# Patient Record
Sex: FEMALE | Race: WHITE | NOT HISPANIC OR LATINO | Employment: FULL TIME | URBAN - METROPOLITAN AREA
[De-identification: names, ages, dates, MRNs, and addresses within clinical notes are randomized per-mention and may not be internally consistent; named-entity substitution may affect disease eponyms.]

---

## 2017-03-07 ENCOUNTER — HOSPITAL ENCOUNTER (EMERGENCY)
Facility: MEDICAL CENTER | Age: 22
End: 2017-03-07
Payer: COMMERCIAL

## 2017-03-07 VITALS
RESPIRATION RATE: 16 BRPM | HEIGHT: 70 IN | HEART RATE: 92 BPM | WEIGHT: 164.46 LBS | SYSTOLIC BLOOD PRESSURE: 115 MMHG | BODY MASS INDEX: 23.55 KG/M2 | OXYGEN SATURATION: 100 % | DIASTOLIC BLOOD PRESSURE: 73 MMHG | TEMPERATURE: 99.2 F

## 2017-03-07 PROCEDURE — 302449 STATCHG TRIAGE ONLY (STATISTIC): Mod: EDC

## 2017-03-07 ASSESSMENT — PAIN SCALES - GENERAL: PAINLEVEL_OUTOF10: 6

## 2017-03-07 NOTE — ED NOTES
Pt comes in complaining of generalized abd pain and nausea since last night. Pt reporting recent appy and an illius approx 5 months ago. Pt stating dizziness. Pt stating she is approx 3 days late on her menstrual cycle.

## 2017-11-14 ENCOUNTER — HOSPITAL ENCOUNTER (EMERGENCY)
Facility: MEDICAL CENTER | Age: 22
End: 2017-11-14
Attending: EMERGENCY MEDICINE
Payer: COMMERCIAL

## 2017-11-14 VITALS
TEMPERATURE: 98.6 F | WEIGHT: 164.24 LBS | OXYGEN SATURATION: 98 % | BODY MASS INDEX: 23.57 KG/M2 | RESPIRATION RATE: 18 BRPM | SYSTOLIC BLOOD PRESSURE: 134 MMHG | HEART RATE: 92 BPM | DIASTOLIC BLOOD PRESSURE: 93 MMHG

## 2017-11-14 DIAGNOSIS — R11.0 NAUSEA: ICD-10-CM

## 2017-11-14 DIAGNOSIS — B34.9 VIRAL SYNDROME: ICD-10-CM

## 2017-11-14 LAB
ALBUMIN SERPL BCP-MCNC: 4.6 G/DL (ref 3.2–4.9)
ALBUMIN/GLOB SERPL: 1.5 G/DL
ALP SERPL-CCNC: 37 U/L (ref 30–99)
ALT SERPL-CCNC: 15 U/L (ref 2–50)
ANION GAP SERPL CALC-SCNC: 10 MMOL/L (ref 0–11.9)
APPEARANCE UR: CLEAR
AST SERPL-CCNC: 18 U/L (ref 12–45)
BASOPHILS # BLD AUTO: 0.5 % (ref 0–1.8)
BASOPHILS # BLD: 0.05 K/UL (ref 0–0.12)
BILIRUB SERPL-MCNC: 0.4 MG/DL (ref 0.1–1.5)
BILIRUB UR QL STRIP.AUTO: NEGATIVE
BUN SERPL-MCNC: 13 MG/DL (ref 8–22)
CALCIUM SERPL-MCNC: 9.3 MG/DL (ref 8.4–10.2)
CHLORIDE SERPL-SCNC: 105 MMOL/L (ref 96–112)
CO2 SERPL-SCNC: 22 MMOL/L (ref 20–33)
COLOR UR: YELLOW
CREAT SERPL-MCNC: 0.89 MG/DL (ref 0.5–1.4)
CULTURE IF INDICATED INDCX: NO UA CULTURE
EOSINOPHIL # BLD AUTO: 0.15 K/UL (ref 0–0.51)
EOSINOPHIL NFR BLD: 1.5 % (ref 0–6.9)
ERYTHROCYTE [DISTWIDTH] IN BLOOD BY AUTOMATED COUNT: 46 FL (ref 35.9–50)
GFR SERPL CREATININE-BSD FRML MDRD: >60 ML/MIN/1.73 M 2
GLOBULIN SER CALC-MCNC: 3.1 G/DL (ref 1.9–3.5)
GLUCOSE SERPL-MCNC: 85 MG/DL (ref 65–99)
GLUCOSE UR STRIP.AUTO-MCNC: NEGATIVE MG/DL
HCG SERPL QL: NEGATIVE
HCT VFR BLD AUTO: 42.7 % (ref 37–47)
HGB BLD-MCNC: 14.4 G/DL (ref 12–16)
IMM GRANULOCYTES # BLD AUTO: 0.03 K/UL (ref 0–0.11)
IMM GRANULOCYTES NFR BLD AUTO: 0.3 % (ref 0–0.9)
KETONES UR STRIP.AUTO-MCNC: NEGATIVE MG/DL
LEUKOCYTE ESTERASE UR QL STRIP.AUTO: NEGATIVE
LYMPHOCYTES # BLD AUTO: 2.3 K/UL (ref 1–4.8)
LYMPHOCYTES NFR BLD: 23 % (ref 22–41)
MCH RBC QN AUTO: 31.5 PG (ref 27–33)
MCHC RBC AUTO-ENTMCNC: 33.7 G/DL (ref 33.6–35)
MCV RBC AUTO: 93.4 FL (ref 81.4–97.8)
MICRO URNS: NORMAL
MONOCYTES # BLD AUTO: 0.78 K/UL (ref 0–0.85)
MONOCYTES NFR BLD AUTO: 7.8 % (ref 0–13.4)
NEUTROPHILS # BLD AUTO: 6.68 K/UL (ref 2–7.15)
NEUTROPHILS NFR BLD: 66.9 % (ref 44–72)
NITRITE UR QL STRIP.AUTO: NEGATIVE
NRBC # BLD AUTO: 0 K/UL
NRBC BLD AUTO-RTO: 0 /100 WBC
PH UR STRIP.AUTO: 6 [PH]
PLATELET # BLD AUTO: 209 K/UL (ref 164–446)
PMV BLD AUTO: 11.3 FL (ref 9–12.9)
POTASSIUM SERPL-SCNC: 3.8 MMOL/L (ref 3.6–5.5)
PROT SERPL-MCNC: 7.7 G/DL (ref 6–8.2)
PROT UR QL STRIP: NEGATIVE MG/DL
RBC # BLD AUTO: 4.57 M/UL (ref 4.2–5.4)
RBC UR QL AUTO: NEGATIVE
SODIUM SERPL-SCNC: 137 MMOL/L (ref 135–145)
SP GR UR STRIP.AUTO: 1.02
WBC # BLD AUTO: 10 K/UL (ref 4.8–10.8)

## 2017-11-14 PROCEDURE — 85025 COMPLETE CBC W/AUTO DIFF WBC: CPT

## 2017-11-14 PROCEDURE — 99283 EMERGENCY DEPT VISIT LOW MDM: CPT

## 2017-11-14 PROCEDURE — 84703 CHORIONIC GONADOTROPIN ASSAY: CPT

## 2017-11-14 PROCEDURE — 81003 URINALYSIS AUTO W/O SCOPE: CPT

## 2017-11-14 PROCEDURE — 80053 COMPREHEN METABOLIC PANEL: CPT

## 2017-11-14 RX ORDER — ONDANSETRON 4 MG/1
4 TABLET, FILM COATED ORAL EVERY 6 HOURS PRN
Qty: 10 EACH | Refills: 1 | Status: SHIPPED | OUTPATIENT
Start: 2017-11-14 | End: 2018-03-27

## 2017-11-14 NOTE — ED NOTES
"Two weeks of nausea and decreased appetite. Started after getting cold symptoms.   States has lost 25 lbs. States weighed 180 lbs - now 164 lbs in triage.  Having difficulty at work as a  - feels weak \"like I might go unconscious.     "

## 2017-11-14 NOTE — ED PROVIDER NOTES
ED Provider Note    CHIEF COMPLAINT  Chief Complaint   Patient presents with   • Dizziness   • Nausea   • Loss of Appetite       HPI  Gwendolyn Masters is a 22 y.o. female who presentsWith complaint of lightheadedness, nausea.  While at work last week, she felt lightheaded, working as a .  She states she was sent home.  With persistent malaise, she presents for evaluation.  Symptoms 1st started after developing rhinorrhea and cough he can go.  As the cough improved, she developed more nausea with poor appetite.  She believes herself to have lost 10-15 pounds over the past several months.  No true syncope.  No chest pain.  No shortness of breath.    REVIEW OF SYSTEMS  Constitutional: Gen. malaise, general weakness  Respiratory: No shortness of breath.  Cough last week has resolved  Cardiac: No chest pain or syncope  Gastrointestinal: Nausea, poor appetite  Musculoskeletal: No acute neck or back pain  Neurologic: No headache  Genitourinary: No dysuria       All other systems are negative.     PAST MEDICAL HISTORY  Past Medical History:   Diagnosis Date   • Arm fracture, right     x2       FAMILY HISTORY  History reviewed. No pertinent family history.    SOCIAL HISTORY  Social History     Social History   • Marital status: Single     Spouse name: N/A   • Number of children: N/A   • Years of education: N/A     Social History Main Topics   • Smoking status: Former Smoker     Packs/day: 0.15     Types: Cigarettes     Quit date: 5/14/2017   • Smokeless tobacco: Never Used   • Alcohol use No      Comment: rare   • Drug use:       Comment: Inhailed [employer]   • Sexual activity: Not on file     Other Topics Concern   • Not on file     Social History Narrative   • No narrative on file       SURGICAL HISTORY  Past Surgical History:   Procedure Laterality Date   • LAPAROSCOPIC LYSIS OF ADHESIONS N/A 9/4/2016    Procedure: LAPAROSCOPIC LYSIS OF ADHESIONS;  Surgeon: Venecia Hawk M.D.;  Location: SURGERY [facility];   "Service:    • APPENDECTOMY LAPAROSCOPIC  7/26/2016    Procedure: APPENDECTOMY LAPAROSCOPIC;  Surgeon: Joao Dorantes M.D.;  Location: SURGERY [facility];  Service:    • OTHER ORTHOPEDIC SURGERY      meniscus repair - RIGHT       CURRENT MEDICATIONS  Home Medications     Reviewed by Low Islas (Pharmacy Tech) on 11/14/17 at 0935  Med List Status: Complete   Medication Last Dose Status        Patient Bora Taking any Medications                       ALLERGIES  Allergies   Allergen Reactions   • Erythromycin Unspecified     \"Childhood allergie\".     • Morphine Hives       PHYSICAL EXAM  VITAL SIGNS: /93   Pulse 92   Temp 37 °C (98.6 °F)   Resp 18   Wt 74.5 kg (164 lb 3.9 oz)   LMP 10/20/2017 (Approximate)   SpO2 98%   BMI 23.57 kg/m²   Constitutional:Well-nourished, nontoxic appearance  ENT: Nares clear, mucous membranes moist.  Eyes:  Conjunctiva normal, No discharge.    Lymphatic: No adenopathy.   Cardiovascular: Normal heart rate, Normal rhythm.   Pulmonary: No wheezing, no rales  Gastrointestinal: Abdomen is soft and nontender.  No guarding.  Bowel sounds are active.  Skin: Warm, Dry, No erythema, No rash.   Musculoskeletal:  No CVA tenderness.   Neurologic: Alert & oriented x 3, Normal motor and sensory function, No focal deficits noted.   Psychiatric:Normal affect, Normal mood.    RADIOLOGY/PROCEDURES/Labs  Results for orders placed or performed during the hospital encounter of 11/14/17   URINALYSIS,CULTURE IF INDICATED   Result Value Ref Range    Color Yellow     Character Clear     Specific Gravity 1.025 <1.035    Ph 6.0 5.0 - 8.0    Glucose Negative Negative mg/dL    Ketones Negative Negative mg/dL    Protein Negative Negative mg/dL    Bilirubin Negative Negative    Nitrite Negative Negative    Leukocyte Esterase Negative Negative    Occult Blood Negative Negative    Micro Urine Req see below     Culture Indicated No UA Culture   CBC WITH DIFFERENTIAL   Result Value Ref Range    " WBC 10.0 4.8 - 10.8 K/uL    RBC 4.57 4.20 - 5.40 M/uL    Hemoglobin 14.4 12.0 - 16.0 g/dL    Hematocrit 42.7 37.0 - 47.0 %    MCV 93.4 81.4 - 97.8 fL    MCH 31.5 27.0 - 33.0 pg    MCHC 33.7 33.6 - 35.0 g/dL    RDW 46.0 35.9 - 50.0 fL    Platelet Count 209 164 - 446 K/uL    MPV 11.3 9.0 - 12.9 fL    Neutrophils-Polys 66.90 44.00 - 72.00 %    Lymphocytes 23.00 22.00 - 41.00 %    Monocytes 7.80 0.00 - 13.40 %    Eosinophils 1.50 0.00 - 6.90 %    Basophils 0.50 0.00 - 1.80 %    Immature Granulocytes 0.30 0.00 - 0.90 %    Nucleated RBC 0.00 /100 WBC    Neutrophils (Absolute) 6.68 2.00 - 7.15 K/uL    Lymphs (Absolute) 2.30 1.00 - 4.80 K/uL    Monos (Absolute) 0.78 0.00 - 0.85 K/uL    Eos (Absolute) 0.15 0.00 - 0.51 K/uL    Baso (Absolute) 0.05 0.00 - 0.12 K/uL    Immature Granulocytes (abs) 0.03 0.00 - 0.11 K/uL    NRBC (Absolute) 0.00 K/uL   COMP METABOLIC PANEL   Result Value Ref Range    Sodium 137 135 - 145 mmol/L    Potassium 3.8 3.6 - 5.5 mmol/L    Chloride 105 96 - 112 mmol/L    Co2 22 20 - 33 mmol/L    Anion Gap 10.0 0.0 - 11.9    Glucose 85 65 - 99 mg/dL    Bun 13 8 - 22 mg/dL    Creatinine 0.89 0.50 - 1.40 mg/dL    Calcium 9.3 8.4 - 10.2 mg/dL    AST(SGOT) 18 12 - 45 U/L    ALT(SGPT) 15 2 - 50 U/L    Alkaline Phosphatase 37 30 - 99 U/L    Total Bilirubin 0.4 0.1 - 1.5 mg/dL    Albumin 4.6 3.2 - 4.9 g/dL    Total Protein 7.7 6.0 - 8.2 g/dL    Globulin 3.1 1.9 - 3.5 g/dL    A-G Ratio 1.5 g/dL   HCG QUAL SERUM   Result Value Ref Range    Beta-Hcg Qualitative Serum Negative Negative   ESTIMATED GFR   Result Value Ref Range    GFR If African American >60 >60 mL/min/1.73 m 2    GFR If Non African American >60 >60 mL/min/1.73 m 2         COURSE & MEDICAL DECISION MAKING  Pertinent Labs & Imaging studies reviewed. (See chart for details)  Suspect viral syndrome, patient has nausea and poor appetite following cough and cold symptoms.  Laboratory evaluation is unremarkable, normal kidney function, normal glucose.  She is  not pregnant nor is she anemic.  Her urinalysis was negative.  Patient is prescribed Zofran for nausea, advised follow-up with  if not better in one week.  Patient has requested and was provided a note clearing her to return to work.    FINAL IMPRESSION  1. Viral syndrome    2. Nausea            Electronically signed by: Mike Dwyer, 11/14/2017 2:39 PM

## 2017-11-14 NOTE — ED NOTES
"Med rec updated and complete  Allergies reviewed  Pt states \"No prescription medications, OTC's, or vitamins\".  Pt states \"No antibiotics in the last 30 days\".    "

## 2017-11-14 NOTE — DISCHARGE INSTRUCTIONS
Nausea, Adult  Nausea is the feeling that you have an upset stomach or have to vomit. Nausea by itself is not likely a serious concern, but it may be an early sign of more serious medical problems. As nausea gets worse, it can lead to vomiting. If vomiting develops, there is the risk of dehydration.   CAUSES   · Viral infections.  · Food poisoning.  · Medicines.  · Pregnancy.  · Motion sickness.  · Migraine headaches.  · Emotional distress.  · Severe pain from any source.  · Alcohol intoxication.  HOME CARE INSTRUCTIONS  · Get plenty of rest.  · Ask your caregiver about specific rehydration instructions.  · Eat small amounts of food and sip liquids more often.  · Take all medicines as told by your caregiver.  SEEK MEDICAL CARE IF:  · You have not improved after 2 days, or you get worse.  · You have a headache.  SEEK IMMEDIATE MEDICAL CARE IF:   · You have a fever.  · You faint.  · You keep vomiting or have blood in your vomit.  · You are extremely weak or dehydrated.  · You have dark or bloody stools.  · You have severe chest or abdominal pain.  MAKE SURE YOU:  · Understand these instructions.  · Will watch your condition.  · Will get help right away if you are not doing well or get worse.     This information is not intended to replace advice given to you by your health care provider. Make sure you discuss any questions you have with your health care provider.     Document Released: 01/25/2006 Document Revised: 01/08/2016 Document Reviewed: 08/29/2012  [employer] Interactive Patient Education ©2016 [employer]

## 2017-12-26 ENCOUNTER — HOSPITAL ENCOUNTER (EMERGENCY)
Facility: MEDICAL CENTER | Age: 22
End: 2017-12-26
Attending: EMERGENCY MEDICINE
Payer: COMMERCIAL

## 2017-12-26 VITALS
OXYGEN SATURATION: 97 % | TEMPERATURE: 98.8 F | RESPIRATION RATE: 16 BRPM | SYSTOLIC BLOOD PRESSURE: 104 MMHG | BODY MASS INDEX: 23.13 KG/M2 | HEIGHT: 70 IN | WEIGHT: 161.6 LBS | HEART RATE: 91 BPM | DIASTOLIC BLOOD PRESSURE: 62 MMHG

## 2017-12-26 DIAGNOSIS — R11.0 NAUSEA: ICD-10-CM

## 2017-12-26 DIAGNOSIS — N39.0 URINARY TRACT INFECTION WITHOUT HEMATURIA, SITE UNSPECIFIED: ICD-10-CM

## 2017-12-26 LAB
ALBUMIN SERPL BCP-MCNC: 4.8 G/DL (ref 3.2–4.9)
ALBUMIN/GLOB SERPL: 1.7 G/DL
ALP SERPL-CCNC: 52 U/L (ref 30–99)
ALT SERPL-CCNC: 12 U/L (ref 2–50)
ANION GAP SERPL CALC-SCNC: 11 MMOL/L (ref 0–11.9)
APPEARANCE UR: ABNORMAL
AST SERPL-CCNC: 16 U/L (ref 12–45)
BACTERIA #/AREA URNS HPF: ABNORMAL /HPF
BACTERIA GENITAL QL WET PREP: NORMAL
BASOPHILS # BLD AUTO: 0.5 % (ref 0–1.8)
BASOPHILS # BLD: 0.06 K/UL (ref 0–0.12)
BILIRUB SERPL-MCNC: 1.1 MG/DL (ref 0.1–1.5)
BILIRUB UR QL STRIP.AUTO: NEGATIVE
BUN SERPL-MCNC: 16 MG/DL (ref 8–22)
CALCIUM SERPL-MCNC: 9.3 MG/DL (ref 8.4–10.2)
CHLORIDE SERPL-SCNC: 102 MMOL/L (ref 96–112)
CO2 SERPL-SCNC: 22 MMOL/L (ref 20–33)
COLOR UR: YELLOW
CREAT SERPL-MCNC: 0.8 MG/DL (ref 0.5–1.4)
CULTURE IF INDICATED INDCX: YES UA CULTURE
EOSINOPHIL # BLD AUTO: 0.14 K/UL (ref 0–0.51)
EOSINOPHIL NFR BLD: 1.1 % (ref 0–6.9)
EPI CELLS #/AREA URNS HPF: ABNORMAL /HPF
ERYTHROCYTE [DISTWIDTH] IN BLOOD BY AUTOMATED COUNT: 44.8 FL (ref 35.9–50)
GFR SERPL CREATININE-BSD FRML MDRD: >60 ML/MIN/1.73 M 2
GLOBULIN SER CALC-MCNC: 2.9 G/DL (ref 1.9–3.5)
GLUCOSE SERPL-MCNC: 108 MG/DL (ref 65–99)
GLUCOSE UR STRIP.AUTO-MCNC: NEGATIVE MG/DL
HCG UR QL: NEGATIVE
HCT VFR BLD AUTO: 41.9 % (ref 37–47)
HGB BLD-MCNC: 14.3 G/DL (ref 12–16)
IMM GRANULOCYTES # BLD AUTO: 0.04 K/UL (ref 0–0.11)
IMM GRANULOCYTES NFR BLD AUTO: 0.3 % (ref 0–0.9)
KETONES UR STRIP.AUTO-MCNC: 15 MG/DL
LEUKOCYTE ESTERASE UR QL STRIP.AUTO: NEGATIVE
LIPASE SERPL-CCNC: 26 U/L (ref 7–58)
LYMPHOCYTES # BLD AUTO: 1.24 K/UL (ref 1–4.8)
LYMPHOCYTES NFR BLD: 9.6 % (ref 22–41)
MCH RBC QN AUTO: 31.4 PG (ref 27–33)
MCHC RBC AUTO-ENTMCNC: 34.1 G/DL (ref 33.6–35)
MCV RBC AUTO: 91.9 FL (ref 81.4–97.8)
MICRO URNS: ABNORMAL
MONOCYTES # BLD AUTO: 0.86 K/UL (ref 0–0.85)
MONOCYTES NFR BLD AUTO: 6.7 % (ref 0–13.4)
MUCOUS THREADS #/AREA URNS HPF: ABNORMAL /HPF
NEUTROPHILS # BLD AUTO: 10.51 K/UL (ref 2–7.15)
NEUTROPHILS NFR BLD: 81.8 % (ref 44–72)
NITRITE UR QL STRIP.AUTO: POSITIVE
NRBC # BLD AUTO: 0 K/UL
NRBC BLD-RTO: 0 /100 WBC
PH UR STRIP.AUTO: 7 [PH]
PLATELET # BLD AUTO: 282 K/UL (ref 164–446)
PMV BLD AUTO: 10.9 FL (ref 9–12.9)
POTASSIUM SERPL-SCNC: 3.6 MMOL/L (ref 3.6–5.5)
PROT SERPL-MCNC: 7.7 G/DL (ref 6–8.2)
PROT UR QL STRIP: NEGATIVE MG/DL
RBC # BLD AUTO: 4.56 M/UL (ref 4.2–5.4)
RBC # URNS HPF: ABNORMAL /HPF
RBC UR QL AUTO: NEGATIVE
SIGNIFICANT IND 70042: NORMAL
SITE SITE: NORMAL
SODIUM SERPL-SCNC: 135 MMOL/L (ref 135–145)
SOURCE SOURCE: NORMAL
SP GR UR REFRACTOMETRY: 1.02
SP GR UR STRIP.AUTO: 1.02
WBC # BLD AUTO: 12.9 K/UL (ref 4.8–10.8)
WBC #/AREA URNS HPF: ABNORMAL /HPF

## 2017-12-26 PROCEDURE — 83690 ASSAY OF LIPASE: CPT

## 2017-12-26 PROCEDURE — 87077 CULTURE AEROBIC IDENTIFY: CPT

## 2017-12-26 PROCEDURE — 87591 N.GONORRHOEAE DNA AMP PROB: CPT

## 2017-12-26 PROCEDURE — 700111 HCHG RX REV CODE 636 W/ 250 OVERRIDE (IP): Performed by: EMERGENCY MEDICINE

## 2017-12-26 PROCEDURE — A9270 NON-COVERED ITEM OR SERVICE: HCPCS | Performed by: EMERGENCY MEDICINE

## 2017-12-26 PROCEDURE — 96375 TX/PRO/DX INJ NEW DRUG ADDON: CPT

## 2017-12-26 PROCEDURE — 96374 THER/PROPH/DIAG INJ IV PUSH: CPT

## 2017-12-26 PROCEDURE — 700105 HCHG RX REV CODE 258: Performed by: EMERGENCY MEDICINE

## 2017-12-26 PROCEDURE — 36415 COLL VENOUS BLD VENIPUNCTURE: CPT

## 2017-12-26 PROCEDURE — 80053 COMPREHEN METABOLIC PANEL: CPT

## 2017-12-26 PROCEDURE — 87186 SC STD MICRODIL/AGAR DIL: CPT

## 2017-12-26 PROCEDURE — 81001 URINALYSIS AUTO W/SCOPE: CPT

## 2017-12-26 PROCEDURE — 700102 HCHG RX REV CODE 250 W/ 637 OVERRIDE(OP): Performed by: EMERGENCY MEDICINE

## 2017-12-26 PROCEDURE — 85025 COMPLETE CBC W/AUTO DIFF WBC: CPT

## 2017-12-26 PROCEDURE — 87491 CHLMYD TRACH DNA AMP PROBE: CPT

## 2017-12-26 PROCEDURE — 81025 URINE PREGNANCY TEST: CPT

## 2017-12-26 PROCEDURE — 87086 URINE CULTURE/COLONY COUNT: CPT

## 2017-12-26 PROCEDURE — 99285 EMERGENCY DEPT VISIT HI MDM: CPT

## 2017-12-26 RX ORDER — ONDANSETRON 4 MG/1
4 TABLET, ORALLY DISINTEGRATING ORAL EVERY 6 HOURS PRN
Qty: 20 TAB | Refills: 0 | Status: SHIPPED | OUTPATIENT
Start: 2017-12-26 | End: 2018-03-27

## 2017-12-26 RX ORDER — KETOROLAC TROMETHAMINE 30 MG/ML
30 INJECTION, SOLUTION INTRAMUSCULAR; INTRAVENOUS ONCE
Status: COMPLETED | OUTPATIENT
Start: 2017-12-26 | End: 2017-12-26

## 2017-12-26 RX ORDER — CEFDINIR 300 MG/1
300 CAPSULE ORAL ONCE
Status: COMPLETED | OUTPATIENT
Start: 2017-12-26 | End: 2017-12-26

## 2017-12-26 RX ORDER — SODIUM CHLORIDE 9 MG/ML
1000 INJECTION, SOLUTION INTRAVENOUS ONCE
Status: COMPLETED | OUTPATIENT
Start: 2017-12-26 | End: 2017-12-26

## 2017-12-26 RX ORDER — CEFDINIR 300 MG/1
300 CAPSULE ORAL 2 TIMES DAILY
Qty: 20 CAP | Refills: 0 | Status: SHIPPED | OUTPATIENT
Start: 2017-12-26 | End: 2018-01-05

## 2017-12-26 RX ORDER — ONDANSETRON 2 MG/ML
4 INJECTION INTRAMUSCULAR; INTRAVENOUS ONCE
Status: COMPLETED | OUTPATIENT
Start: 2017-12-26 | End: 2017-12-26

## 2017-12-26 RX ORDER — ACETAMINOPHEN 325 MG/1
650 TABLET ORAL ONCE
Status: COMPLETED | OUTPATIENT
Start: 2017-12-26 | End: 2017-12-26

## 2017-12-26 RX ADMIN — ACETAMINOPHEN 650 MG: 325 TABLET, FILM COATED ORAL at 03:51

## 2017-12-26 RX ADMIN — KETOROLAC TROMETHAMINE 30 MG: 30 INJECTION, SOLUTION INTRAMUSCULAR at 04:17

## 2017-12-26 RX ADMIN — ONDANSETRON 4 MG: 2 INJECTION INTRAMUSCULAR; INTRAVENOUS at 03:45

## 2017-12-26 RX ADMIN — SODIUM CHLORIDE 1000 ML: 9 INJECTION, SOLUTION INTRAVENOUS at 03:44

## 2017-12-26 RX ADMIN — CEFDINIR 300 MG: 300 CAPSULE ORAL at 04:30

## 2017-12-26 RX ADMIN — LIDOCAINE HYDROCHLORIDE 30 ML: 20 SOLUTION OROPHARYNGEAL at 03:49

## 2017-12-26 ASSESSMENT — ENCOUNTER SYMPTOMS
CHILLS: 0
DIZZINESS: 0
HEARTBURN: 0
COUGH: 0
SHORTNESS OF BREATH: 0
FEVER: 0
ABDOMINAL PAIN: 1
VOMITING: 0
NAUSEA: 1

## 2017-12-26 NOTE — ED NOTES
Pt states feeling better took po antibiotic w/o difficulty.  Given d/c papers and instructed on f/u, pt verbalizes understanding.  Pt given work excuse at her request.

## 2017-12-26 NOTE — ED NOTES
"Pt comes in c/o severe nausea w/out vomiting  Also having severe abdomen pain   \"I've never had this severe nausea before\"  "

## 2017-12-26 NOTE — DISCHARGE INSTRUCTIONS
Urinary Tract Infection  Urinary tract infections (UTIs) can develop anywhere along your urinary tract. Your urinary tract is your body's drainage system for removing wastes and extra water. Your urinary tract includes two kidneys, two ureters, a bladder, and a urethra. Your kidneys are a pair of bean-shaped organs. Each kidney is about the size of your fist. They are located below your ribs, one on each side of your spine.  CAUSES  Infections are caused by microbes, which are microscopic organisms, including fungi, viruses, and bacteria. These organisms are so small that they can only be seen through a microscope. Bacteria are the microbes that most commonly cause UTIs.  SYMPTOMS   Symptoms of UTIs may vary by age and gender of the patient and by the location of the infection. Symptoms in young women typically include a frequent and intense urge to urinate and a painful, burning feeling in the bladder or urethra during urination. Older women and men are more likely to be tired, shaky, and weak and have muscle aches and abdominal pain. A fever may mean the infection is in your kidneys. Other symptoms of a kidney infection include pain in your back or sides below the ribs, nausea, and vomiting.  DIAGNOSIS  To diagnose a UTI, your caregiver will ask you about your symptoms. Your caregiver also will ask to provide a urine sample. The urine sample will be tested for bacteria and white blood cells. White blood cells are made by your body to help fight infection.  TREATMENT   Typically, UTIs can be treated with medication. Because most UTIs are caused by a bacterial infection, they usually can be treated with the use of antibiotics. The choice of antibiotic and length of treatment depend on your symptoms and the type of bacteria causing your infection.  HOME CARE INSTRUCTIONS  · If you were prescribed antibiotics, take them exactly as your caregiver instructs you. Finish the medication even if you feel better after you  have only taken some of the medication.  · Drink enough water and fluids to keep your urine clear or pale yellow.  · Avoid caffeine, tea, and carbonated beverages. They tend to irritate your bladder.  · Empty your bladder often. Avoid holding urine for long periods of time.  · Empty your bladder before and after sexual intercourse.  · After a bowel movement, women should cleanse from front to back. Use each tissue only once.  SEEK MEDICAL CARE IF:   · You have back pain.  · You develop a fever.  · Your symptoms do not begin to resolve within 3 days.  SEEK IMMEDIATE MEDICAL CARE IF:   · You have severe back pain or lower abdominal pain.  · You develop chills.  · You have nausea or vomiting.  · You have continued burning or discomfort with urination.  MAKE SURE YOU:   · Understand these instructions.  · Will watch your condition.  · Will get help right away if you are not doing well or get worse.     This information is not intended to replace advice given to you by your health care provider. Make sure you discuss any questions you have with your health care provider.     Document Released: 09/27/2006 Document Revised: 01/08/2016 Document Reviewed: 01/25/2013  [employer] Interactive Patient Education ©2016 [employer]

## 2017-12-26 NOTE — ED NOTES
Pt given IV and PO med's IV NS up and infusing well  Pelvic exam done and specimens sent to lab  Comfort measures given

## 2017-12-26 NOTE — ED PROVIDER NOTES
"ED Provider Note    Means of arrival: private vehicle  History obtained from: patient  History limited by: none    CHIEF COMPLAINT  Chief Complaint   Patient presents with   • Nausea   • Abdominal Pain       HPI  Gwendolyn Masters is a 22 y.o. female who presents to the Emergency Department for abdominal pain. Patient reports that she has had epigastric pain on and off for the last week. The pain is aching and moderate in severity. She notes that the pain is worsened when she has intercourse.  She reports associated nausea. She denies fevers, chills, emesis, chest pain, SOB, vaginal bleeding and discharge. LMP was 11/11/17.    REVIEW OF SYSTEMS  Review of Systems   Constitutional: Negative for chills and fever.   Respiratory: Negative for cough and shortness of breath.    Cardiovascular: Negative for chest pain.   Gastrointestinal: Positive for abdominal pain and nausea. Negative for heartburn and vomiting.   Genitourinary:        Negative for vaginal bleeding and discharge.    Neurological: Negative for dizziness.   All other systems reviewed and are negative.    PAST MEDICAL HISTORY   has a past medical history of Arm fracture, right.    SURGICAL HISTORY   has a past surgical history that includes appendectomy laparoscopic (7/26/2016); laparoscopic lysis of adhesions (N/A, 9/4/2016); and other orthopedic surgery.    SOCIAL HISTORY  Social History   Substance Use Topics   • Smoking status: Former Smoker     Packs/day: 0.15     Types: Cigarettes     Quit date: 5/14/2017   • Smokeless tobacco: Never Used   • Alcohol use No      Comment: rare      History   Drug Use     Comment: Inhailed [employer]       FAMILY HISTORY  No pertinent family history    CURRENT MEDICATIONS  Home Medications    None         ALLERGIES  Allergies   Allergen Reactions   • Erythromycin Unspecified     \"Childhood allergie\".     • Morphine Hives       PHYSICAL EXAM  VITAL SIGNS: /62   Pulse 91   Temp 37.1 °C (98.8 °F)   Resp 16   Ht 1.778 m (5' " "10\")   Wt 73.3 kg (161 lb 9.6 oz)   LMP 12/11/2017   SpO2 97%   BMI 23.19 kg/m²   Vitals reviewed by myself.  Physical Exam   Constitutional: She is well-developed, well-nourished, and in no distress. No distress.   HENT:   Head: Normocephalic.   Eyes: EOM are normal.   Neck: Normal range of motion.   Cardiovascular: Normal rate, regular rhythm and normal heart sounds.  Exam reveals no gallop and no friction rub.    No murmur heard.  Pulmonary/Chest: Effort normal and breath sounds normal. No respiratory distress. She has no wheezes. She has no rales.   Abdominal: Soft. She exhibits no distension. There is no tenderness. There is no rebound and no guarding.   Genitourinary:   Genitourinary Comments: Normal external genitalia. Cervix is pink and non-friable without discharge or bleeding from the os.         DIAGNOSTIC STUDIES /  LABS  Labs Reviewed   CBC WITH DIFFERENTIAL - Abnormal; Notable for the following:        Result Value    WBC 12.9 (*)     Neutrophils-Polys 81.80 (*)     Lymphocytes 9.60 (*)     Neutrophils (Absolute) 10.51 (*)     Monos (Absolute) 0.86 (*)     All other components within normal limits   COMP METABOLIC PANEL - Abnormal; Notable for the following:     Glucose 108 (*)     All other components within normal limits   URINALYSIS,CULTURE IF INDICATED - Abnormal; Notable for the following:     Character Sl Cloudy (*)     Ketones 15 (*)     Nitrite Positive (*)     All other components within normal limits   URINE MICROSCOPIC (W/UA) - Abnormal; Notable for the following:     Bacteria Few (*)     All other components within normal limits   LIPASE   HCG QUALITATIVE UR   WET PREP   CHLAMYDIA/GC PCR URINE OR SWAB   REFRACTOMETER SG   ESTIMATED GFR   URINE CULTURE(NEW)      All labs reviewed by me      COURSE & MEDICAL DECISION MAKING  Nursing notes, VS, PMSFHx reviewed in chart.    Patient is a 22 year old female who comes in for epigastric pain worsened by intercourse. Differential diagnosis " includes BV, UTI, gastritis, gastroenteritis, pancreatitis and electrolyte abnormality. Diagnostic work-up includes labs, pelvic labs and UA.    Patient's initial vitals are within normal limits. She is treated with tylenol, zofran and GI cocktail after which she feels improved. Labs returns an are unremarkable. Pregnancy test is negative. UA is positive for UTI. Patient is treated with cefdinir and is able to tolerate oral intake in the ED. Therefore she is reassured, provided with prescriptions for cefdinir and zofran. She is then given strict return precautions and discharged home in stable condition.      FINAL IMPRESSION  1. Urinary tract infection without hematuria, site unspecified    2. Nausea

## 2017-12-26 NOTE — LETTER
12/29/2017               Gwendolyn Masters  [address] [facility] [address]        Dear Gwendolyn (MR#[id number])    This letter is sent in regards to your, recent visit to the [facility] Emergency Department on 12/26/2017.  During the visit, tests were performed to assist the physician in a medical diagnosis.  A review of those tests requires that we notify you of the following:    Your urine culture was POSITIVE for a bacteria called Escherichia coli. The antibiotic prescribed for you (cefdinir) should be active to treat this bacteria. IT IS IMPORTANT THAT YOU CONTINUE TAKING YOUR ANTIBIOTIC UNTIL IT IS FINISHED.      Please feel free to contact me at the number below if you have any questions or concerns. Thank you for your cooperation in the matter.    Sincerely,  ED Culture Follow-Up Staff  Daniela Ovalle, PharmD    [facility], Emergency Department  [address] [place] [id number]  [phone] (ED Culture Line)  [phone]

## 2017-12-28 LAB
BACTERIA UR CULT: ABNORMAL
BACTERIA UR CULT: ABNORMAL
C TRACH DNA SPEC QL NAA+PROBE: NEGATIVE
N GONORRHOEA DNA SPEC QL NAA+PROBE: NEGATIVE
SIGNIFICANT IND 70042: ABNORMAL
SITE SITE: ABNORMAL
SOURCE SOURCE: ABNORMAL
SPECIMEN SOURCE: NORMAL

## 2017-12-30 NOTE — ED NOTES
ED Positive Culture Follow-up/Notification Note:    Date: 12/29/17     Patient seen in the ED on 12/26/2017 for abdominal pain worsened with intercourse.   1. Urinary tract infection without hematuria, site unspecified    2. Nausea       Discharge Medication List as of 12/26/2017  4:38 AM      START taking these medications    Details   cefdinir (OMNICEF) 300 MG Cap Take 1 Cap by mouth 2 times a day for 10 days., Disp-20 Cap, R-0, Print Rx Paper      ondansetron (ZOFRAN ODT) 4 MG TABLET DISPERSIBLE Take 1 Tab by mouth every 6 hours as needed for Nausea., Disp-20 Tab, R-0, Print Rx Paper             Allergies: Erythromycin and Morphine     Final cultures:   Results     Procedure Component Value Units Date/Time    CHLAMYDIA & GC BY PCR [[id number]] Collected:  12/26/17 0355    Order Status:  Completed Specimen:  Genital from Genital Updated:  12/28/17 2019     Source Vaginal     C. trachomatis by PCR Negative     N. gonorrhoeae by PCR Negative    URINE CULTURE(NEW) [[id number]]  (Abnormal)  (Susceptibility) Collected:  12/26/17 0335    Order Status:  Completed Specimen:  Urine Updated:  12/28/17 0859     Significant Indicator POS (POS)     Source UR     Site --     Urine Culture -- (A)     Urine Culture -- (A)     Escherichia coli  >100,000 cfu/mL      Narrative:       TEST Urine Culture WAS CANCELLED, 12/26/17 06:48 HIS# [id number]    Culture & Susceptibility     ESCHERICHIA COLI     Antibiotic Sensitivity Microscan Unit Status    Ampicillin Sensitive <=8 mcg/mL Final    Cefepime Sensitive <=8 mcg/mL Final    Cefotaxime Sensitive <=2 mcg/mL Final    Cefotetan Sensitive <=16 mcg/mL Final    Ceftazidime Sensitive <=1 mcg/mL Final    Ceftriaxone Sensitive <=8 mcg/mL Final    Cefuroxime Sensitive <=4 mcg/mL Final    Cephalothin Sensitive <=8 mcg/mL Final    Ciprofloxacin Sensitive <=1 mcg/mL Final    Gentamicin Sensitive <=4 mcg/mL Final    Levofloxacin Sensitive <=2 mcg/mL Final    Nitrofurantoin Sensitive <=32 mcg/mL  Final    Pip/Tazobactam Sensitive <=16 mcg/mL Final    Piperacillin Sensitive <=16 mcg/mL Final    Tigecycline Sensitive <=2 mcg/mL Final    Tobramycin Sensitive <=4 mcg/mL Final    Trimeth/Sulfa Sensitive <=2/38 mcg/mL Final                       WET PREP [[id number]] Collected:  12/26/17 0355    Order Status:  Completed Specimen:  Genital from Vaginal Updated:  12/26/17 0407     Significant Indicator NEG     Source GEN     Site VAGINAL     Wet Prep For Parasites --     No yeast.  No motile Trichomonas seen.  No clue cells seen.  Few WBC's seen.      URINALYSIS,CULTURE IF INDICATED [[id number]]  (Abnormal) Collected:  12/26/17 0335    Order Status:  Completed Specimen:  Urine from Urine, Clean Catch Updated:  12/26/17 0357     Color Yellow     Character Sl Cloudy (A)     Comment: Corrected result; previously reported as Clear on 12/26/17 at 03:50        Specific Gravity 1.020     Ph 7.0     Glucose Negative mg/dL      Ketones 15 (A) mg/dL      Protein Negative mg/dL      Bilirubin Negative     Nitrite Positive (A)     Leukocyte Esterase Negative     Occult Blood Negative     Micro Urine Req Microscopic     Culture Indicated Yes UA Culture     URINE CULTURE(NEW) [[id number]]     Order Status:  Canceled           Plan:   Appropriate antibiotic therapy prescribed. No changes required based upon culture result.  Sent letter to patient to notify of positive culture result and encourage compliance with prescribed antibiotics.     Daniela Ovalle

## 2018-03-27 ENCOUNTER — APPOINTMENT (OUTPATIENT)
Dept: RADIOLOGY | Facility: MEDICAL CENTER | Age: 23
End: 2018-03-27
Attending: EMERGENCY MEDICINE
Payer: COMMERCIAL

## 2018-03-27 ENCOUNTER — HOSPITAL ENCOUNTER (EMERGENCY)
Facility: MEDICAL CENTER | Age: 23
End: 2018-03-27
Attending: EMERGENCY MEDICINE
Payer: COMMERCIAL

## 2018-03-27 VITALS
DIASTOLIC BLOOD PRESSURE: 74 MMHG | SYSTOLIC BLOOD PRESSURE: 112 MMHG | TEMPERATURE: 98 F | BODY MASS INDEX: 22.81 KG/M2 | RESPIRATION RATE: 17 BRPM | HEART RATE: 79 BPM | WEIGHT: 158.95 LBS | OXYGEN SATURATION: 100 %

## 2018-03-27 DIAGNOSIS — N28.1 RENAL CYST: ICD-10-CM

## 2018-03-27 DIAGNOSIS — R10.84 GENERALIZED ABDOMINAL PAIN: ICD-10-CM

## 2018-03-27 LAB
ALBUMIN SERPL BCP-MCNC: 4.4 G/DL (ref 3.2–4.9)
ALBUMIN/GLOB SERPL: 1.5 G/DL
ALP SERPL-CCNC: 37 U/L (ref 30–99)
ALT SERPL-CCNC: 13 U/L (ref 2–50)
ANION GAP SERPL CALC-SCNC: 5 MMOL/L (ref 0–11.9)
APPEARANCE UR: CLEAR
AST SERPL-CCNC: 16 U/L (ref 12–45)
BASOPHILS # BLD AUTO: 1 % (ref 0–1.8)
BASOPHILS # BLD: 0.06 K/UL (ref 0–0.12)
BILIRUB SERPL-MCNC: 0.4 MG/DL (ref 0.1–1.5)
BUN SERPL-MCNC: 12 MG/DL (ref 8–22)
CALCIUM SERPL-MCNC: 9.2 MG/DL (ref 8.4–10.2)
CHLORIDE SERPL-SCNC: 106 MMOL/L (ref 96–112)
CO2 SERPL-SCNC: 24 MMOL/L (ref 20–33)
COLOR UR: YELLOW
CREAT SERPL-MCNC: 0.92 MG/DL (ref 0.5–1.4)
EOSINOPHIL # BLD AUTO: 0.14 K/UL (ref 0–0.51)
EOSINOPHIL NFR BLD: 2.3 % (ref 0–6.9)
ERYTHROCYTE [DISTWIDTH] IN BLOOD BY AUTOMATED COUNT: 48.1 FL (ref 35.9–50)
GLOBULIN SER CALC-MCNC: 2.9 G/DL (ref 1.9–3.5)
GLUCOSE SERPL-MCNC: 77 MG/DL (ref 65–99)
GLUCOSE UR STRIP.AUTO-MCNC: NEGATIVE MG/DL
HCG SERPL QL: NEGATIVE
HCG UR QL: NEGATIVE
HCT VFR BLD AUTO: 39.9 % (ref 37–47)
HGB BLD-MCNC: 13.4 G/DL (ref 12–16)
IMM GRANULOCYTES # BLD AUTO: 0.02 K/UL (ref 0–0.11)
IMM GRANULOCYTES NFR BLD AUTO: 0.3 % (ref 0–0.9)
KETONES UR STRIP.AUTO-MCNC: NEGATIVE MG/DL
LEUKOCYTE ESTERASE UR QL STRIP.AUTO: NEGATIVE
LIPASE SERPL-CCNC: 28 U/L (ref 7–58)
LYMPHOCYTES # BLD AUTO: 2.15 K/UL (ref 1–4.8)
LYMPHOCYTES NFR BLD: 35.2 % (ref 22–41)
MCH RBC QN AUTO: 31.5 PG (ref 27–33)
MCHC RBC AUTO-ENTMCNC: 33.6 G/DL (ref 33.6–35)
MCV RBC AUTO: 93.7 FL (ref 81.4–97.8)
MONOCYTES # BLD AUTO: 0.57 K/UL (ref 0–0.85)
MONOCYTES NFR BLD AUTO: 9.3 % (ref 0–13.4)
NEUTROPHILS # BLD AUTO: 3.16 K/UL (ref 2–7.15)
NEUTROPHILS NFR BLD: 51.9 % (ref 44–72)
NITRITE UR QL STRIP.AUTO: NEGATIVE
NRBC # BLD AUTO: 0 K/UL
NRBC BLD-RTO: 0 /100 WBC
PH UR STRIP.AUTO: 5.5 [PH]
PLATELET # BLD AUTO: 197 K/UL (ref 164–446)
PMV BLD AUTO: 11.4 FL (ref 9–12.9)
POTASSIUM SERPL-SCNC: 3.5 MMOL/L (ref 3.6–5.5)
PROT SERPL-MCNC: 7.3 G/DL (ref 6–8.2)
PROT UR QL STRIP: NEGATIVE MG/DL
RBC # BLD AUTO: 4.26 M/UL (ref 4.2–5.4)
RBC UR QL AUTO: ABNORMAL
SODIUM SERPL-SCNC: 135 MMOL/L (ref 135–145)
SP GR UR STRIP.AUTO: 1.02
WBC # BLD AUTO: 6.1 K/UL (ref 4.8–10.8)

## 2018-03-27 PROCEDURE — 83690 ASSAY OF LIPASE: CPT

## 2018-03-27 PROCEDURE — 36415 COLL VENOUS BLD VENIPUNCTURE: CPT

## 2018-03-27 PROCEDURE — 81025 URINE PREGNANCY TEST: CPT

## 2018-03-27 PROCEDURE — 84703 CHORIONIC GONADOTROPIN ASSAY: CPT

## 2018-03-27 PROCEDURE — 700117 HCHG RX CONTRAST REV CODE 255: Performed by: EMERGENCY MEDICINE

## 2018-03-27 PROCEDURE — 81002 URINALYSIS NONAUTO W/O SCOPE: CPT

## 2018-03-27 PROCEDURE — 85025 COMPLETE CBC W/AUTO DIFF WBC: CPT

## 2018-03-27 PROCEDURE — 99284 EMERGENCY DEPT VISIT MOD MDM: CPT

## 2018-03-27 PROCEDURE — 80053 COMPREHEN METABOLIC PANEL: CPT

## 2018-03-27 PROCEDURE — 74177 CT ABD & PELVIS W/CONTRAST: CPT

## 2018-03-27 RX ADMIN — IOHEXOL 100 ML: 350 INJECTION, SOLUTION INTRAVENOUS at 16:36

## 2018-03-27 ASSESSMENT — PAIN SCALES - WONG BAKER: WONGBAKER_NUMERICALRESPONSE: HURTS JUST A LITTLE BIT

## 2018-03-27 ASSESSMENT — PAIN SCALES - GENERAL: PAINLEVEL_OUTOF10: 2

## 2018-03-27 NOTE — ED NOTES
Med Rec completed per patient  Allergies reviewed  No ORAL antibiotics in last 30 days    Patient stated she had a laxative a week ago

## 2018-03-27 NOTE — ED PROVIDER NOTES
"ED Provider Note    CHIEF COMPLAINT  Chief Complaint   Patient presents with   • Low Back Pain   • Painful Urination       HPI  Gwendolyn Masters is a 22 y.o. female who presents evaluation of abdominal pain.  The patient presents with several days of diffuse periumbilical abdominal pain.  The patient had some nausea associated with this.  The patient indicates she is currently menstruating.  She is nulligravida.  She denies: Fever, chills, URI symptoms, cardiorespiratory symptoms, vomiting, hematemesis, melena hematochezia, hematuria, dysuria.  She does have some mild lower back pain associated with this.  No other acute symptomatology or complaints.    REVIEW OF SYSTEMS  See HPI for further details.  She does relate a history of appendectomy as well as an ileus/potential bowel obstruction.  No history of: Hypertension, diabetes, thyroid dysfunction, seizures, cardiopulmonary disorders, gastrointestinal disorders.  All other systems negative.    PAST MEDICAL HISTORY  Past Medical History:   Diagnosis Date   • Arm fracture, right     x2       FAMILY HISTORY  History reviewed. No pertinent family history.    SOCIAL HISTORY  Positive tobacco use; positive marijuana use; occasional alcohol use;    SURGICAL HISTORY  Past Surgical History:   Procedure Laterality Date   • LAPAROSCOPIC LYSIS OF ADHESIONS N/A 9/4/2016    Procedure: LAPAROSCOPIC LYSIS OF ADHESIONS;  Surgeon: Venecia Hawk M.D.;  Location: SURGERY [facility];  Service:    • APPENDECTOMY LAPAROSCOPIC  7/26/2016    Procedure: APPENDECTOMY LAPAROSCOPIC;  Surgeon: Joao Dorantes M.D.;  Location: SURGERY [facility];  Service:    • OTHER ORTHOPEDIC SURGERY      meniscus repair - RIGHT       CURRENT MEDICATIONS  See nurse's notes    ALLERGIES  Allergies   Allergen Reactions   • Erythromycin Unspecified     \"Childhood allergie\".     • Morphine Hives       PHYSICAL EXAM  VITAL SIGNS: /72   Pulse 99   Temp 36.7 °C (98 °F)   Resp 18   Wt 72.1 kg (158 " lb 15.2 oz)   LMP 03/24/2018 (Approximate)   SpO2 100%   BMI 22.81 kg/m²    Constitutional: A 22-year-old female, Well developed, Well nourished, No acute distress, Non-toxic appearance.   HENT: Normocephalic, Atraumatic, Nares:Clear, Oropharynx: moist, well hydrated, posterior pharynx:clear   Eyes: PERRL, EOMI, Conjunctiva normal, No discharge.   Neck: Normal range of motion, No tenderness, Supple, No stridor.   Lymphatic: No lymphadenopathy noted.   Cardiovascular: Regular rate and rhythm without mumurs, gallups, rubs   Thorax & Lungs: Normal Equal breath sounds, No respiratory distress, No wheezing, no stridor, no rales. No chest tenderness.   Abdomen: Diffuse periumbilical tenderness, negative Philippe sign, nondistended, no organomegaly, positive bowel sounds normal in quality. No guarding or rebound.  Skin: Good skin turgor, pink, warm, dry. No rashes, petechiae, purpura. Normal capillary refill.   Back: No tenderness, No CVA tenderness.   Extremities: Intact distal pulses, No edema, No tenderness, No cyanosis,  Vascular: Pulses are 2+, symmetric in the upper and lower extremities.  Musculoskeletal: Good range of motion in all major joints. No tenderness to palpation or major deformities noted.   Neurologic: Alert & oriented x 3,  No gross focal deficits noted.   Psychiatric: Affect normal, Judgment normal, Mood normal.       RADIOLOGY/PROCEDURES  CT-ABDOMEN-PELVIS WITH   Final Result      Negative contrast-enhanced CT of the abdomen and pelvis following appendectomy      Left renal anterior cortex cyst.            COURSE & MEDICAL DECISION MAKING  Pertinent Labs & Imaging studies reviewed. (See chart for details)  1.  Saline lock    Laboratory studies: CBC and differential within normal; beta hCG is negative; CMP within normal; lipase 20; urinalysis positive for blood but negative for nitrite and leukocyte esterase;    Discussion: At this time, patient presents for evaluation of abdominal pain.  Patient has  normal white count differential and chemistry testing.  CT scanning does not show any acute abnormalities.  She has had a previous appendectomy.  No evidence of a bowel obstruction or intra-abdominal infections identified.  There was a small left renal anterior cortical cyst which appears benign.  I discussed all the findings with the patient.  She's been given follow-up.  The patient indicates she is comfortable with this exploration and disposition.    FINAL IMPRESSION  1. Generalized abdominal pain    2. Renal cyst           PLAN  1.  Appropriate discharge instructions give  2.  Follow-up with primary care    Electronically signed by: Guy G Gansert, 3/27/2018 2:51 PM

## 2018-03-27 NOTE — ED NOTES
Bilateral low back pain and dysuria x 1 week. States concerned about inability to gain weight and bowel movements.

## 2018-03-28 NOTE — DISCHARGE INSTRUCTIONS
Abdominal Pain, Women  Abdominal (stomach, pelvic, or belly) pain can be caused by many things. It is important to tell your doctor:  · The location of the pain.  · Does it come and go or is it present all the time?  · Are there things that start the pain (eating certain foods, exercise)?  · Are there other symptoms associated with the pain (fever, nausea, vomiting, diarrhea)?  All of this is helpful to know when trying to find the cause of the pain.  CAUSES   · Stomach: virus or bacteria infection, or ulcer.  · Intestine: appendicitis (inflamed appendix), regional ileitis (Crohn's disease), ulcerative colitis (inflamed colon), irritable bowel syndrome, diverticulitis (inflamed diverticulum of the colon), or cancer of the stomach or intestine.  · Gallbladder disease or stones in the gallbladder.  · Kidney disease, kidney stones, or infection.  · Pancreas infection or cancer.  · Fibromyalgia (pain disorder).  · Diseases of the female organs:  · Uterus: fibroid (non-cancerous) tumors or infection.  · Fallopian tubes: infection or tubal pregnancy.  · Ovary: cysts or tumors.  · Pelvic adhesions (scar tissue).  · Endometriosis (uterus lining tissue growing in the pelvis and on the pelvic organs).  · Pelvic congestion syndrome (female organs filling up with blood just before the menstrual period).  · Pain with the menstrual period.  · Pain with ovulation (producing an egg).  · Pain with an IUD (intrauterine device, birth control) in the uterus.  · Cancer of the female organs.  · Functional pain (pain not caused by a disease, may improve without treatment).  · Psychological pain.  · Depression.  DIAGNOSIS   Your doctor will decide the seriousness of your pain by doing an examination.  · Blood tests.  · X-rays.  · Ultrasound.  · CT scan (computed tomography, special type of X-ray).  · MRI (magnetic resonance imaging).  · Cultures, for infection.  · Barium enema (dye inserted in the large intestine, to better view it with  X-rays).  · Colonoscopy (looking in intestine with a lighted tube).  · Laparoscopy (minor surgery, looking in abdomen with a lighted tube).  · Major abdominal exploratory surgery (looking in abdomen with a large incision).  TREATMENT   The treatment will depend on the cause of the pain.   · Many cases can be observed and treated at home.  · Over-the-counter medicines recommended by your caregiver.  · Prescription medicine.  · Antibiotics, for infection.  · Birth control pills, for painful periods or for ovulation pain.  · Hormone treatment, for endometriosis.  · Nerve blocking injections.  · Physical therapy.  · Antidepressants.  · Counseling with a psychologist or psychiatrist.  · Minor or major surgery.  HOME CARE INSTRUCTIONS   · Do not take laxatives, unless directed by your caregiver.  · Take over-the-counter pain medicine only if ordered by your caregiver. Do not take aspirin because it can cause an upset stomach or bleeding.  · Try a clear liquid diet (broth or water) as ordered by your caregiver. Slowly move to a bland diet, as tolerated, if the pain is related to the stomach or intestine.  · Have a thermometer and take your temperature several times a day, and record it.  · Bed rest and sleep, if it helps the pain.  · Avoid sexual intercourse, if it causes pain.  · Avoid stressful situations.  · Keep your follow-up appointments and tests, as your caregiver orders.  · If the pain does not go away with medicine or surgery, you may try:  · Acupuncture.  · Relaxation exercises (yoga, meditation).  · Group therapy.  · Counseling.  SEEK MEDICAL CARE IF:   · You notice certain foods cause stomach pain.  · Your home care treatment is not helping your pain.  · You need stronger pain medicine.  · You want your IUD removed.  · You feel faint or lightheaded.  · You develop nausea and vomiting.  · You develop a rash.  · You are having side effects or an allergy to your medicine.  SEEK IMMEDIATE MEDICAL CARE IF:   · Your  pain does not go away or gets worse.  · You have a fever.  · Your pain is felt only in portions of the abdomen. The right side could possibly be appendicitis. The left lower portion of the abdomen could be colitis or diverticulitis.  · You are passing blood in your stools (bright red or black tarry stools, with or without vomiting).  · You have blood in your urine.  · You develop chills, with or without a fever.  · You pass out.  MAKE SURE YOU:   · Understand these instructions.  · Will watch your condition.  · Will get help right away if you are not doing well or get worse.  Document Released: 10/14/2008 Document Revised: 03/11/2013 Document Reviewed: 11/04/2010  [employer]® Patient Information ©2014 [employer], [employer].

## 2018-05-22 ENCOUNTER — HOSPITAL ENCOUNTER (EMERGENCY)
Facility: MEDICAL CENTER | Age: 23
End: 2018-05-22
Attending: EMERGENCY MEDICINE
Payer: COMMERCIAL

## 2018-05-22 VITALS
TEMPERATURE: 98.2 F | WEIGHT: 154.1 LBS | DIASTOLIC BLOOD PRESSURE: 71 MMHG | HEIGHT: 70 IN | RESPIRATION RATE: 16 BRPM | SYSTOLIC BLOOD PRESSURE: 114 MMHG | HEART RATE: 65 BPM | OXYGEN SATURATION: 100 % | BODY MASS INDEX: 22.06 KG/M2

## 2018-05-22 PROCEDURE — 302449 STATCHG TRIAGE ONLY (STATISTIC)

## 2018-05-23 NOTE — ED NOTES
Pt expressed wish to leave, despite not receiving treatment due to ride being unavailable if she stays later. Risks of leaving prior to examination and treatment explained to pt, including death. Pt verbalized understanding. Information on [facility] and urgent care given at pt's request. Return conditions explained. Pt verbalized intent to be seen in morning if condition doesn't worsen. Pt signed AMA form. VS rechecked and stable. Pt ambulated out of ED

## 2018-08-15 ENCOUNTER — HOSPITAL ENCOUNTER (EMERGENCY)
Facility: MEDICAL CENTER | Age: 23
End: 2018-08-15
Attending: EMERGENCY MEDICINE
Payer: COMMERCIAL

## 2018-08-15 VITALS
WEIGHT: 160.05 LBS | SYSTOLIC BLOOD PRESSURE: 127 MMHG | OXYGEN SATURATION: 96 % | TEMPERATURE: 97.9 F | RESPIRATION RATE: 16 BRPM | DIASTOLIC BLOOD PRESSURE: 55 MMHG | HEIGHT: 70 IN | BODY MASS INDEX: 22.91 KG/M2 | HEART RATE: 55 BPM

## 2018-08-15 DIAGNOSIS — R10.9 LEFT FLANK PAIN: ICD-10-CM

## 2018-08-15 LAB
APPEARANCE UR: CLEAR
BILIRUB UR QL STRIP.AUTO: NEGATIVE
COLOR UR: YELLOW
GLUCOSE UR STRIP.AUTO-MCNC: NEGATIVE MG/DL
HCG UR QL: NEGATIVE
KETONES UR STRIP.AUTO-MCNC: NEGATIVE MG/DL
LEUKOCYTE ESTERASE UR QL STRIP.AUTO: NEGATIVE
MICRO URNS: NORMAL
NITRITE UR QL STRIP.AUTO: NEGATIVE
PH UR STRIP.AUTO: 6.5 [PH]
PROT UR QL STRIP: NEGATIVE MG/DL
RBC UR QL AUTO: NEGATIVE
SP GR UR STRIP.AUTO: 1.01
UROBILINOGEN UR STRIP.AUTO-MCNC: 0.2 MG/DL

## 2018-08-15 PROCEDURE — 99284 EMERGENCY DEPT VISIT MOD MDM: CPT

## 2018-08-15 PROCEDURE — 36415 COLL VENOUS BLD VENIPUNCTURE: CPT

## 2018-08-15 PROCEDURE — 81025 URINE PREGNANCY TEST: CPT

## 2018-08-15 PROCEDURE — 81003 URINALYSIS AUTO W/O SCOPE: CPT

## 2018-08-15 PROCEDURE — 700102 HCHG RX REV CODE 250 W/ 637 OVERRIDE(OP): Performed by: EMERGENCY MEDICINE

## 2018-08-15 PROCEDURE — A9270 NON-COVERED ITEM OR SERVICE: HCPCS | Performed by: EMERGENCY MEDICINE

## 2018-08-15 RX ORDER — CYCLOBENZAPRINE HCL 10 MG
10 TABLET ORAL ONCE
Status: COMPLETED | OUTPATIENT
Start: 2018-08-15 | End: 2018-08-15

## 2018-08-15 RX ORDER — NAPROXEN 500 MG/1
500 TABLET ORAL ONCE
Status: COMPLETED | OUTPATIENT
Start: 2018-08-15 | End: 2018-08-15

## 2018-08-15 RX ORDER — NAPROXEN 500 MG/1
500 TABLET ORAL 2 TIMES DAILY WITH MEALS
Qty: 14 TAB | Refills: 0 | Status: SHIPPED | OUTPATIENT
Start: 2018-08-15 | End: 2018-08-22

## 2018-08-15 RX ORDER — CYCLOBENZAPRINE HCL 10 MG
10 TABLET ORAL 3 TIMES DAILY PRN
Qty: 9 TAB | Refills: 0 | Status: SHIPPED | OUTPATIENT
Start: 2018-08-15 | End: 2018-08-18

## 2018-08-15 RX ADMIN — NAPROXEN 500 MG: 500 TABLET ORAL at 03:52

## 2018-08-15 RX ADMIN — CYCLOBENZAPRINE 10 MG: 10 TABLET, FILM COATED ORAL at 03:52

## 2018-08-15 ASSESSMENT — PAIN SCALES - GENERAL: PAINLEVEL_OUTOF10: 5

## 2018-08-15 NOTE — DISCHARGE INSTRUCTIONS
Flank Pain  Flank pain refers to pain that is located on the side of the body between the upper abdomen and the back. The pain may occur over a short period of time (acute) or may be long-term or reoccurring (chronic). It may be mild or severe. Flank pain can be caused by many things.  CAUSES   Some of the more common causes of flank pain include:  · Muscle strains.    · Muscle spasms.    · A disease of your spine (vertebral disk disease).    · A lung infection (pneumonia).    · Fluid around your lungs (pulmonary edema).    · A kidney infection.    · Kidney stones.    · A very painful skin rash caused by the chickenpox virus (shingles).    · Gallbladder disease.    HOME CARE INSTRUCTIONS   Home care will depend on the cause of your pain. In general,  · Rest as directed by your caregiver.  · Drink enough fluids to keep your urine clear or pale yellow.  · Only take over-the-counter or prescription medicines as directed by your caregiver. Some medicines may help relieve the pain.  · Tell your caregiver about any changes in your pain.  · Follow up with your caregiver as directed.  SEEK IMMEDIATE MEDICAL CARE IF:   · Your pain is not controlled with medicine.    · You have new or worsening symptoms.  · Your pain increases.    · You have abdominal pain.    · You have shortness of breath.    · You have persistent nausea or vomiting.    · You have swelling in your abdomen.    · You feel faint or pass out.    · You have blood in your urine.  · You have a fever or persistent symptoms for more than 2-3 days.  · You have a fever and your symptoms suddenly get worse.  MAKE SURE YOU:   · Understand these instructions.  · Will watch your condition.  · Will get help right away if you are not doing well or get worse.  This information is not intended to replace advice given to you by your health care provider. Make sure you discuss any questions you have with your health care provider.  Document Released: 02/08/2007 Document  Revised: 09/11/2013 Document Reviewed: 09/20/2016  [employer] Interactive Patient Education © 2017 [employer]

## 2018-08-15 NOTE — ED NOTES
Chief Complaint   Patient presents with   • Low Back Pain     Lower left pain x3 days, hx of cyst on kidney     Agree with triage rn

## 2018-08-15 NOTE — ED PROVIDER NOTES
"CHIEF COMPLAINT  Chief Complaint   Patient presents with   • Low Back Pain     Lower left pain x3 days, hx of cyst on kidney       HPI  Gwendolyn Masters is a 22 y.o. female who presents 3 days of left flank pain.  States that it comes and goes and is keeping her from sleeping secondary to the discomfort.  No dysuria or hematuria.  Denies fevers or vomiting.  Recently stopped her period a few days ago.  Has a regular menstrual cycle.  Denies any pelvic pain, vaginal bleeding, discharge.  Has been straining recently with bowel movements though her last bowel movement was probably yesterday.  No bloody stool or black stool or diarrhea.  Denies any trauma.  She states that she works as a  and denies prior back injuries.  No midline back pain.  No numbness or weakness to the legs.  No skin changes or rash.    REVIEW OF SYSTEMS  See HPI for further details. All other systems are negative.     PAST MEDICAL HISTORY   has a past medical history of Arm fracture, right.    SOCIAL HISTORY  Social History     Social History Main Topics   • Smoking status: Former Smoker     Packs/day: 0.15     Types: Cigarettes     Quit date: 5/14/2017   • Smokeless tobacco: Never Used   • Alcohol use No      Comment: rare   • Drug use: Yes      Comment: Inhaled THC   • Sexual activity: Not on file       SURGICAL HISTORY   has a past surgical history that includes appendectomy laparoscopic (7/26/2016); laparoscopic lysis of adhesions (N/A, 9/4/2016); and other orthopedic surgery.    CURRENT MEDICATIONS  Home Medications     Reviewed by Vanessa Vivas R.N. (Registered Nurse) on 08/15/18 at 0259  Med List Status: <None>   Medication Last Dose Status        Patient Bora Taking any Medications                       ALLERGIES  Allergies   Allergen Reactions   • Erythromycin Unspecified     \"Childhood allergie\".     • Morphine Hives       PHYSICAL EXAM  VITAL SIGNS: /55   Pulse 99   Temp 36.6 °C (97.9 °F)   Resp 20   Ht 1.778 m (5' " "10\")   Wt 72.6 kg (160 lb 0.9 oz)   LMP 08/13/2018   SpO2 99%   BMI 22.97 kg/m²   Pulse ox interpretation: I interpret this pulse ox as normal.  Constitutional: Alert in no apparent distress.  HENT: No signs of trauma, Bilateral external ears normal, Nose normal.   Neck: Normal range of motion, No tenderness, Supple, No stridor.   Cardiovascular: Regular rate and rhythm, no murmurs.   Thorax & Lungs: Normal breath sounds, No respiratory distress, No wheezing, No chest tenderness.   Abdomen: Bowel sounds normal, Soft, No tenderness, No masses, No pulsatile masses. No peritoneal signs.  Skin: Warm, Dry, No erythema, No rash.   Back: No bony tenderness, No CVA tenderness.   Extremities: Intact distal pulses, No edema, No tenderness, No cyanosis  Musculoskeletal: Good range of motion in all major joints. No tenderness to palpation or major deformities noted.   Neurologic: Alert, No focal deficits noted.       DIAGNOSTIC STUDIES / PROCEDURES      LABS  Labs Reviewed   URINALYSIS   HCG QUALITATIVE UR   REFRACTOMETER SG       RADIOLOGY  No orders to display         COURSE & MEDICAL DECISION MAKING  Pertinent Labs & Imaging studies reviewed. (See chart for details)  22 y.o. female presenting with left flank pain.  No vomiting.  No urinary symptoms.  No abdominal pain.  No  symptoms such as vaginal bleeding or discharge.  No rashes or signs of zoster.  Denies any trauma.  No midline spine tenderness or pain.  No numbness or weakness in her lower extremities.  Urinalysis did not show evidence of hematuria or urinary tract infection.  No evidence of nephrolithiasis or pyelonephritis.  Patient is able to move her back in all directions.  No distress.  No signs of sepsis.  No abdominal complaints.  No signs of obstructive process.    Suspect that the patient most likely has musculoskeletal discomfort.  She was treated here with Flexeril and naproxen.  Had improvement in her discomfort.  We will discharge home with similar " "medications.  To follow-up with her primary care physician for further management.  I do not believe further advanced imaging with CT scan is indicated in this setting.  I did review the patient's last CT in March 2018.  Showed a very small left renal cyst that is likely not causing the patient's discomfort.  Should the patient have worsening symptoms or development of any other concerning signs or symptoms however, she should return here for further evaluation.  Advanced imaging may be indicated in that setting.    The patient will not drink alcohol nor drive with prescribed medications.   The patient will return for worsening symptoms or failure of improvement and is stable at the time of discharge. The patient verbalizes understanding in their own words.    /55   Pulse (!) 55   Temp 36.6 °C (97.9 °F)   Resp 18   Ht 1.778 m (5' 10\")   Wt 72.6 kg (160 lb 0.9 oz)   LMP 08/13/2018   SpO2 89%   BMI 22.97 kg/m²     The patient was referred to primary care where they will receive further BP management.      [facility], Emergency Dept  [address] [place] [id number] [phone]    As needed, If symptoms worsen    Primary care doctor    In 2 days        FINAL IMPRESSION  1. Left flank pain            Electronically signed by: Mike Boggs, 8/15/2018 3:07 AM    "

## 2018-08-15 NOTE — ED NOTES
KINDER FALL RISK       RISK  Present to ED b/c of fall (syncope, seizure, or ALOC) n  Age  >70 n  Altered Mental Status (Intoxicated with alcohol or substance confusion, inability to follow instructions, disorientation) n  Impaired Mobility (Ambulates or transfers with assistive devices or assist. Ambulates with unsteady gait and no assistance.  Unable to ambulate to transfer.) n  Nursing Judgement (Bowel or bladder incontinence, diarrhea, urinary frequency or urgency, leg weakness, orthostatic hypotension, dizziness or vertigo, narcotic use.) n    YES TO ANY RISK = HIGH FALL RISK     Interventions in place marked in RED   1. Move patient closer to nurses stations  2. Familiarize the patient with environment  3. Place call light within reach and demonstrate call light use  4. Keep patients personal possessions within patient safe reach (if appropriate)   5. Place stretcher in low position and brakes locked  6.Place yellow socks and armband on patient   7. Place green triangle on patients door  8. Give patient urinal if applicable  9. Keep floor surfaces clean and dry  10.Keep patient care areas uncluttered  11. Use a lap belt or posey vest   12. Assess patient hourly for :Pain, persona needs, position change, and call light access.

## 2018-08-15 NOTE — ED TRIAGE NOTES
"Chief Complaint   Patient presents with   • Low Back Pain     Lower left pain x3 days, hx of cyst on kidney     Blood pressure 127/55, pulse 99, temperature 36.6 °C (97.9 °F), resp. rate 20, height 1.778 m (5' 10\"), weight 72.6 kg (160 lb 0.9 oz), SpO2 99 %.    Pt ambulated to triage with a steady gait c/o back pain, nausea. Pt denies specific episode that could have caused back strain. Reports diagnosis of renal cyst x6 months ago, states this pain feels similar.   "

## 2018-08-30 ENCOUNTER — HOSPITAL ENCOUNTER (EMERGENCY)
Facility: MEDICAL CENTER | Age: 23
End: 2018-08-30
Attending: EMERGENCY MEDICINE
Payer: COMMERCIAL

## 2018-08-30 VITALS
SYSTOLIC BLOOD PRESSURE: 109 MMHG | HEIGHT: 70 IN | RESPIRATION RATE: 13 BRPM | DIASTOLIC BLOOD PRESSURE: 73 MMHG | OXYGEN SATURATION: 91 % | TEMPERATURE: 97.9 F | BODY MASS INDEX: 22.6 KG/M2 | WEIGHT: 157.85 LBS | HEART RATE: 67 BPM

## 2018-08-30 DIAGNOSIS — G44.309 POST-TRAUMATIC HEADACHE, NOT INTRACTABLE, UNSPECIFIED CHRONICITY PATTERN: ICD-10-CM

## 2018-08-30 DIAGNOSIS — S01.01XA LACERATION OF SCALP, INITIAL ENCOUNTER: ICD-10-CM

## 2018-08-30 PROCEDURE — 700101 HCHG RX REV CODE 250: Performed by: EMERGENCY MEDICINE

## 2018-08-30 PROCEDURE — 304217 HCHG IRRIGATION SYSTEM

## 2018-08-30 PROCEDURE — 304999 HCHG REPAIR-SIMPLE/INTERMED LEVEL 1

## 2018-08-30 PROCEDURE — 90471 IMMUNIZATION ADMIN: CPT

## 2018-08-30 PROCEDURE — 700111 HCHG RX REV CODE 636 W/ 250 OVERRIDE (IP): Performed by: EMERGENCY MEDICINE

## 2018-08-30 PROCEDURE — A9270 NON-COVERED ITEM OR SERVICE: HCPCS | Performed by: EMERGENCY MEDICINE

## 2018-08-30 PROCEDURE — 90715 TDAP VACCINE 7 YRS/> IM: CPT | Performed by: EMERGENCY MEDICINE

## 2018-08-30 PROCEDURE — 99283 EMERGENCY DEPT VISIT LOW MDM: CPT

## 2018-08-30 PROCEDURE — 700102 HCHG RX REV CODE 250 W/ 637 OVERRIDE(OP): Performed by: EMERGENCY MEDICINE

## 2018-08-30 PROCEDURE — 303747 HCHG EXTRA SUTURE

## 2018-08-30 RX ORDER — IBUPROFEN 600 MG/1
600 TABLET ORAL EVERY 6 HOURS PRN
Qty: 30 TAB | Refills: 0 | Status: SHIPPED | OUTPATIENT
Start: 2018-08-30 | End: 2018-09-04

## 2018-08-30 RX ORDER — IBUPROFEN 600 MG/1
600 TABLET ORAL ONCE
Status: COMPLETED | OUTPATIENT
Start: 2018-08-30 | End: 2018-08-30

## 2018-08-30 RX ORDER — ACETAMINOPHEN 325 MG/1
650 TABLET ORAL ONCE
Status: COMPLETED | OUTPATIENT
Start: 2018-08-30 | End: 2018-08-30

## 2018-08-30 RX ORDER — ACETAMINOPHEN 325 MG/1
325 TABLET ORAL EVERY 4 HOURS PRN
Qty: 30 TAB | Refills: 0 | Status: SHIPPED | OUTPATIENT
Start: 2018-08-30 | End: 2018-09-04

## 2018-08-30 RX ORDER — LIDOCAINE HYDROCHLORIDE AND EPINEPHRINE 10; 10 MG/ML; UG/ML
20 INJECTION, SOLUTION INFILTRATION; PERINEURAL ONCE
Status: COMPLETED | OUTPATIENT
Start: 2018-08-30 | End: 2018-08-30

## 2018-08-30 RX ORDER — BACITRACIN ZINC AND POLYMYXIN B SULFATE 500; 1000 [USP'U]/G; [USP'U]/G
OINTMENT TOPICAL ONCE
Status: COMPLETED | OUTPATIENT
Start: 2018-08-30 | End: 2018-08-30

## 2018-08-30 RX ADMIN — ACETAMINOPHEN 650 MG: 325 TABLET, FILM COATED ORAL at 18:04

## 2018-08-30 RX ADMIN — Medication 1 EACH: at 19:23

## 2018-08-30 RX ADMIN — CLOSTRIDIUM TETANI TOXOID ANTIGEN (FORMALDEHYDE INACTIVATED), CORYNEBACTERIUM DIPHTHERIAE TOXOID ANTIGEN (FORMALDEHYDE INACTIVATED), BORDETELLA PERTUSSIS TOXOID ANTIGEN (GLUTARALDEHYDE INACTIVATED), BORDETELLA PERTUSSIS FILAMENTOUS HEMAGGLUTININ ANTIGEN (FORMALDEHYDE INACTIVATED), BORDETELLA PERTUSSIS PERTACTIN ANTIGEN, AND BORDETELLA PERTUSSIS FIMBRIAE 2/3 ANTIGEN 0.5 ML: 5; 2; 2.5; 5; 3; 5 INJECTION, SUSPENSION INTRAMUSCULAR at 18:05

## 2018-08-30 RX ADMIN — LIDOCAINE HYDROCHLORIDE,EPINEPHRINE BITARTRATE 20 ML: 10; .01 INJECTION, SOLUTION INFILTRATION; PERINEURAL at 19:05

## 2018-08-30 RX ADMIN — IBUPROFEN 600 MG: 600 TABLET, FILM COATED ORAL at 18:05

## 2018-08-30 ASSESSMENT — PAIN SCALES - GENERAL
PAINLEVEL_OUTOF10: 5
PAINLEVEL_OUTOF10: 4
PAINLEVEL_OUTOF10: 7

## 2018-08-30 NOTE — ED TRIAGE NOTES
"Chief Complaint   Patient presents with   • T-5000 GLF     reports she tripped and hit her head on a concrete heater. Deep scalp lac noted to RT upper forehead/hairline. bleeding controlled. -LOC.    • Head Laceration     Pt ambulatory to triage with above. VSS. Pt calm. Unknown last tdap.    [facility] Educated on [facility]age process and to inform staff of any changes.     /56   Pulse 95   Temp 36.6 °C (97.9 °F)   Resp 18   Ht 1.778 m (5' 10\")   Wt 71.6 kg (157 lb 13.6 oz)   LMP 08/13/2018   SpO2 99%   BMI 22.65 kg/m²     "

## 2018-08-31 NOTE — ED PROVIDER NOTES
ED Provider Note    Scribed for Jc Álvarez M.D. by Caryn Aldridge. 8/30/2018  5:10 PM    CHIEF COMPLAINT  Chief Complaint   Patient presents with   • T-5000 GLF     reports she tripped and hit her head on a concrete heater. Deep scalp lac noted to RT upper forehead/hairline. bleeding controlled. -LOC.    • Head Laceration       HPI  Gwendolyn Masters is a 22 y.o. female who presents for evaluation of injuries secondary to a ground level fall this afternoon. Patient reports that she was on top of her bed, assembling her bedding, when the bedding slipped and she accidentally fell striking her head on the corner of a metal heater. She sustained an associated laceration to her right upper scalp and reports associated headache. Denies LOC, no subsequent dizziness, N/V.  Family friends and members note that she has been walking without assistance and otherwise is at her baseline.  Her tetanus is not up to date. She denies a history of blood clotting disorders    REVIEW OF SYSTEMS  Skin: laceration to her right upper scalp.   GI: No nausea, vomiting  MSK: mild neck pain  Neuro: No loss of consciousness.   Psych: No behavior changes.   Heme: No history of bleeding disorders.   See HPI for further details.     PAST MEDICAL HISTORY   has a past medical history of Arm fracture, right.    SOCIAL HISTORY  Social History     Social History Main Topics   • Smoking status: Former Smoker     Packs/day: 0.15     Types: Cigarettes     Quit date: 5/14/2017   • Smokeless tobacco: Never Used   • Alcohol use No      Comment: rare   • Drug use: Yes      Comment: Inhaled THC   • Sexual activity: None noted        SURGICAL HISTORY   has a past surgical history that includes appendectomy laparoscopic (7/26/2016); laparoscopic lysis of adhesions (N/A, 9/4/2016); and other orthopedic surgery.    CURRENT MEDICATIONS  Home Medications      "Reviewed by Cecilia Pond PhT (Pharmacy Tech) on 08/30/18 at 1643  Med List Status: Complete   Medication Last Dose Status        Patient Bora Taking any Medications                     ALLERGIES  Allergies   Allergen Reactions   • Erythromycin Unspecified     \"Childhood allergie\".     • Morphine Hives     PHYSICAL EXAM  VITAL SIGNS: /56   Pulse 95   Temp 36.6 °C (97.9 °F)   Resp 18   Ht 1.778 m (5' 10\")   Wt 71.6 kg (157 lb 13.6 oz)   LMP 08/13/2018   SpO2 99%   BMI 22.65 kg/m²  Pulse ox interpretation: I interpret this pulse ox as normal.  General: Alert, Oriented x3. No acute distress. Non-toxic appearing.   Head: Normocephalic. 3 cm full thickness laceration to the right forehead. Galea intact with no active extravasation.  Laceration is 1 cm back from the hairline.   Eyes: Pupils: R: 3 mm, L:3 mm. EOMI. Sclerae/Conjunctivae normal in appearance. No Raccoon Eyes.   Nose: No septal hematomas.   Ears: No hemotympanum, no Gibson Sign.   Mouth: No midface instability. No malocclusion.   Neck: No midline tenderness, step-off, or hematoma.   Back: No TTP. No step-off, or hematoma.   Chest: No retractions. Chest wall is non-tender   Lungs: Clear and equal to auscultation bilaterally. No wheezes, rales, or rhonchi. No respiratory distress.   Cardiovascular: Regular Rate and Rhythm. Normal S1 and S2.   Abdomen: Soft, non-distended, non-tender. No rebound or guarding.   Pelvis: Stable   Musculoskeletal: Full active and passive ROM of bilateral shoulders, elbows, wrists, hips, knees, and ankles without pain or tenderness.   Neuro: A&O x4. Motor: 5/5 to flexion/extension of all 4 extremities. Sensory intact in all 4 extremities.   Skin: See findings in head above.     DIAGNOSTIC STUDIES / PROCEDURES    Procedure - Laceration closure    Patient was positioned appropriately, 4cc 1% lidocaine with epinephrine was used as a local anesthetic. NaCl was used for irrigation. Patient was sterile draped with wound " exposed. 2 layered closure with 3x absorbable 3-0 and 6x 4-0 Vicryl uninterrupted sutures were placed with good approximation. Procedure tolerated without complications. Wound dressed with bacitracin and sterile gauze.     COURSE & MEDICAL DECISION MAKING  Pertinent Labs & Imaging studies reviewed. (See chart for details)    5:10 PM - Patient seen and examined at bedside. Patient will be treated with Tylenol 650 mg, Motrin 600 mgm Adacel 0.5 ml. I discussed with patient that due to the patient not exhibiting symptoms such as nausea, vomiting, loss of consciousness, or other symptoms, I do not believe any imaging of the head is necessary at this time. She meets very low risk criteria for clinically important traumatic brain injury and does not require imaging. Informed patient I will perform a laceration repair for treatment. She consents to this.     6:51 PM- Performed laceration repair procedure as shown above without complications. Discussed proper wound care and follow up with the patient. Additionally I discussed return to ED precautions. She will be discharged home with a prescription for Tylenol and Motrin. Patient is comfortable with discharge.     Medical Decision Making:   Patient seen and evaluated per my HPI above.  She was alert and oriented, had isolated laceration of the right upper portion of her frontal scalp.  Bleeding was well-controlled, expiration of the weight after anesthesia was unremarkable for signs of rupture of the galea bleeding is well controlled with lidocaine with epinephrine.  2 layer closure was performed per suture noted above.  Patient has no focal neurological deficits, no signs of developing headache since the incident and appears well.  After discussion of the pros and cons of getting a head CT did not feel that she would benefit of this at this time patient is in agreement and following closure the wound she is given strict return precautions should she have any changes to her  mental status she was advised to follow-up for suture removal 5-7 days.      The patient will return for worsening symptoms and is stable at the time of discharge. The patient verbalizes understanding and will comply.     DISPOSITION:  Patient will be discharged home in stable condition.    FOLLOW UP:  Your PCP    In 1 week  For suture removal    [facility], Emergency Dept  [address] [id number] [phone]  In 1 week  For suture removal    OUTPATIENT MEDICATIONS:  New Prescriptions    ACETAMINOPHEN (TYLENOL) 325 MG TAB    Take 1 Tab by mouth every four hours as needed for Moderate Pain for up to 5 days.    IBUPROFEN (MOTRIN) 600 MG TAB    Take 1 Tab by mouth every 6 hours as needed for up to 5 days.     FINAL IMPRESSION  Visit Diagnoses     ICD-10-CM   1. Laceration of scalp, initial encounter S01.01XA   2. Post-traumatic headache, not intractable, unspecified chronicity pattern G44.309      Caryn WHITAKER (Scribe), am scribing for, and in the presence of, Jc Álvarez M.D..    Electronically signed by: Caryn Aldridge (Scribe), 8/30/2018    Jc WHITAKER M.D. personally performed the services described in this documentation, as scribed by Caryn Aldridge in my presence, and it is both accurate and complete.    The note accurately reflects work and decisions made by me.  Jc Álvarez  8/30/2018  10:54 PM

## 2018-08-31 NOTE — ED NOTES
Anit bacterial ointment applied to wound. Pt verbalized understanding of discharge and follow up instructions. Pt given Rx.  VSS.  All questions answered, ambulates with steady gait to discharge.

## 2018-09-10 ENCOUNTER — HOSPITAL ENCOUNTER (EMERGENCY)
Facility: MEDICAL CENTER | Age: 23
End: 2018-09-10
Payer: COMMERCIAL

## 2018-09-10 VITALS
SYSTOLIC BLOOD PRESSURE: 100 MMHG | WEIGHT: 157.85 LBS | OXYGEN SATURATION: 98 % | TEMPERATURE: 97.9 F | BODY MASS INDEX: 22.6 KG/M2 | DIASTOLIC BLOOD PRESSURE: 66 MMHG | HEIGHT: 70 IN | RESPIRATION RATE: 16 BRPM | HEART RATE: 98 BPM

## 2018-09-10 PROCEDURE — 99281 EMR DPT VST MAYX REQ PHY/QHP: CPT

## 2018-09-10 ASSESSMENT — PAIN SCALES - WONG BAKER: WONGBAKER_NUMERICALRESPONSE: DOESN'T HURT AT ALL

## 2018-09-10 ASSESSMENT — PAIN SCALES - GENERAL: PAINLEVEL_OUTOF10: 0

## 2018-09-11 NOTE — ED TRIAGE NOTES
Pt states she had sutures placed on left front side of head, was suppose to have them removed on 9/7/18 but has been busy. Pt wound is approximated, no redness or drainage noted. Pt denies fever/chills.

## 2018-10-16 ENCOUNTER — HOSPITAL ENCOUNTER (EMERGENCY)
Facility: MEDICAL CENTER | Age: 23
End: 2018-10-16
Attending: EMERGENCY MEDICINE
Payer: COMMERCIAL

## 2018-10-16 ENCOUNTER — APPOINTMENT (OUTPATIENT)
Dept: RADIOLOGY | Facility: MEDICAL CENTER | Age: 23
End: 2018-10-16
Attending: EMERGENCY MEDICINE
Payer: COMMERCIAL

## 2018-10-16 VITALS
HEART RATE: 59 BPM | WEIGHT: 172 LBS | BODY MASS INDEX: 25.48 KG/M2 | OXYGEN SATURATION: 98 % | DIASTOLIC BLOOD PRESSURE: 75 MMHG | HEIGHT: 69 IN | SYSTOLIC BLOOD PRESSURE: 109 MMHG | RESPIRATION RATE: 16 BRPM | TEMPERATURE: 97.3 F

## 2018-10-16 DIAGNOSIS — R10.9 ABDOMINAL PAIN DURING PREGNANCY IN FIRST TRIMESTER: ICD-10-CM

## 2018-10-16 DIAGNOSIS — O26.891 ABDOMINAL PAIN DURING PREGNANCY IN FIRST TRIMESTER: ICD-10-CM

## 2018-10-16 DIAGNOSIS — O20.0 THREATENED MISCARRIAGE: ICD-10-CM

## 2018-10-16 DIAGNOSIS — N30.00 ACUTE CYSTITIS WITHOUT HEMATURIA: ICD-10-CM

## 2018-10-16 LAB
ALBUMIN SERPL BCP-MCNC: 4.9 G/DL (ref 3.2–4.9)
ALBUMIN/GLOB SERPL: 1.6 G/DL
ALP SERPL-CCNC: 42 U/L (ref 30–99)
ALT SERPL-CCNC: 15 U/L (ref 2–50)
ANION GAP SERPL CALC-SCNC: 9 MMOL/L (ref 0–11.9)
APPEARANCE UR: CLEAR
AST SERPL-CCNC: 15 U/L (ref 12–45)
B-HCG SERPL-ACNC: ABNORMAL MIU/ML (ref 0–5)
BACTERIA #/AREA URNS HPF: ABNORMAL /HPF
BASOPHILS # BLD AUTO: 0.7 % (ref 0–1.8)
BASOPHILS # BLD: 0.07 K/UL (ref 0–0.12)
BILIRUB SERPL-MCNC: 0.4 MG/DL (ref 0.1–1.5)
BILIRUB UR QL STRIP.AUTO: NEGATIVE
BUN SERPL-MCNC: 16 MG/DL (ref 8–22)
CALCIUM SERPL-MCNC: 9.9 MG/DL (ref 8.5–10.5)
CHLORIDE SERPL-SCNC: 104 MMOL/L (ref 96–112)
CO2 SERPL-SCNC: 23 MMOL/L (ref 20–33)
COLOR UR: YELLOW
CREAT SERPL-MCNC: 0.77 MG/DL (ref 0.5–1.4)
EOSINOPHIL # BLD AUTO: 0.18 K/UL (ref 0–0.51)
EOSINOPHIL NFR BLD: 1.7 % (ref 0–6.9)
EPI CELLS #/AREA URNS HPF: NEGATIVE /HPF
ERYTHROCYTE [DISTWIDTH] IN BLOOD BY AUTOMATED COUNT: 48.1 FL (ref 35.9–50)
GLOBULIN SER CALC-MCNC: 3.1 G/DL (ref 1.9–3.5)
GLUCOSE SERPL-MCNC: 94 MG/DL (ref 65–99)
GLUCOSE UR STRIP.AUTO-MCNC: NEGATIVE MG/DL
HCT VFR BLD AUTO: 42.3 % (ref 37–47)
HGB BLD-MCNC: 14.3 G/DL (ref 12–16)
HYALINE CASTS #/AREA URNS LPF: ABNORMAL /LPF
IMM GRANULOCYTES # BLD AUTO: 0.04 K/UL (ref 0–0.11)
IMM GRANULOCYTES NFR BLD AUTO: 0.4 % (ref 0–0.9)
KETONES UR STRIP.AUTO-MCNC: ABNORMAL MG/DL
LEUKOCYTE ESTERASE UR QL STRIP.AUTO: NEGATIVE
LIPASE SERPL-CCNC: 39 U/L (ref 11–82)
LYMPHOCYTES # BLD AUTO: 3.27 K/UL (ref 1–4.8)
LYMPHOCYTES NFR BLD: 31.2 % (ref 22–41)
MCH RBC QN AUTO: 31.6 PG (ref 27–33)
MCHC RBC AUTO-ENTMCNC: 33.8 G/DL (ref 33.6–35)
MCV RBC AUTO: 93.6 FL (ref 81.4–97.8)
MICRO URNS: ABNORMAL
MONOCYTES # BLD AUTO: 0.95 K/UL (ref 0–0.85)
MONOCYTES NFR BLD AUTO: 9.1 % (ref 0–13.4)
NEUTROPHILS # BLD AUTO: 5.98 K/UL (ref 2–7.15)
NEUTROPHILS NFR BLD: 56.9 % (ref 44–72)
NITRITE UR QL STRIP.AUTO: POSITIVE
NRBC # BLD AUTO: 0 K/UL
NRBC BLD-RTO: 0 /100 WBC
PH UR STRIP.AUTO: 5 [PH]
PLATELET # BLD AUTO: 229 K/UL (ref 164–446)
PMV BLD AUTO: 11 FL (ref 9–12.9)
POTASSIUM SERPL-SCNC: 3.5 MMOL/L (ref 3.6–5.5)
PROT SERPL-MCNC: 8 G/DL (ref 6–8.2)
PROT UR QL STRIP: NEGATIVE MG/DL
RBC # BLD AUTO: 4.52 M/UL (ref 4.2–5.4)
RBC # URNS HPF: ABNORMAL /HPF
RBC UR QL AUTO: NEGATIVE
SODIUM SERPL-SCNC: 136 MMOL/L (ref 135–145)
SP GR UR STRIP.AUTO: 1.02
UROBILINOGEN UR STRIP.AUTO-MCNC: 0.2 MG/DL
WBC # BLD AUTO: 10.5 K/UL (ref 4.8–10.8)
WBC #/AREA URNS HPF: ABNORMAL /HPF

## 2018-10-16 PROCEDURE — 84702 CHORIONIC GONADOTROPIN TEST: CPT

## 2018-10-16 PROCEDURE — 99285 EMERGENCY DEPT VISIT HI MDM: CPT

## 2018-10-16 PROCEDURE — 700102 HCHG RX REV CODE 250 W/ 637 OVERRIDE(OP): Performed by: EMERGENCY MEDICINE

## 2018-10-16 PROCEDURE — 96375 TX/PRO/DX INJ NEW DRUG ADDON: CPT

## 2018-10-16 PROCEDURE — 96376 TX/PRO/DX INJ SAME DRUG ADON: CPT

## 2018-10-16 PROCEDURE — 81001 URINALYSIS AUTO W/SCOPE: CPT

## 2018-10-16 PROCEDURE — 700111 HCHG RX REV CODE 636 W/ 250 OVERRIDE (IP): Performed by: EMERGENCY MEDICINE

## 2018-10-16 PROCEDURE — 96374 THER/PROPH/DIAG INJ IV PUSH: CPT

## 2018-10-16 PROCEDURE — A9270 NON-COVERED ITEM OR SERVICE: HCPCS | Performed by: EMERGENCY MEDICINE

## 2018-10-16 PROCEDURE — 36415 COLL VENOUS BLD VENIPUNCTURE: CPT

## 2018-10-16 PROCEDURE — 76817 TRANSVAGINAL US OBSTETRIC: CPT

## 2018-10-16 PROCEDURE — 85025 COMPLETE CBC W/AUTO DIFF WBC: CPT

## 2018-10-16 PROCEDURE — 80053 COMPREHEN METABOLIC PANEL: CPT

## 2018-10-16 PROCEDURE — 700105 HCHG RX REV CODE 258: Performed by: EMERGENCY MEDICINE

## 2018-10-16 PROCEDURE — 83690 ASSAY OF LIPASE: CPT

## 2018-10-16 RX ORDER — CEFDINIR 300 MG/1
300 CAPSULE ORAL 2 TIMES DAILY
Qty: 14 CAP | Refills: 0 | Status: SHIPPED | OUTPATIENT
Start: 2018-10-16 | End: 2018-10-23

## 2018-10-16 RX ORDER — ONDANSETRON 2 MG/ML
4 INJECTION INTRAMUSCULAR; INTRAVENOUS ONCE
Status: COMPLETED | OUTPATIENT
Start: 2018-10-16 | End: 2018-10-16

## 2018-10-16 RX ORDER — SODIUM CHLORIDE 9 MG/ML
1000 INJECTION, SOLUTION INTRAVENOUS ONCE
Status: COMPLETED | OUTPATIENT
Start: 2018-10-16 | End: 2018-10-16

## 2018-10-16 RX ORDER — CEFDINIR 300 MG/1
300 CAPSULE ORAL ONCE
Status: COMPLETED | OUTPATIENT
Start: 2018-10-16 | End: 2018-10-16

## 2018-10-16 RX ADMIN — ONDANSETRON 4 MG: 2 INJECTION INTRAMUSCULAR; INTRAVENOUS at 05:30

## 2018-10-16 RX ADMIN — SODIUM CHLORIDE 1000 ML: 9 INJECTION, SOLUTION INTRAVENOUS at 05:30

## 2018-10-16 RX ADMIN — ONDANSETRON 4 MG: 2 INJECTION INTRAMUSCULAR; INTRAVENOUS at 07:36

## 2018-10-16 RX ADMIN — CEFDINIR 300 MG: 300 CAPSULE ORAL at 08:36

## 2018-10-16 RX ADMIN — FENTANYL CITRATE 50 MCG: 50 INJECTION, SOLUTION INTRAMUSCULAR; INTRAVENOUS at 05:30

## 2018-10-16 ASSESSMENT — ENCOUNTER SYMPTOMS
VOMITING: 0
FEVER: 0
ABDOMINAL PAIN: 1
BACK PAIN: 1
NAUSEA: 1
NERVOUS/ANXIOUS: 1
BLOOD IN STOOL: 0
FLANK PAIN: 0
SHORTNESS OF BREATH: 0
HEADACHES: 0

## 2018-10-16 ASSESSMENT — PAIN DESCRIPTION - DESCRIPTORS: DESCRIPTORS: SHARP

## 2018-10-16 ASSESSMENT — PAIN SCALES - GENERAL
PAINLEVEL_OUTOF10: 0
PAINLEVEL_OUTOF10: 4

## 2018-10-16 NOTE — ED TRIAGE NOTES
"Chief Complaint   Patient presents with   • Abdominal Pain     9/10 stabbing pain started about 0400 today   • Nausea/Vomiting/Diarrhea       Pt brought in by EMS for abdominal pain and nausea. Pt is \"about 8 weeks pregnant\". Pt was 9/10 pain on EMS arrival and 4/10 on ED arrival.     EMS Vitals:  BP: 135/80  HR: 56  Spo2: 99 on RA  GCS: 15     EMS Interventions: None    On arrival to ED pt is A&O x 4.        "

## 2018-10-16 NOTE — LETTER
Emergency Services     October 16, 2018    Patient: Gwendolyn Masters   YOB: 1995   Date of Visit: 10/16/2018       To Whom It May Concern:    Gwendolyn Masters was seen and treated in our emergency department on  10/16/2018. Please Excuse Gwendolyn from work 10/16/18.     Sincerely,     PABLO MENCHACA D.O.  [facility], EMERGENCY DEPT  Dept: [phone]

## 2018-10-16 NOTE — ED PROVIDER NOTES
ED Provider Note    ED Provider Note    Primary care provider: Pcp Pt States None  Means of arrival: EMS  History obtained from: Patient  History limited by: None    CHIEF COMPLAINT  Chief Complaint   Patient presents with   • Abdominal Pain     9/10 stabbing pain started about 0400 today   • Nausea/Vomiting/Diarrhea       HPI  Gwendolyn Masters is a 23 y.o. female who presents to the Emergency Department with a friend with a chief complaint of lower abdominal pain and nausea and vomiting.  She states she had a similar episode 2 nights ago but it was transient and resolved on its own.  She describes the pain as sharp and located in her lower abdomen and low back.  She states she got up prior to arrival, to go to the bathroom and her pain intensified, prompting her call to 911 and transfer here for evaluation.  Her last menstrual period was .  She is .  She reports history of pretty regular periods in the past.  She states she is taken 4 pregnancy tests at home.  No fever although she reports a strong history of anxiety and when she has panic attacks, she states that she gets herself all she did up.  She reports that she feels constipated but then goes to sit on the toilet and only passes liquid.  She reports nausea for the last week but no vomiting and she denies any urinary symptoms.  She denies any vaginal bleeding, discharge, odor or itching.  She does state that when the symptoms started initially, she thought it was probably her period.  No chest pain or shortness of breath.  She has no other past medical history other than anxiety and she takes no medications.    REVIEW OF SYSTEMS  Review of Systems   Constitutional: Negative for fever.   Respiratory: Negative for shortness of breath.    Cardiovascular: Negative for chest pain.   Gastrointestinal: Positive for abdominal pain and nausea. Negative for blood in stool and vomiting.   Genitourinary: Negative for dysuria and flank pain.  "  Musculoskeletal: Positive for back pain.   Neurological: Negative for headaches.   Psychiatric/Behavioral: The patient is nervous/anxious.    All other systems reviewed and are negative.      PAST MEDICAL HISTORY   has a past medical history of Arm fracture, right.    SURGICAL HISTORY   has a past surgical history that includes appendectomy laparoscopic (7/26/2016); laparoscopic lysis of adhesions (N/A, 9/4/2016); and other orthopedic surgery.    SOCIAL HISTORY  Social History   Substance Use Topics   • Smoking status: Former Smoker     Packs/day: 0.15     Types: Cigarettes     Quit date: 5/14/2017   • Smokeless tobacco: Never Used   • Alcohol use No      Comment: rare      History   Drug Use     Comment: Inhaled THC       FAMILY HISTORY  No family history on file.    CURRENT MEDICATIONS  Home Medications    **Home medications have not yet been reviewed for this encounter**         ALLERGIES  Allergies   Allergen Reactions   • Erythromycin Unspecified     \"Childhood allergie\".     • Morphine Hives       PHYSICAL EXAM  VITAL SIGNS: /75   Pulse (!) 59   Temp 36.3 °C (97.3 °F)   Resp 16   Ht 1.753 m (5' 9\")   Wt 78 kg (172 lb)   LMP 09/13/2018 (Approximate)   SpO2 98%   BMI 25.40 kg/m²   Vitals reviewed.  Constitutional: Patient is oriented to person, place, and time. Appears well-developed and well-nourished. Mild distress.  appears anxious  Head: Normocephalic and atraumatic.   Ears: Normal external ears bilaterally.   Mouth/Throat: Oropharynx is clear with dry mucous membranes.    Eyes: Conjunctivae are normal. Pupils are equal, round, and reactive to light.   Neck: Normal range of motion. Neck supple.  Cardiovascular: Normal rate, regular rhythm and normal heart sounds.  Pulmonary/Chest: Effort normal and breath sounds normal. No respiratory distress, no wheezes, rhonchi, or rales.   Abdominal: Soft. Bowel sounds are normal. There is bilateral lower tenderness. No rebound or guarding, or peritoneal " signs. No CVA tenderness.  Musculoskeletal: No edema and no tenderness, excpet to the paraspinal muscles of the lumbar spine.   Neurological: No focal deficits.   Skin: Skin is warm and dry. No erythema. No pallor.   Psychiatric: Patient has a normal mood and affect.     LABS  Results for orders placed or performed during the hospital encounter of 10/16/18   CBC WITH DIFFERENTIAL   Result Value Ref Range    WBC 10.5 4.8 - 10.8 K/uL    RBC 4.52 4.20 - 5.40 M/uL    Hemoglobin 14.3 12.0 - 16.0 g/dL    Hematocrit 42.3 37.0 - 47.0 %    MCV 93.6 81.4 - 97.8 fL    MCH 31.6 27.0 - 33.0 pg    MCHC 33.8 33.6 - 35.0 g/dL    RDW 48.1 35.9 - 50.0 fL    Platelet Count 229 164 - 446 K/uL    MPV 11.0 9.0 - 12.9 fL    Neutrophils-Polys 56.90 44.00 - 72.00 %    Lymphocytes 31.20 22.00 - 41.00 %    Monocytes 9.10 0.00 - 13.40 %    Eosinophils 1.70 0.00 - 6.90 %    Basophils 0.70 0.00 - 1.80 %    Immature Granulocytes 0.40 0.00 - 0.90 %    Nucleated RBC 0.00 /100 WBC    Neutrophils (Absolute) 5.98 2.00 - 7.15 K/uL    Lymphs (Absolute) 3.27 1.00 - 4.80 K/uL    Monos (Absolute) 0.95 (H) 0.00 - 0.85 K/uL    Eos (Absolute) 0.18 0.00 - 0.51 K/uL    Baso (Absolute) 0.07 0.00 - 0.12 K/uL    Immature Granulocytes (abs) 0.04 0.00 - 0.11 K/uL    NRBC (Absolute) 0.00 K/uL   COMP METABOLIC PANEL   Result Value Ref Range    Sodium 136 135 - 145 mmol/L    Potassium 3.5 (L) 3.6 - 5.5 mmol/L    Chloride 104 96 - 112 mmol/L    Co2 23 20 - 33 mmol/L    Anion Gap 9.0 0.0 - 11.9    Glucose 94 65 - 99 mg/dL    Bun 16 8 - 22 mg/dL    Creatinine 0.77 0.50 - 1.40 mg/dL    Calcium 9.9 8.5 - 10.5 mg/dL    AST(SGOT) 15 12 - 45 U/L    ALT(SGPT) 15 2 - 50 U/L    Alkaline Phosphatase 42 30 - 99 U/L    Total Bilirubin 0.4 0.1 - 1.5 mg/dL    Albumin 4.9 3.2 - 4.9 g/dL    Total Protein 8.0 6.0 - 8.2 g/dL    Globulin 3.1 1.9 - 3.5 g/dL    A-G Ratio 1.6 g/dL   LIPASE   Result Value Ref Range    Lipase 39 11 - 82 U/L   URINALYSIS CULTURE, IF INDICATED   Result Value Ref  Range    Color Yellow     Character Clear     Specific Gravity 1.020 <1.035    Ph 5.0 5.0 - 8.0    Glucose Negative Negative mg/dL    Ketones Trace (A) Negative mg/dL    Protein Negative Negative mg/dL    Bilirubin Negative Negative    Urobilinogen, Urine 0.2 Negative    Nitrite Positive (A) Negative    Leukocyte Esterase Negative Negative    Occult Blood Negative Negative    Micro Urine Req Microscopic    BETA-HCG QUANTITATIVE SERUM   Result Value Ref Range    Bhcg 75897.0 (H) 0.0 - 5.0 mIU/mL   ESTIMATED GFR   Result Value Ref Range    GFR If African American >60 >60 mL/min/1.73 m 2    GFR If Non African American >60 >60 mL/min/1.73 m 2   URINE MICROSCOPIC (W/UA)   Result Value Ref Range    WBC 0-2 /hpf    RBC 0-2 /hpf    Bacteria Many (A) None /hpf    Epithelial Cells Negative /hpf    Hyaline Cast 0-2 /lpf       All labs reviewed by me.    RADIOLOGY  US-OB TRANSVAGINAL ONLY   Final Result         1.  Intrauterine pregnancy at calculated gestational age 5 weeks 5 days for estimated date of delivery June 13, 2019.   2.  Fetal pole is not visualized, cannot assess for viability   3.  Trace subchorionic hemorrhage.        The radiologist's interpretation of all radiological studies have been reviewed by me.    COURSE & MEDICAL DECISION MAKING  Pertinent Labs & Imaging studies reviewed. (See chart for details)    Obtained and reviewed past medical records.  Presented August 30 with a fall and a deep laceration noted to the scalp.  Patient reported a ground-level fall at the time.  Laceration note reviewed.  It appears, 4-0 Vicryl sutures were placed, this was end of August.    4:47 AM - Patient seen and examined at bedside.  This is a previously healthy 23-year-old female with past medical history of anxiety who reports early pregnancy.  LMP mid September.  Now she is having lower abdominal pain and low back pain.  She overall appears well.  She has a nonsurgical abdomen on initial examination.    Differential  diagnosis includes but not limited to: UTI, abdominal pain in pregnancy, threatened miscarriage, ectopic pregnancy     4:52 AM she is given IV fluids for history of nausea and vomiting, and clinical dehydration.  She is not given p.o. fluids for history of recent vomiting and the possibility of surgical etiology.    3 evaluated the bedside.  We discussed ultrasound findings.  Early IUP with no heart beat.  She is advised on follow-up for this.  Suspect that it is early pregnancy but may be a threatened miscarriage.  She does have evidence of urinary tract infection she will be discharged home with antibiotics.  She is overall well-appearing and nontoxic.  To be discharged home in stable condition.  She is given an opportunity for questions and strict return precautions.      FINAL IMPRESSION  1. Acute cystitis without hematuria    2. Threatened miscarriage    3. Abdominal pain during pregnancy in first trimester

## 2018-11-04 ENCOUNTER — HOSPITAL ENCOUNTER (EMERGENCY)
Facility: MEDICAL CENTER | Age: 23
End: 2018-11-04
Attending: EMERGENCY MEDICINE
Payer: COMMERCIAL

## 2018-11-04 ENCOUNTER — APPOINTMENT (OUTPATIENT)
Dept: RADIOLOGY | Facility: MEDICAL CENTER | Age: 23
End: 2018-11-04
Attending: EMERGENCY MEDICINE
Payer: COMMERCIAL

## 2018-11-04 VITALS
HEART RATE: 95 BPM | OXYGEN SATURATION: 99 % | TEMPERATURE: 98.1 F | HEIGHT: 70 IN | WEIGHT: 161.6 LBS | RESPIRATION RATE: 18 BRPM | DIASTOLIC BLOOD PRESSURE: 78 MMHG | SYSTOLIC BLOOD PRESSURE: 120 MMHG | BODY MASS INDEX: 23.13 KG/M2

## 2018-11-04 DIAGNOSIS — O20.0 THREATENED MISCARRIAGE IN EARLY PREGNANCY: ICD-10-CM

## 2018-11-04 DIAGNOSIS — Z3A.08 8 WEEKS GESTATION OF PREGNANCY: ICD-10-CM

## 2018-11-04 LAB
APPEARANCE UR: CLEAR
B-HCG SERPL-ACNC: ABNORMAL MIU/ML (ref 0–10)
BASOPHILS # BLD AUTO: 0.6 % (ref 0–1.8)
BASOPHILS # BLD: 0.05 K/UL (ref 0–0.12)
BILIRUB UR QL STRIP.AUTO: NEGATIVE
COLOR UR: YELLOW
EOSINOPHIL # BLD AUTO: 0.15 K/UL (ref 0–0.51)
EOSINOPHIL NFR BLD: 1.8 % (ref 0–6.9)
ERYTHROCYTE [DISTWIDTH] IN BLOOD BY AUTOMATED COUNT: 48.1 FL (ref 35.9–50)
GLUCOSE UR STRIP.AUTO-MCNC: NEGATIVE MG/DL
HCT VFR BLD AUTO: 36 % (ref 37–47)
HGB BLD-MCNC: 12 G/DL (ref 12–16)
IMM GRANULOCYTES # BLD AUTO: 0.04 K/UL (ref 0–0.11)
IMM GRANULOCYTES NFR BLD AUTO: 0.5 % (ref 0–0.9)
KETONES UR STRIP.AUTO-MCNC: NEGATIVE MG/DL
LEUKOCYTE ESTERASE UR QL STRIP.AUTO: NEGATIVE
LYMPHOCYTES # BLD AUTO: 2.18 K/UL (ref 1–4.8)
LYMPHOCYTES NFR BLD: 25.6 % (ref 22–41)
MCH RBC QN AUTO: 31.3 PG (ref 27–33)
MCHC RBC AUTO-ENTMCNC: 33.3 G/DL (ref 33.6–35)
MCV RBC AUTO: 93.8 FL (ref 81.4–97.8)
MICRO URNS: NORMAL
MONOCYTES # BLD AUTO: 1.03 K/UL (ref 0–0.85)
MONOCYTES NFR BLD AUTO: 12.1 % (ref 0–13.4)
NEUTROPHILS # BLD AUTO: 5.05 K/UL (ref 2–7.15)
NEUTROPHILS NFR BLD: 59.4 % (ref 44–72)
NITRITE UR QL STRIP.AUTO: NEGATIVE
NRBC # BLD AUTO: 0 K/UL
NRBC BLD-RTO: 0 /100 WBC
NUMBER OF RH DOSES IND 8505RD: NORMAL
PH UR STRIP.AUTO: 7 [PH]
PLATELET # BLD AUTO: 183 K/UL (ref 164–446)
PMV BLD AUTO: 11.1 FL (ref 9–12.9)
PROT UR QL STRIP: NEGATIVE MG/DL
RBC # BLD AUTO: 3.84 M/UL (ref 4.2–5.4)
RBC UR QL AUTO: NEGATIVE
RH BLD: NORMAL
SP GR UR STRIP.AUTO: 1.01
WBC # BLD AUTO: 8.5 K/UL (ref 4.8–10.8)

## 2018-11-04 PROCEDURE — 84702 CHORIONIC GONADOTROPIN TEST: CPT

## 2018-11-04 PROCEDURE — 86901 BLOOD TYPING SEROLOGIC RH(D): CPT

## 2018-11-04 PROCEDURE — 81003 URINALYSIS AUTO W/O SCOPE: CPT

## 2018-11-04 PROCEDURE — 85025 COMPLETE CBC W/AUTO DIFF WBC: CPT

## 2018-11-04 PROCEDURE — 36415 COLL VENOUS BLD VENIPUNCTURE: CPT

## 2018-11-04 PROCEDURE — 99284 EMERGENCY DEPT VISIT MOD MDM: CPT

## 2018-11-04 PROCEDURE — 76817 TRANSVAGINAL US OBSTETRIC: CPT

## 2018-11-04 ASSESSMENT — PAIN SCALES - GENERAL: PAINLEVEL_OUTOF10: 0

## 2018-11-04 NOTE — ED TRIAGE NOTES
"Chief Complaint   Patient presents with   • Pregnancy     Pt states she is 9 weeks pregnant and finished antibiotics for a UTI.  Pt states today she started bleeding, denies cramping.     /73   Pulse 93   Temp 36.7 °C (98.1 °F)   Resp 18   Ht 1.778 m (5' 10\")   Wt 73.3 kg (161 lb 9.6 oz)   LMP 09/13/2018 (Approximate)   SpO2 98%   BMI 23.19 kg/m²     "

## 2018-11-05 NOTE — ED NOTES
Discharge instructions provided.  Pt verbalized the understanding of discharge instructions to follow up with PCP and to return to ER if condition worsens. NO prescription given. Pt ambulated out of ER without difficulty.

## 2018-11-05 NOTE — ED PROVIDER NOTES
"ED Provider Note    CHIEF COMPLAINT  Chief Complaint   Patient presents with   • Pregnancy     Pt states she is 9 weeks pregnant and finished antibiotics for a UTI.  Pt states today she started bleeding, denies cramping.       HPI  Gwendolyn Masters is a 23 y.o. female who presents to emerge department for vaginal bleeding.  Past medical history is largely benign.  She believes that she is approximately 9 weeks pregnant.  She explains that she was seen at the [facility] ER approximately 3-4 weeks ago for abdominal pain.  She was diagnosed with pregnancy as well as urinary tract infection and constipation.  She was prescribed an antibiotic to which she finished and her symptoms had improved.  2 days ago however she started to have recurrent dysuria and low back discomfort.  Today she had scant vaginal bleeding to which she noticed bright red blood on toilet paper when wiping and this afternoon.  She has not had any abdominal pain.  No celso hemorrhage.  She is wearing a pad although no blood tinge as of yet.  No nausea currently although she does note that she does have some \"morning sickness\" occasionally.  She does not currently have an OB/GYN.  She did call the pregnancy center however they stated that they would not see her without insurance.    REVIEW OF SYSTEMS  See HPI for further details. All other systems are negative.     PAST MEDICAL HISTORY   has a past medical history of Arm fracture, right.    SOCIAL HISTORY  Social History     Social History Main Topics   • Smoking status: Former Smoker     Packs/day: 0.15     Types: Cigarettes     Quit date: 5/14/2017   • Smokeless tobacco: Never Used   • Alcohol use No      Comment: rare   • Drug use: No      Comment: Inhaled THC, quit 1 week ago   • Sexual activity: Not on file       SURGICAL HISTORY   has a past surgical history that includes appendectomy laparoscopic (7/26/2016); laparoscopic lysis of adhesions (N/A, 9/4/2016); and other orthopedic " "surgery.    CURRENT MEDICATIONS  Home Medications     Reviewed by Jackie Couch R.N. (Registered Nurse) on 11/04/18 at 1507  Med List Status: Not Addressed   Medication Last Dose Status        Patient Bora Taking any Medications                       ALLERGIES  Allergies   Allergen Reactions   • Erythromycin Unspecified     \"Childhood allergie\".     • Morphine Hives       PHYSICAL EXAM  VITAL SIGNS: /73   Pulse 93   Temp 36.7 °C (98.1 °F)   Resp 18   Ht 1.778 m (5' 10\")   Wt 73.3 kg (161 lb 9.6 oz)   LMP 09/13/2018 (Approximate)   SpO2 98%   BMI 23.19 kg/m²  @PRAVEEN[[id number]::@   Pulse ox interpretation: I interpret this pulse ox as normal.  Constitutional: Alert in no apparent distress.  HENT: No signs of trauma, Bilateral external ears normal, Nose normal.   Eyes: Pupils are equal and reactive, Conjunctiva normal, Non-icteric.   Neck: Normal range of motion, No tenderness, Supple, No stridor. .   Cardiovascular: Regular rate and rhythm, no murmurs.   Thorax & Lungs: Normal breath sounds, No respiratory distress, No wheezing, No chest tenderness.   Abdomen: Bowel sounds normal, Soft, No tenderness, No masses, No pulsatile masses. No peritoneal signs.  Skin: Warm, Dry, No erythema, No rash.   Back: No bony tenderness, No CVA tenderness.   Extremities: Intact distal pulses, No edema, No tenderness, No cyanosis,  Negative Patricia's sign.   Musculoskeletal: Good range of motion in all major joints. No tenderness to palpation or major deformities noted.   Neurologic: Alert , Normal motor function, Normal sensory function, No focal deficits noted.   Psychiatric: Affect normal, Judgment normal, Mood normal.       DIAGNOSTIC STUDIES / PROCEDURES    LABS  Results for orders placed or performed during the hospital encounter of 11/04/18   URINALYSIS,CULTURE IF INDICATED   Result Value Ref Range    Color Yellow     Character Clear     Specific Gravity 1.015 <1.035    Ph 7.0 5.0 - 8.0    Glucose Negative Negative " mg/dL    Ketones Negative Negative mg/dL    Protein Negative Negative mg/dL    Bilirubin Negative Negative    Nitrite Negative Negative    Leukocyte Esterase Negative Negative    Occult Blood Negative Negative    Micro Urine Req see below    CBC WITH DIFFERENTIAL   Result Value Ref Range    WBC 8.5 4.8 - 10.8 K/uL    RBC 3.84 (L) 4.20 - 5.40 M/uL    Hemoglobin 12.0 12.0 - 16.0 g/dL    Hematocrit 36.0 (L) 37.0 - 47.0 %    MCV 93.8 81.4 - 97.8 fL    MCH 31.3 27.0 - 33.0 pg    MCHC 33.3 (L) 33.6 - 35.0 g/dL    RDW 48.1 35.9 - 50.0 fL    Platelet Count 183 164 - 446 K/uL    MPV 11.1 9.0 - 12.9 fL    Neutrophils-Polys 59.40 44.00 - 72.00 %    Lymphocytes 25.60 22.00 - 41.00 %    Monocytes 12.10 0.00 - 13.40 %    Eosinophils 1.80 0.00 - 6.90 %    Basophils 0.60 0.00 - 1.80 %    Immature Granulocytes 0.50 0.00 - 0.90 %    Nucleated RBC 0.00 /100 WBC    Neutrophils (Absolute) 5.05 2.00 - 7.15 K/uL    Lymphs (Absolute) 2.18 1.00 - 4.80 K/uL    Monos (Absolute) 1.03 (H) 0.00 - 0.85 K/uL    Eos (Absolute) 0.15 0.00 - 0.51 K/uL    Baso (Absolute) 0.05 0.00 - 0.12 K/uL    Immature Granulocytes (abs) 0.04 0.00 - 0.11 K/uL    NRBC (Absolute) 0.00 K/uL   RH TYPE FOR RHOGAM FROM E.D.   Result Value Ref Range    Emergency Department Rh Typing POS     Number Of Rh Doses Indicated ZERO    HCG QUANTITATIVE SERUM   Result Value Ref Range    Bhcg 795012.0 (H) 0.0 - 10.0 mIU/mL         RADIOLOGY  US-OB TRANSVAGINAL ONLY   Final Result      Single living intrauterine pregnancy at 8 weeks 2 days  gestation by ultrasound.            COURSE & MEDICAL DECISION MAKING  Pertinent Labs & Imaging studies reviewed. (See chart for details)  Patient presented to emerge from with vaginal bleeding.  History and physical exam as above.  Patient stated that she was having some dysuria although no evidence of urinary tract infection today after finishing her antibiotics.  Furthermore given the vaginal bleeding and current pregnancy concern about possible  threatened or ongoing .  Fortunately there is evidence of appropriately rising beta hCG and interval progression of ultrasound findings of single IUP.  Today dates at 8 weeks and 2 days.  I have asked the patient continue with pelvic rest given her vaginal bleeding and have told her that this by definition increase the risk of possibility of future miscarriage.  She will continue to work on establishing her Medicaid and will follow up with the pregnancy center as soon as possible.  She is understanding return precautions in the interim.  She will use over-the-counter Unisom and B6 for nausea.    The patient will return for worsening symptoms and is stable at the time of discharge. The patient verbalizes understanding and will comply.    FINAL IMPRESSION  1. 8 weeks gestation of pregnancy    2. Threatened miscarriage in early pregnancy            Electronically signed by: Garo Polk, 2018 5:39 PM

## 2018-11-05 NOTE — ED NOTES
ERP at bedside. Pt agrees with plan of care discussed by ERP. AIDET acknowledged with patient. Светлана in low position, side rail up for pt safety. IV established. Blood sent to lab.  Urine to lab. Call light within reach. Will continue to monitor.

## 2018-12-16 ENCOUNTER — HOSPITAL ENCOUNTER (EMERGENCY)
Facility: MEDICAL CENTER | Age: 23
End: 2018-12-16
Attending: EMERGENCY MEDICINE
Payer: COMMERCIAL

## 2018-12-16 ENCOUNTER — APPOINTMENT (OUTPATIENT)
Dept: RADIOLOGY | Facility: MEDICAL CENTER | Age: 23
End: 2018-12-16
Payer: COMMERCIAL

## 2018-12-16 VITALS
HEIGHT: 70 IN | HEART RATE: 88 BPM | OXYGEN SATURATION: 97 % | DIASTOLIC BLOOD PRESSURE: 74 MMHG | RESPIRATION RATE: 20 BRPM | BODY MASS INDEX: 23.04 KG/M2 | TEMPERATURE: 97.2 F | SYSTOLIC BLOOD PRESSURE: 115 MMHG | WEIGHT: 160.94 LBS

## 2018-12-16 DIAGNOSIS — Z3A.15 15 WEEKS GESTATION OF PREGNANCY: ICD-10-CM

## 2018-12-16 DIAGNOSIS — K59.00 CONSTIPATION, UNSPECIFIED CONSTIPATION TYPE: ICD-10-CM

## 2018-12-16 LAB
ALBUMIN SERPL BCP-MCNC: 4 G/DL (ref 3.2–4.9)
ALBUMIN/GLOB SERPL: 1.4 G/DL
ALP SERPL-CCNC: 32 U/L (ref 30–99)
ALT SERPL-CCNC: 16 U/L (ref 2–50)
ANION GAP SERPL CALC-SCNC: 8 MMOL/L (ref 0–11.9)
APPEARANCE UR: ABNORMAL
AST SERPL-CCNC: 22 U/L (ref 12–45)
B-HCG SERPL-ACNC: ABNORMAL MIU/ML (ref 0–10)
BACTERIA #/AREA URNS HPF: ABNORMAL /HPF
BASOPHILS # BLD AUTO: 0.7 % (ref 0–1.8)
BASOPHILS # BLD: 0.06 K/UL (ref 0–0.12)
BILIRUB SERPL-MCNC: 0.5 MG/DL (ref 0.1–1.5)
BILIRUB UR QL STRIP.AUTO: NEGATIVE
BUN SERPL-MCNC: 8 MG/DL (ref 8–22)
CALCIUM SERPL-MCNC: 9.8 MG/DL (ref 8.4–10.2)
CHLORIDE SERPL-SCNC: 102 MMOL/L (ref 96–112)
CO2 SERPL-SCNC: 21 MMOL/L (ref 20–33)
COLOR UR: YELLOW
CREAT SERPL-MCNC: 0.65 MG/DL (ref 0.5–1.4)
EOSINOPHIL # BLD AUTO: 0.05 K/UL (ref 0–0.51)
EOSINOPHIL NFR BLD: 0.6 % (ref 0–6.9)
EPI CELLS #/AREA URNS HPF: ABNORMAL /HPF
ERYTHROCYTE [DISTWIDTH] IN BLOOD BY AUTOMATED COUNT: 46.5 FL (ref 35.9–50)
GLOBULIN SER CALC-MCNC: 2.9 G/DL (ref 1.9–3.5)
GLUCOSE SERPL-MCNC: 79 MG/DL (ref 65–99)
GLUCOSE UR STRIP.AUTO-MCNC: NEGATIVE MG/DL
HCT VFR BLD AUTO: 36.3 % (ref 37–47)
HGB BLD-MCNC: 12.3 G/DL (ref 12–16)
IMM GRANULOCYTES # BLD AUTO: 0.03 K/UL (ref 0–0.11)
IMM GRANULOCYTES NFR BLD AUTO: 0.4 % (ref 0–0.9)
KETONES UR STRIP.AUTO-MCNC: >=80 MG/DL
LEUKOCYTE ESTERASE UR QL STRIP.AUTO: NEGATIVE
LIPASE SERPL-CCNC: 29 U/L (ref 7–58)
LYMPHOCYTES # BLD AUTO: 1.63 K/UL (ref 1–4.8)
LYMPHOCYTES NFR BLD: 19.6 % (ref 22–41)
MCH RBC QN AUTO: 31.4 PG (ref 27–33)
MCHC RBC AUTO-ENTMCNC: 33.9 G/DL (ref 33.6–35)
MCV RBC AUTO: 92.6 FL (ref 81.4–97.8)
MICRO URNS: ABNORMAL
MONOCYTES # BLD AUTO: 0.68 K/UL (ref 0–0.85)
MONOCYTES NFR BLD AUTO: 8.2 % (ref 0–13.4)
MUCOUS THREADS #/AREA URNS HPF: ABNORMAL /HPF
NEUTROPHILS # BLD AUTO: 5.85 K/UL (ref 2–7.15)
NEUTROPHILS NFR BLD: 70.5 % (ref 44–72)
NITRITE UR QL STRIP.AUTO: NEGATIVE
NRBC # BLD AUTO: 0 K/UL
NRBC BLD-RTO: 0 /100 WBC
PH UR STRIP.AUTO: 6 [PH]
PLATELET # BLD AUTO: 192 K/UL (ref 164–446)
PMV BLD AUTO: 10.9 FL (ref 9–12.9)
POTASSIUM SERPL-SCNC: 3.7 MMOL/L (ref 3.6–5.5)
PROT SERPL-MCNC: 6.9 G/DL (ref 6–8.2)
PROT UR QL STRIP: NEGATIVE MG/DL
RBC # BLD AUTO: 3.92 M/UL (ref 4.2–5.4)
RBC # URNS HPF: ABNORMAL /HPF
RBC UR QL AUTO: NEGATIVE
SODIUM SERPL-SCNC: 131 MMOL/L (ref 135–145)
SP GR UR STRIP.AUTO: 1.02
WBC # BLD AUTO: 8.3 K/UL (ref 4.8–10.8)
WBC #/AREA URNS HPF: ABNORMAL /HPF
YEAST #/AREA URNS HPF: ABNORMAL /HPF

## 2018-12-16 PROCEDURE — 700101 HCHG RX REV CODE 250: Performed by: EMERGENCY MEDICINE

## 2018-12-16 PROCEDURE — 700111 HCHG RX REV CODE 636 W/ 250 OVERRIDE (IP): Performed by: EMERGENCY MEDICINE

## 2018-12-16 PROCEDURE — 80053 COMPREHEN METABOLIC PANEL: CPT

## 2018-12-16 PROCEDURE — 83690 ASSAY OF LIPASE: CPT

## 2018-12-16 PROCEDURE — 81001 URINALYSIS AUTO W/SCOPE: CPT

## 2018-12-16 PROCEDURE — 76815 OB US LIMITED FETUS(S): CPT

## 2018-12-16 PROCEDURE — 99284 EMERGENCY DEPT VISIT MOD MDM: CPT

## 2018-12-16 PROCEDURE — 36415 COLL VENOUS BLD VENIPUNCTURE: CPT

## 2018-12-16 PROCEDURE — 85025 COMPLETE CBC W/AUTO DIFF WBC: CPT

## 2018-12-16 PROCEDURE — 84702 CHORIONIC GONADOTROPIN TEST: CPT

## 2018-12-16 RX ORDER — ENEMA 19; 7 G/133ML; G/133ML
1 ENEMA RECTAL ONCE
Status: COMPLETED | OUTPATIENT
Start: 2018-12-16 | End: 2018-12-16

## 2018-12-16 RX ORDER — METOCLOPRAMIDE 10 MG/1
10 TABLET ORAL 3 TIMES DAILY PRN
Qty: 30 TAB | Refills: 0 | Status: ON HOLD | OUTPATIENT
Start: 2018-12-16 | End: 2019-06-14

## 2018-12-16 RX ORDER — ONDANSETRON 4 MG/1
4 TABLET, ORALLY DISINTEGRATING ORAL ONCE
Status: COMPLETED | OUTPATIENT
Start: 2018-12-16 | End: 2018-12-16

## 2018-12-16 RX ADMIN — SODIUM PHOSPHATE 133 ML: 7; 19 ENEMA RECTAL at 18:48

## 2018-12-16 RX ADMIN — ONDANSETRON 4 MG: 4 TABLET, ORALLY DISINTEGRATING ORAL at 17:19

## 2018-12-16 ASSESSMENT — PAIN SCALES - GENERAL: PAINLEVEL_OUTOF10: 3

## 2018-12-16 NOTE — ED TRIAGE NOTES
"Pt presents with 15-week pregnancy history.  She C/O constipation with diffuse abdominal pain, and recurring episodes of N/V persisting for the past \"few days.\"   "

## 2018-12-17 NOTE — DISCHARGE INSTRUCTIONS
Call in obstetric clinic first thing tomorrow morning and arrange prenatal care as soon as possible.  Drink a lot of healthy fluids to improve your level of hydration.  There are no obstetric services available at this hospital therefore if you have new or worsening symptoms during this pregnancy go directly to [facility] emergency department on [facility] for recheck

## 2018-12-17 NOTE — ED NOTES
ERP at bedside. Pt agrees with plan of care discussed by ERP. AIDET acknowledged with patient. Светлана in low position, side rail up for pt safety. Call light within reach. Will continue to monitor.

## 2018-12-17 NOTE — ED PROVIDER NOTES
ED Provider Note    CHIEF COMPLAINT  Chief Complaint   Patient presents with   • Pregnancy   • Constipation   • Cramps       HPI  Gwendolyn Masters is a 23 y.o. female who presents to the emergency department complaining of constipation during pregnancy.  The patient says she is approximately 15 weeks pregnant, she has not yet had any prenatal care but plans to go to the pregnancy center.  For the last 2 weeks she has had constipation, she is passing small amounts of stool and a lot of flatus she has had some nausea and vomiting especially earlier in the pregnancy and she does not have any nausea medicines at home.  She was in the ER 2 weeks ago with similar symptoms and was started on MiraLAX and was told that she had a urinary tract infection was started on antibiotics.  She has had no pelvic pain or vaginal bleeding.    REVIEW OF SYSTEMS no fever or chills no black or bloody stool or emesis.  All other systems negative    PAST MEDICAL HISTORY  Past Medical History:   Diagnosis Date   • Arm fracture, right     x2       FAMILY HISTORY  History reviewed. No pertinent family history.    SOCIAL HISTORY  Social History     Social History   • Marital status: Single     Spouse name: N/A   • Number of children: N/A   • Years of education: N/A     Social History Main Topics   • Smoking status: Former Smoker     Packs/day: 0.15     Types: Cigarettes     Quit date: 5/14/2017   • Smokeless tobacco: Never Used   • Alcohol use No   • Drug use: No      Comment: Inhaled THC, quit 1 week ago   • Sexual activity: Not on file     Other Topics Concern   • Not on file     Social History Narrative   • No narrative on file       SURGICAL HISTORY  Past Surgical History:   Procedure Laterality Date   • LAPAROSCOPIC LYSIS OF ADHESIONS N/A 9/4/2016    Procedure: LAPAROSCOPIC LYSIS OF ADHESIONS;  Surgeon: Venecia Hawk M.D.;  Location: SURGERY [facility];  Service:    • APPENDECTOMY LAPAROSCOPIC  7/26/2016    Procedure: APPENDECTOMY  "LAPAROSCOPIC;  Surgeon: Joao Dorantes M.D.;  Location: SURGERY [facility];  Service:    • OTHER ORTHOPEDIC SURGERY      meniscus repair - RIGHT       CURRENT MEDICATIONS  Home Medications    **Home medications have not yet been reviewed for this encounter**         ALLERGIES  Allergies   Allergen Reactions   • Erythromycin Unspecified     \"Childhood allergie\".     • Morphine Hives       PHYSICAL EXAM  VITAL SIGNS: /74   Pulse 88   Temp 36.2 °C (97.2 °F) (Temporal)   Resp 20   Ht 1.778 m (5' 10\")   Wt 73 kg (160 lb 15 oz)   LMP 09/13/2018 (Approximate)   SpO2 97%   BMI 23.09 kg/m²    Oxygen saturation is interpreted as adequate  Constitutional: Awake and nontoxic-appearing  HENT: Mucous membranes are moist dry  Eyes: No erythema discharge or jaundice  Neck: Trachea midline no JVD  Cardiovascular: Regular rate and rhythm  Lungs: Clear and equal bilaterally with no apparent difficulty breathing  Abdomen/Back: Soft nontender, gravid, very active bowel sounds, a rectal examination was done with a nurse chaperone at the bedside and there is no stool in the rectal vault and I did not see any thrombosed hemorrhoids.  Skin: Warm and dry  Musculoskeletal: No acute bony deformity  Neurologic: Awake verbal and moving all extremities    Laboratory  CBC shows white blood cell count of 8.3 hemoglobin is adequate 12.3 complete metabolic panel and lipase are unremarkable beta hCG value is 43,363, Rh is positive on November 4, 2018 urinalysis is negative for nitrite leukocyte esterase and blood also negative for glucose and proteins but slightly positive for ketones    Radiology  US-OB LIMITED TRANSABDOMINAL   Final Result      Single viable intrauterine gestation with an estimated gestational age of 14 weeks 6 days with an estimated date of delivery of 6/10/2019.          MEDICAL DECISION MAKING and DISPOSITION  In the emergency department an IV was established the patient was given Zofran for nausea.  Because of " her complaints of persistent constipation a fleets enema was administered and she says that she did pass a small amount of stool.  At this point in time I reviewed all the findings with the patient and I think will be safe for her to go home I have written her a prescription for Reglan and she says that she has had better luck using magnesium citrate in the past rather than MiraLAX for constipation so I have advised her that she may use that if needed for constipation she is also to drink a lot more fluids to improve her hydration.  I have advised the patient we have no obstetric services at this hospital therefore if she has any new or worsening symptoms during this pregnancy she is to go directly to [facility] [place] emergency department on [facility] for recheck.  The patient is also to establish prenatal care as soon as possible    IMPRESSION  1.  Constipation during pregnancy  2.  Nausea during pregnancy           Electronically signed by: Derek Oconnor, 12/16/2018 9:37 PM

## 2018-12-17 NOTE — ED NOTES
Prescriptions provided and discussed with patient, pt verbalized understanding. Discharge instructions provided.  Pt verbalized the understanding of discharge instructions to follow up with OBGYN,PCP and to return to ER if condition worsens.  Pt ambulated out of ER without difficulty.

## 2019-01-22 ENCOUNTER — INITIAL PRENATAL (OUTPATIENT)
Dept: OBGYN | Facility: CLINIC | Age: 24
End: 2019-01-22

## 2019-01-22 ENCOUNTER — HOSPITAL ENCOUNTER (OUTPATIENT)
Facility: MEDICAL CENTER | Age: 24
End: 2019-01-22
Attending: NURSE PRACTITIONER
Payer: MEDICAID

## 2019-01-22 VITALS
DIASTOLIC BLOOD PRESSURE: 64 MMHG | SYSTOLIC BLOOD PRESSURE: 106 MMHG | HEIGHT: 69 IN | BODY MASS INDEX: 25.33 KG/M2 | WEIGHT: 171 LBS

## 2019-01-22 DIAGNOSIS — Z3A.19 19 WEEKS GESTATION OF PREGNANCY: ICD-10-CM

## 2019-01-22 DIAGNOSIS — O09.619 ENCOUNTER FOR SUPERVISION OF PREGNANCY IN PRIMIGRAVIDA YOUNGER THAN 16 YEARS, ANTEPARTUM: ICD-10-CM

## 2019-01-22 LAB
APPEARANCE UR: CLEAR
BILIRUB UR STRIP-MCNC: NORMAL MG/DL
COLOR UR AUTO: YELLOW
GLUCOSE UR STRIP.AUTO-MCNC: NEGATIVE MG/DL
KETONES UR STRIP.AUTO-MCNC: NEGATIVE MG/DL
LEUKOCYTE ESTERASE UR QL STRIP.AUTO: NEGATIVE
NITRITE UR QL STRIP.AUTO: NEGATIVE
PH UR STRIP.AUTO: 7 [PH] (ref 5–8)
PROT UR QL STRIP: NEGATIVE MG/DL
RBC UR QL AUTO: NEGATIVE
SP GR UR STRIP.AUTO: 1.02
UROBILINOGEN UR STRIP-MCNC: NORMAL MG/DL

## 2019-01-22 PROCEDURE — 88175 CYTOPATH C/V AUTO FLUID REDO: CPT

## 2019-01-22 PROCEDURE — 81002 URINALYSIS NONAUTO W/O SCOPE: CPT | Performed by: NURSE PRACTITIONER

## 2019-01-22 PROCEDURE — 87591 N.GONORRHOEAE DNA AMP PROB: CPT

## 2019-01-22 PROCEDURE — 87491 CHLMYD TRACH DNA AMP PROBE: CPT

## 2019-01-22 PROCEDURE — 59401 PR NEW OB VISIT: CPT | Performed by: NURSE PRACTITIONER

## 2019-01-22 PROCEDURE — 90471 IMMUNIZATION ADMIN: CPT | Performed by: NURSE PRACTITIONER

## 2019-01-22 PROCEDURE — 90686 IIV4 VACC NO PRSV 0.5 ML IM: CPT | Performed by: NURSE PRACTITIONER

## 2019-01-22 NOTE — LETTER
Cystic Fibrosis Carrier Testing  Keyah Schofield    The following information is about a blood test that can be done to determine if you and/or your partner carry the gene for cystic fibrosis.    WHAT IS CYSTIC FIBROSIS?  · Cystic fibrosis (CF) is an inherited disease that affects more than 25,000 American children and young adults.  · Symptoms of CF vary but include lung congestion, pneumonia, diarrhea and poor growth.  Most people with CF have severe medical problems and some die at a young age.  Others have so few symptoms they are unaware they have CF.  · CF does not affect intelligence.  · Although there is no cure for CF at this time, scientists are making progress in improving treatment and in searching for a cure.  In the past many people with CF  at a very young age.  Today, many are living into their 20’s and 30’s.    IS THERE A CHANCE MY BABY COULD HAVE CYSTIC FIBROSIS?  · You can have a child with CF even if there is no history in your family (see chart below).  · CF testing can help determine if you are a carrier and at risk to have a child with CF.  Note: if both parents are carriers, there is a 1 in 4 (25%) chance with each pregnancy that they will have a child with CF.  · Carriers have one normal CF gene and one altered CF gene.  · People with CF have two altered CF genes.  · Most people have two normal copies of the CF gene.    Approximate risk that a couple with no family history of cystic fibrosis will have a child with cystic fibrosis:    Ethnic background / Risk     couple:  1 in 2,500   couple:  1 in 15,000            couple:  1 in 8,000     American couple:  1 in 32,000     WHAT TESTING IS AVAILABLE?  · There is a blood test that can be done to find out if you or your partner is a carrier.  · It is important to understand that CF carrier testing does not detect all CF carriers.  · If the test shows that you are both CF carriers, you unborn baby can be  tested to find out if the baby has CF.    HOW MUCH DOES IT COST TO HAVE CYSTIC FIBROSIS CARRIER TESTING?  · Cost and insurance coverage for CF carrier testing vary depending upon the laboratory used and your insurance policy.  · The average cost for CF carrier testing is $300 per person.  · Your genetic counselor can provide you with more information about cystic fibrosis carrier testing.    _____  Yes, I am interested in discussing carrier testing with a genetic counselor.    _____  No, I am not interested in CF carrier testing or in receiving more information about CF carrier testing.      Client signature: ________________________________________  1/22/2019

## 2019-01-22 NOTE — PROGRESS NOTES
Subjective:   Gwendolyn Masters is a 23 y.o.  who presents for her new OB exam.  She is 19w4d with an JETT of Estimated Date of Delivery: 19 by US at 8 weeks, unsure LMP. She is feeling well and has no concerns at this time. Denies VB, LOF, contractions or pain. No ER visits or previous care in this pregnancy. Denies dysuria, vaginal DC, fever. Reports some fetal movement. Desires AFP.  Declines CF.      Past Medical History:   Diagnosis Date   • Substance abuse (HCC)     Marijuana use. stopped 1 month ago       Psych Hx: Patient denies any history of depression, anxiety, PTSD, bipolar or any other psychological issues.     Past Surgical History:   Procedure Laterality Date   • LAPAROSCOPIC LYSIS OF ADHESIONS N/A 2016    Procedure: LAPAROSCOPIC LYSIS OF ADHESIONS;  Surgeon: Venecia Hawk M.D.;  Location: SURGERY [facility];  Service:    • APPENDECTOMY LAPAROSCOPIC  2016    Procedure: APPENDECTOMY LAPAROSCOPIC;  Surgeon: Joao Dorantes M.D.;  Location: SURGERY [facility];  Service:    • APPENDECTOMY     • OTHER ORTHOPEDIC SURGERY      meniscus repair - RIGHT        OB History    Para Term  AB Living   1             SAB TAB Ectopic Molar Multiple Live Births                    # Outcome Date GA Lbr Rainer/2nd Weight Sex Delivery Anes PTL Lv   1 Current                    Gynecological Hx: Denies any hx of STIs, including HSV. Denies any vulvovaginal disorders and no hx of abnormal cervical cytology. Last pap normal.     Sexual Hx: One current male partner, who is FOB     Contraceptive Hx: Has used condoms in the past and has since discontinued use.     History reviewed. No pertinent family history.  Denies any genetic disorders in family history.     Social History     Social History   • Marital status: Single     Spouse name: N/A   • Number of children: N/A   • Years of education: N/A     Occupational History   • Not on file.     Social History Main Topics   • Smoking status:  "Former Smoker     Packs/day: 0.15     Years: 1.00     Types: Cigarettes     Quit date: 5/14/2017   • Smokeless tobacco: Never Used   • Alcohol use No   • Drug use: Yes     Types: Marijuana      Comment: used marijuna 1 month ago   • Sexual activity: Not Currently     Partners: Male      Comment: Unplanned pregnancy. pt states was not on birth control     Other Topics Concern   • Not on file     Social History Narrative   • No narrative on file       FOB is involved and lives does with Gwendolyn Masters.  Pregnancy is unplanned but desired.    She is currently working at [employer], denies any heavy lifting or exposure to potential teratogens like environmental or occupational toxins.   Denies alcohol use, drug use, or tobacco use in pregnancy.   Denies any current or hx of sexual, emotional or physical abuse or trauma.     Current Medications: PNV   Allergies: Erythomycin and morphine     Objective:      Vitals:    01/22/19 1429   BP: 106/64   Weight: 77.6 kg (171 lb)   Height: 1.753 m (5' 9\")        See Prenatal Physical and Prenatal Vitals  UA WNL today      Assessment:      1.  IUP @ 19w4d per 8 week US      2.  S=D      3.  See problem list as follows     There are no active problems to display for this patient.        Plan:   -  GC/CT and pap ordered   - Prenatal labs ordered - lab slip given  - AFP ordered   - Discussed PNV, nutrition, adequate water intake, and exercise/weight gain in pregnancy  - NOB informational packet with anticipatory guidance given  - Information on Centering Pregnancy given, group full   - S/sx of pregnancy warning signs and PTL precautions given  - Complete OB US in next available  wks  - Return to [facility] in 4 wks  "

## 2019-01-22 NOTE — PROGRESS NOTES
Pt here today for NOB visit  LMP: 09/13/2018. Pt had 3 US at [facility].  WT: 171 lb  BP: 106/64  Pt states no complaints today.    Desires the influenza vaccine  Desires AFP Screening  Good # [phone]    Influenza vaccine given. Left Deltoid. VIS given and screening check list reviewed with pt.  Influenza vaccine verified by Sepideh Hammond.

## 2019-01-23 DIAGNOSIS — Z3A.19 19 WEEKS GESTATION OF PREGNANCY: ICD-10-CM

## 2019-01-24 ENCOUNTER — DATING (OUTPATIENT)
Dept: OBGYN | Facility: CLINIC | Age: 24
End: 2019-01-24

## 2019-01-24 ENCOUNTER — APPOINTMENT (OUTPATIENT)
Dept: RADIOLOGY | Facility: IMAGING CENTER | Age: 24
End: 2019-01-24
Attending: NURSE PRACTITIONER
Payer: MEDICAID

## 2019-01-24 DIAGNOSIS — Z3A.19 19 WEEKS GESTATION OF PREGNANCY: ICD-10-CM

## 2019-01-24 LAB
C TRACH DNA GENITAL QL NAA+PROBE: NEGATIVE
CYTOLOGY REG CYTOL: NORMAL
N GONORRHOEA DNA GENITAL QL NAA+PROBE: NEGATIVE
SPECIMEN SOURCE: NORMAL

## 2019-01-24 PROCEDURE — 76805 OB US >/= 14 WKS SNGL FETUS: CPT | Performed by: OBSTETRICS & GYNECOLOGY

## 2019-01-24 PROCEDURE — 99999 US-OB 2ND 3RD TRI COMPLETE: CPT | Mod: 26 | Performed by: NURSE PRACTITIONER

## 2019-02-05 DIAGNOSIS — K59.00 CONSTIPATION DURING PREGNANCY IN THIRD TRIMESTER: ICD-10-CM

## 2019-02-05 DIAGNOSIS — O99.613 CONSTIPATION DURING PREGNANCY IN THIRD TRIMESTER: ICD-10-CM

## 2019-02-05 DIAGNOSIS — O23.43 UTI (URINARY TRACT INFECTION) IN PREGNANCY IN THIRD TRIMESTER: ICD-10-CM

## 2019-02-05 NOTE — TELEPHONE ENCOUNTER
Notes recorded by KAYLEE Banuelos on 1/23/2019 at 1:43 PM PST  Please ask pt if she has any vaginal symptoms as we saw yeast and BV on pap, but if no sxs will not treat.    Called patient, no answer. VM is not set unable unable to leave a VM.

## 2019-02-19 ENCOUNTER — ROUTINE PRENATAL (OUTPATIENT)
Dept: OBGYN | Facility: CLINIC | Age: 24
End: 2019-02-19

## 2019-02-19 VITALS — SYSTOLIC BLOOD PRESSURE: 112 MMHG | DIASTOLIC BLOOD PRESSURE: 62 MMHG | WEIGHT: 177 LBS | BODY MASS INDEX: 26.14 KG/M2

## 2019-02-19 DIAGNOSIS — Z3A.24 24 WEEKS GESTATION OF PREGNANCY: ICD-10-CM

## 2019-02-19 PROCEDURE — 90040 PR PRENATAL FOLLOW UP: CPT | Performed by: NURSE PRACTITIONER

## 2019-02-19 NOTE — PROGRESS NOTES
Pt here today for OB follow up  Pt states having sciatica pain.   Reports +FM   Good # [phone]  Pharmacy Confirmed.  Pt denies itching or burning w/ vaginal discharge.   Pt will do labs asap.

## 2019-02-19 NOTE — PROGRESS NOTES
SUBJECTIVE:  Pt is a 23 y.o.   at 23w4d  gestation. Presents today for follow-up prenatal care. Reports no issues at this time.  Reports fetal movement. Denies cramping/contractions, bleeding or leaking of fluid. Denies dysuria, headaches, N/V, or other issues at this time. Generally feels well today. Reports lower back pain on right side that shoots down leg, on and off and with movement more.     OBJECTIVE:  - See prenatal vitals flow  -   Vitals:    19 1532   BP: 112/62   Weight: 80.3 kg (177 lb)                 ASSESSMENT:   - IUP at 23w4d    - S=D   -   Patient Active Problem List    Diagnosis Date Noted   • Supervision of first pregnancy 2019         PLAN:  - S/sx pregnancy and labor warning signs vs general discomforts discussed  - Fetal movements and kick counts reviewed   - Adequate hydration reinforced  - Nutrition/exercise/vitamin education; continued PNV  - S/p Flu vacc   - Anticipatory guidance given  - GCT ordered to be done after three days; to be done with PNP  - Sciatic nerve pain remedies reviewed   - RTC in 4 weeks for follow-up prenatal care

## 2019-03-19 ENCOUNTER — TELEPHONE (OUTPATIENT)
Dept: OBGYN | Facility: CLINIC | Age: 24
End: 2019-03-19

## 2019-03-19 ENCOUNTER — HOSPITAL ENCOUNTER (OUTPATIENT)
Dept: LAB | Facility: MEDICAL CENTER | Age: 24
End: 2019-03-19
Attending: NURSE PRACTITIONER
Payer: MEDICAID

## 2019-03-19 ENCOUNTER — ROUTINE PRENATAL (OUTPATIENT)
Dept: OBGYN | Facility: CLINIC | Age: 24
End: 2019-03-19
Payer: MEDICAID

## 2019-03-19 VITALS — DIASTOLIC BLOOD PRESSURE: 70 MMHG | WEIGHT: 179 LBS | BODY MASS INDEX: 26.43 KG/M2 | SYSTOLIC BLOOD PRESSURE: 110 MMHG

## 2019-03-19 DIAGNOSIS — Z34.00 SUPERVISION OF NORMAL FIRST PREGNANCY, ANTEPARTUM: Primary | ICD-10-CM

## 2019-03-19 DIAGNOSIS — Z3A.24 24 WEEKS GESTATION OF PREGNANCY: ICD-10-CM

## 2019-03-19 DIAGNOSIS — Z3A.19 19 WEEKS GESTATION OF PREGNANCY: ICD-10-CM

## 2019-03-19 LAB
ABO GROUP BLD: NORMAL
APPEARANCE UR: ABNORMAL
BACTERIA #/AREA URNS HPF: ABNORMAL /HPF
BASOPHILS # BLD AUTO: 0.6 % (ref 0–1.8)
BASOPHILS # BLD: 0.06 K/UL (ref 0–0.12)
BILIRUB UR QL STRIP.AUTO: NEGATIVE
BLD GP AB SCN SERPL QL: NORMAL
COLOR UR: YELLOW
EOSINOPHIL # BLD AUTO: 0.07 K/UL (ref 0–0.51)
EOSINOPHIL NFR BLD: 0.7 % (ref 0–6.9)
EPI CELLS #/AREA URNS HPF: ABNORMAL /HPF
ERYTHROCYTE [DISTWIDTH] IN BLOOD BY AUTOMATED COUNT: 49.6 FL (ref 35.9–50)
GLUCOSE 1H P 50 G GLC PO SERPL-MCNC: 102 MG/DL (ref 70–139)
GLUCOSE UR STRIP.AUTO-MCNC: NEGATIVE MG/DL
HBV SURFACE AG SER QL: NEGATIVE
HCT VFR BLD AUTO: 38.6 % (ref 37–47)
HGB BLD-MCNC: 12.3 G/DL (ref 12–16)
HIV 1+2 AB+HIV1 P24 AG SERPL QL IA: NON REACTIVE
IMM GRANULOCYTES # BLD AUTO: 0.06 K/UL (ref 0–0.11)
IMM GRANULOCYTES NFR BLD AUTO: 0.6 % (ref 0–0.9)
KETONES UR STRIP.AUTO-MCNC: NEGATIVE MG/DL
LEUKOCYTE ESTERASE UR QL STRIP.AUTO: NEGATIVE
LYMPHOCYTES # BLD AUTO: 2.03 K/UL (ref 1–4.8)
LYMPHOCYTES NFR BLD: 19.7 % (ref 22–41)
MCH RBC QN AUTO: 32 PG (ref 27–33)
MCHC RBC AUTO-ENTMCNC: 31.9 G/DL (ref 33.6–35)
MCV RBC AUTO: 100.5 FL (ref 81.4–97.8)
MICRO URNS: ABNORMAL
MONOCYTES # BLD AUTO: 0.76 K/UL (ref 0–0.85)
MONOCYTES NFR BLD AUTO: 7.4 % (ref 0–13.4)
NEUTROPHILS # BLD AUTO: 7.35 K/UL (ref 2–7.15)
NEUTROPHILS NFR BLD: 71 % (ref 44–72)
NITRITE UR QL STRIP.AUTO: POSITIVE
NRBC # BLD AUTO: 0 K/UL
NRBC BLD-RTO: 0 /100 WBC
PH UR STRIP.AUTO: 7 [PH]
PLATELET # BLD AUTO: 191 K/UL (ref 164–446)
PMV BLD AUTO: 12.4 FL (ref 9–12.9)
PROT UR QL STRIP: NEGATIVE MG/DL
RBC # BLD AUTO: 3.84 M/UL (ref 4.2–5.4)
RBC # URNS HPF: ABNORMAL /HPF
RBC UR QL AUTO: NEGATIVE
RH BLD: NORMAL
RUBV AB SER QL: 138.2 IU/ML
SP GR UR STRIP.AUTO: 1.02
TREPONEMA PALLIDUM IGG+IGM AB [PRESENCE] IN SERUM OR PLASMA BY IMMUNOASSAY: NON REACTIVE
UROBILINOGEN UR STRIP.AUTO-MCNC: 0.2 MG/DL
WBC # BLD AUTO: 10.3 K/UL (ref 4.8–10.8)
WBC #/AREA URNS HPF: ABNORMAL /HPF

## 2019-03-19 PROCEDURE — 90040 PR PRENATAL FOLLOW UP: CPT | Performed by: NURSE PRACTITIONER

## 2019-03-19 PROCEDURE — 90715 TDAP VACCINE 7 YRS/> IM: CPT | Performed by: NURSE PRACTITIONER

## 2019-03-19 PROCEDURE — 86901 BLOOD TYPING SEROLOGIC RH(D): CPT

## 2019-03-19 PROCEDURE — 86762 RUBELLA ANTIBODY: CPT

## 2019-03-19 PROCEDURE — 85025 COMPLETE CBC W/AUTO DIFF WBC: CPT

## 2019-03-19 PROCEDURE — 87389 HIV-1 AG W/HIV-1&-2 AB AG IA: CPT

## 2019-03-19 PROCEDURE — 86900 BLOOD TYPING SEROLOGIC ABO: CPT

## 2019-03-19 PROCEDURE — 82950 GLUCOSE TEST: CPT

## 2019-03-19 PROCEDURE — 81001 URINALYSIS AUTO W/SCOPE: CPT

## 2019-03-19 PROCEDURE — 90471 IMMUNIZATION ADMIN: CPT | Performed by: NURSE PRACTITIONER

## 2019-03-19 PROCEDURE — 86850 RBC ANTIBODY SCREEN: CPT

## 2019-03-19 PROCEDURE — 36415 COLL VENOUS BLD VENIPUNCTURE: CPT

## 2019-03-19 PROCEDURE — 87340 HEPATITIS B SURFACE AG IA: CPT

## 2019-03-19 PROCEDURE — 86780 TREPONEMA PALLIDUM: CPT

## 2019-03-19 NOTE — NON-PROVIDER
TDap given to patient. L    Deltoid. Screening checklist reviewed with patient. VIS given. Verified by CW

## 2019-03-19 NOTE — PROGRESS NOTES
S) Pt is a 23 y.o.   at 27w4d  gestation. Routine prenatal care today. No complaints today. Just did blood work. Results pending. Discussed Rh factor and that she may need to come back in a few days for rhogam.  labor precautions discussed and all questions answered.    Fetal movement Normal  Cramping no  VB no  LOF no   Denies dysuria. Generally feels well today. Good self-care activities identified. Denies headaches, swelling, visual changes, or epigastric pain .     O) Blood pressure 110/70, weight 81.2 kg (179 lb), last menstrual period 2018, not currently breastfeeding.        Labs:       PNL: Pending       GCT: done today        AFP: Not Examined       GBS: N/A       Pertinent ultrasound -        19- Survey WNL, DARELL 18.37cm, c/w prev dating.    A) IUP at 27w4d       S=D         Patient Active Problem List    Diagnosis Date Noted   • Supervision of normal first pregnancy, antepartum 2019          SVE: deferred       Chaperone offered: n/a         TDAP: yes       FLU: yes        BTL: no       : n/a       C/S Consent: n/a       IOL or C/S scheduled: no       LAST PAP: 19- Negative         P) s/s ptl vs general discomforts. Fetal movements reviewed. General ed and anticipatory guidance. Nutrition/exercise/vitamin. Plans breast Plans pp contraception- unsure  Continue PNV.

## 2019-03-19 NOTE — TELEPHONE ENCOUNTER
Pt called states she wants to know if she can have her test done today before her appt.  Was advised to have it done today, she needs to avoid havinf carbohydrates and sugars.  Verbalized understanding.

## 2019-03-19 NOTE — PROGRESS NOTES
OB f/u. + fetal movement.  No VB, LOF or UC's.  Wt: 179lb      BP:110/70  Good phone # [phone]  Preferred pharmacy confirmed.  US done  TDap today  Kick count sheet given today.  PNP and 1 gtt done today

## 2019-03-19 NOTE — LETTER
"Count Your Baby's Movements  Another step to a healthy delivery    Gwendolyn Masters             Dept: [phone]    How Many Weeks Pregnant 27w4d    Date to Begin Counting: 3/22/19              How to use this chart    One way for your physician to keep track of your baby's health is by knowing how often the baby moves (or \"kicks\") in your womb.  You can help your physician to do this by using this chart every day.    Every day, you should see how many hours it takes for your baby to move 10 times.  Start in the morning, as soon as you get up.    · First, write down the time your baby moves until you get to 10.  · Check off one box every time your baby moves until you get to 10.  · Write down the time you finished counting in the last column.  · Total how long it took to count up all 10 movements.  · Finally, fill in the box that shows how long this took.  After counting 10 movements, you no longer have to count any more that day.  The next morning, just start counting again as soon as you get up.    What should you call a \"movement\"?  It is hard to say, because it will feel different from one mother to another and from one pregnancy to the next.  The important thing is that you count the movements the same way throughout your pregnancy.  If you have more questions, you should ask your physician.    Count carefully every day!  SAMPLE:  Week 28    How many hours did it take to feel 10 movements?       Start  Time     1     2     3     4     5     6     7     8     9     10   Finish Time   Mon 8:20 ·  ·  ·  ·  ·  ·  ·  ·  ·  ·  11:40   Tue Wed Thu Fri               Sat               Sun                 IMPORTANT: You should contact your physician if it takes more than two hours for you to feel 10 movements.  Each morning, write down the time and start to count the movements of your baby.  Keep track by checking off one box every time you feel one movement.  When you have felt " "10 \"kicks\", write down the time you finished counting in the last column.  Then fill in the   box (over the check pipo) for the number of hours it took.  Be sure to read the complete instructions on the previous page.            "

## 2019-03-28 ENCOUNTER — TELEPHONE (OUTPATIENT)
Dept: OBGYN | Facility: CLINIC | Age: 24
End: 2019-03-28

## 2019-03-28 DIAGNOSIS — Z3A.28 28 WEEKS GESTATION OF PREGNANCY: ICD-10-CM

## 2019-03-28 NOTE — TELEPHONE ENCOUNTER
Called pt to see if she was having frequency or burning with urination. Pt stated she was a couple weeks ago but started to drink a lot of water and it went away. Consulted with Jaimie who advise pt go give another urine sample at the lab so we can get it cultured to see if there is a infection. Pt stated she will go tomorrow due to having to work today.   ----- Message from KAYLEE Banuelos sent at 3/21/2019  9:38 AM PDT -----  Please call pt and see if she is having sxs with urination. If she is she needs treatment, if not she needs to drop a urine ASAP for culture at lab. Let us know which is needed and we will order.

## 2019-04-11 ENCOUNTER — ROUTINE PRENATAL (OUTPATIENT)
Dept: OBGYN | Facility: CLINIC | Age: 24
End: 2019-04-11
Payer: MEDICAID

## 2019-04-11 VITALS — BODY MASS INDEX: 25.84 KG/M2 | SYSTOLIC BLOOD PRESSURE: 108 MMHG | DIASTOLIC BLOOD PRESSURE: 72 MMHG | WEIGHT: 175 LBS

## 2019-04-11 DIAGNOSIS — O23.43 URINARY TRACT INFECTION IN MOTHER DURING THIRD TRIMESTER OF PREGNANCY: ICD-10-CM

## 2019-04-11 PROBLEM — O23.40 UTI IN PREGNANCY: Status: ACTIVE | Noted: 2019-04-11

## 2019-04-11 LAB
APPEARANCE UR: NORMAL
BILIRUB UR STRIP-MCNC: NORMAL MG/DL
COLOR UR AUTO: NORMAL
GLUCOSE UR STRIP.AUTO-MCNC: NEGATIVE MG/DL
KETONES UR STRIP.AUTO-MCNC: NEGATIVE MG/DL
LEUKOCYTE ESTERASE UR QL STRIP.AUTO: NEGATIVE
NITRITE UR QL STRIP.AUTO: POSITIVE
PH UR STRIP.AUTO: 6 [PH] (ref 5–8)
PROT UR QL STRIP: NEGATIVE MG/DL
RBC UR QL AUTO: NEGATIVE
SP GR UR STRIP.AUTO: 1.01
UROBILINOGEN UR STRIP-MCNC: NORMAL MG/DL

## 2019-04-11 PROCEDURE — 90040 PR PRENATAL FOLLOW UP: CPT | Performed by: NURSE PRACTITIONER

## 2019-04-11 PROCEDURE — 81002 URINALYSIS NONAUTO W/O SCOPE: CPT | Performed by: NURSE PRACTITIONER

## 2019-04-11 RX ORDER — NITROFURANTOIN 25; 75 MG/1; MG/1
100 CAPSULE ORAL 2 TIMES DAILY
Qty: 14 CAP | Refills: 0 | Status: SHIPPED | OUTPATIENT
Start: 2019-04-11 | End: 2019-04-18

## 2019-04-11 RX ORDER — DOCUSATE SODIUM 100 MG/1
100 CAPSULE, LIQUID FILLED ORAL 2 TIMES DAILY
Qty: 30 CAP | Refills: 0 | Status: ON HOLD | OUTPATIENT
Start: 2019-04-11 | End: 2019-06-14

## 2019-04-11 RX ORDER — NITROFURANTOIN 25; 75 MG/1; MG/1
100 CAPSULE ORAL 2 TIMES DAILY
Qty: 14 CAP | Refills: 0 | Status: SHIPPED | OUTPATIENT
Start: 2019-04-11 | End: 2019-04-30 | Stop reason: SDUPTHER

## 2019-04-11 NOTE — PROGRESS NOTES
SUBJECTIVE:  Pt is a 23 y.o.   at 30w6d  gestation. Presents today for follow-up prenatal care. Reports no issues at this time.  Reports good  fetal movement. Denies cramping/contractions, bleeding or leaking of fluid. Denies dysuria, headaches, N/V, or other issues at this time. Generally feels well today. Reports having rough sex and feeling like she tore up near her clitoris. Also reports one hard bowel movement yesterday that caused her to bleed into toilet paper and in toilet. No bleeding since, first time this happened and small bowel movement today without blood.     OBJECTIVE:  - See prenatal vitals flow  -   Vitals:    19 1031   BP: 108/72   Weight: 79.4 kg (175 lb)                 ASSESSMENT:   - IUP at 30w6d    - S=D   - Clitoral area intact, one small hemorrhoid   Patient Active Problem List    Diagnosis Date Noted   • Supervision of normal first pregnancy, antepartum 2019         PLAN:  - S/sx pregnancy and labor warning signs vs general discomforts discussed  - Fetal movements and kick counts reviewed   - Adequate hydration reinforced  - Nutrition/exercise/vitamin education; continued PNV  - Constipation remedies reviewed and to call with any increase or continued bleeding   - S/p TDAP vacc   - S/p Flu vacc   - Asymptomatic but nitrates positive today; macorbid to pharm   - Anticipatory guidance given  - RTC in 2 weeks for follow-up prenatal care

## 2019-04-11 NOTE — TELEPHONE ENCOUNTER
Pt called requesting to transfer Rx's to [facility] on [address] due to insurance not covering [facility]

## 2019-04-11 NOTE — PROGRESS NOTES
OB f/u. + fetal movement.  No VB, LOF or UC's.  Wt: 175lb      BP:108/72  Good phone # [phone]  Preferred pharmacy confirmed.  Wants to get check, had rough sex, thinks she tear on vaginal area. Also  A lot of blood in stool yesterday. Occasional constipation

## 2019-04-25 ENCOUNTER — HOSPITAL ENCOUNTER (OUTPATIENT)
Dept: LAB | Facility: MEDICAL CENTER | Age: 24
End: 2019-04-25
Attending: NURSE PRACTITIONER
Payer: MEDICAID

## 2019-04-25 ENCOUNTER — ROUTINE PRENATAL (OUTPATIENT)
Dept: OBGYN | Facility: CLINIC | Age: 24
End: 2019-04-25
Payer: MEDICAID

## 2019-04-25 VITALS — DIASTOLIC BLOOD PRESSURE: 70 MMHG | WEIGHT: 174 LBS | BODY MASS INDEX: 25.7 KG/M2 | SYSTOLIC BLOOD PRESSURE: 112 MMHG

## 2019-04-25 DIAGNOSIS — R30.0 DYSURIA: ICD-10-CM

## 2019-04-25 LAB
APPEARANCE UR: ABNORMAL
APPEARANCE UR: NORMAL
BACTERIA #/AREA URNS HPF: ABNORMAL /HPF
BILIRUB UR QL STRIP.AUTO: NEGATIVE
BILIRUB UR STRIP-MCNC: NORMAL MG/DL
COLOR UR AUTO: YELLOW
COLOR UR: ABNORMAL
EPI CELLS #/AREA URNS HPF: ABNORMAL /HPF
GLUCOSE UR STRIP.AUTO-MCNC: NEGATIVE MG/DL
GLUCOSE UR STRIP.AUTO-MCNC: NEGATIVE MG/DL
KETONES UR STRIP.AUTO-MCNC: ABNORMAL MG/DL
KETONES UR STRIP.AUTO-MCNC: NEGATIVE MG/DL
LEUKOCYTE ESTERASE UR QL STRIP.AUTO: ABNORMAL
LEUKOCYTE ESTERASE UR QL STRIP.AUTO: NORMAL
MICRO URNS: ABNORMAL
NITRITE UR QL STRIP.AUTO: POSITIVE
NITRITE UR QL STRIP.AUTO: POSITIVE
PH UR STRIP.AUTO: 6 [PH]
PH UR STRIP.AUTO: 7 [PH] (ref 5–8)
PROT UR QL STRIP: NEGATIVE MG/DL
PROT UR QL STRIP: NEGATIVE MG/DL
RBC # URNS HPF: ABNORMAL /HPF
RBC UR QL AUTO: NEGATIVE
RBC UR QL AUTO: NORMAL
SP GR UR STRIP.AUTO: 1.01
SP GR UR STRIP.AUTO: 1.02
UROBILINOGEN UR STRIP-MCNC: NORMAL MG/DL
UROBILINOGEN UR STRIP.AUTO-MCNC: 0.2 MG/DL
WBC #/AREA URNS HPF: ABNORMAL /HPF

## 2019-04-25 PROCEDURE — 90040 PR PRENATAL FOLLOW UP: CPT | Performed by: NURSE PRACTITIONER

## 2019-04-25 PROCEDURE — 81001 URINALYSIS AUTO W/SCOPE: CPT

## 2019-04-25 PROCEDURE — 81002 URINALYSIS NONAUTO W/O SCOPE: CPT | Performed by: NURSE PRACTITIONER

## 2019-04-25 NOTE — PROGRESS NOTES
Pt here today for OB follow up  Reports +FM  WT: 174 lb  BP: 112/70  Pt states she was seen at [facility] about 2 weeks ago due to a respiratory infection.   Pt states no complaints or concerns today  Good # [phone]

## 2019-04-25 NOTE — PROGRESS NOTES
SUBJECTIVE:  Pt is a 23 y.o.   at 32w6d  gestation. Presents today for follow-up prenatal care. Reports no issues at this time.  Reports good fetal movement. Denies cramping/contractions, bleeding or leaking of fluid. Denies dysuria, headaches, N/V, or other issues at this time. Generally feels well today. Was seen at [facility] for respiratory infection but has since improved and feels stable now. Took full course of anbx for UTI. Denies any symptoms still.     OBJECTIVE:  - See prenatal vitals flow  -   Vitals:    19 1501   BP: 112/70   Weight: 78.9 kg (174 lb)                 ASSESSMENT:   - IUP at 32w6d    - S=D   -   Patient Active Problem List    Diagnosis Date Noted   • UTI in pregnancy 2019   • Supervision of normal first pregnancy, antepartum 2019         PLAN:  - S/sx pregnancy and labor warning signs vs general discomforts discussed  - Fetal movements and/or kick counts reviewed   - Adequate hydration reinforced  - Nutrition/exercise/vitamin education; continue PNV  - Urine dip today still positive nitrites: for culture now  - S/p TDAP vacc   - S/p Flu vacc   - Anticipatory guidance given  - RTC in 2 weeks for follow-up prenatal care

## 2019-04-30 ENCOUNTER — TELEPHONE (OUTPATIENT)
Dept: OBGYN | Facility: CLINIC | Age: 24
End: 2019-04-30

## 2019-04-30 DIAGNOSIS — O23.43 URINARY TRACT INFECTION IN MOTHER DURING THIRD TRIMESTER OF PREGNANCY: ICD-10-CM

## 2019-04-30 RX ORDER — NITROFURANTOIN 25; 75 MG/1; MG/1
100 CAPSULE ORAL 2 TIMES DAILY
Qty: 14 CAP | Refills: 0 | Status: ON HOLD | OUTPATIENT
Start: 2019-04-30 | End: 2019-06-14

## 2019-05-05 ENCOUNTER — HOSPITAL ENCOUNTER (OUTPATIENT)
Facility: MEDICAL CENTER | Age: 24
End: 2019-05-05
Attending: OBSTETRICS & GYNECOLOGY | Admitting: OBSTETRICS & GYNECOLOGY
Payer: MEDICAID

## 2019-05-05 LAB
APPEARANCE UR: CLEAR
COLOR UR AUTO: ABNORMAL
GLUCOSE UR QL STRIP.AUTO: NEGATIVE MG/DL
KETONES UR QL STRIP.AUTO: NEGATIVE MG/DL
LEUKOCYTE ESTERASE UR QL STRIP.AUTO: NEGATIVE
NITRITE UR QL STRIP.AUTO: NEGATIVE
PH UR STRIP.AUTO: 6 [PH]
PROT UR QL STRIP: NEGATIVE MG/DL
RBC UR QL AUTO: NEGATIVE
SP GR UR: 1.02

## 2019-05-05 PROCEDURE — 81002 URINALYSIS NONAUTO W/O SCOPE: CPT

## 2019-05-05 PROCEDURE — 59025 FETAL NON-STRESS TEST: CPT

## 2019-05-05 NOTE — PROGRESS NOTES
23 y.o.    Edc=  34.2 weeks    TOCO and US on.  VSS.  Pt presents to triage with c/o cramping.  Pt denies VB or LOF and states pos FM.  SVE=1/thick.   1430-Reactive NST, no UCs seen.  Dr. Miller (resident) called and report given on above stated.  Dr. Miller in room to assess pt.  1445-Orders to dc pt home with labor precautions.

## 2019-05-05 NOTE — PROGRESS NOTES
[facility] LABOR AND DELIVERY TRIAGE PROGRESS NOTE    PATIENT ID:  NAME:  Gwendolyn Masters  MRN:               [id number]  YOB: 1995     23 y.o. female  at 34w2d.    Subjective: Pt c/o lower abdominal cramping and lower back pain starting at 1130 today. This is the first time she has felt this cramping pain and is concerned for early labor. She works on her feet as a .  Pregnancy uncomplicated    negative  For CTXS.   positive Feels pain   negative for LOF  negative for vaginal bleeding.   positive for fetal movement    ROS: Patient denies any fever chills, nausea, vomiting, headache, chest pain, shortness of breath, or dysuria or unusual swelling of hands or feet.     Objective:    HR: 90  BP: 128/77    General: No acute distress, resting comfortably in bed.  HEENT: normocephalic, nontraumatic, PERRLA, EOMI  Cardiovascular: Heart RRR with no murmurs, rubs or gallops. Distal Pulses 2+  Respiratory: symmetric chest expansion, lungs CTAB, with no wheezes, rales, rhonci  Abdomen: gravid, nontender  Musculoskeletal: strength 5/5 in four extremities  Neuro: non focal with no numbness, tingling or changes in sensation  Cervix:  1cm/thick/posterior  [place]: Uterine Contractions not noted  FHRM: Baseline 130, Accels to 150, no decels, moderate variability    UA: SG 1.025, otherwise wnl      Assessment: 23 y.o. female  at 34w2d with low abdominal pain and mild dehydration--no signs of early labor    Plan:   1. Discharge home with return precautions and instructions to follow up with [facility] next week as planned   2. Encouraged to drink more water/substitute water for soda/juice   3. Encouraged patient to take prn tylenol and use supportive management for pain (including using a stool instead of standing at work, stretching etc)      Discussed case with Dr. Kena Nelson, [facility] Attending. Case was discussed and attending agreed with plan prior to discharge of patient.

## 2019-05-09 ENCOUNTER — ROUTINE PRENATAL (OUTPATIENT)
Dept: OBGYN | Facility: CLINIC | Age: 24
End: 2019-05-09
Payer: MEDICAID

## 2019-05-09 VITALS — BODY MASS INDEX: 26.43 KG/M2 | WEIGHT: 179 LBS | DIASTOLIC BLOOD PRESSURE: 68 MMHG | SYSTOLIC BLOOD PRESSURE: 124 MMHG

## 2019-05-09 DIAGNOSIS — Z34.00 SUPERVISION OF NORMAL FIRST PREGNANCY, ANTEPARTUM: ICD-10-CM

## 2019-05-09 PROCEDURE — 90040 PR PRENATAL FOLLOW UP: CPT | Performed by: PHYSICIAN ASSISTANT

## 2019-05-09 NOTE — PROGRESS NOTES
Pt has no complaints with cramping, UCs, Vb, LOF, though pt has had some UCs over past week, so was seen in L&D but sent home. Per pt, already dilated to 1cm. Labor precautions reviewed. +FM. GBS next visit. Pt works on feet, will work until delivery. Daily FKC and walks recommended. RTC 1 wk or sooner prn.

## 2019-05-09 NOTE — PROGRESS NOTES
Pt here today for OB follow up  Pt states no complaints   Reports +  Good # [phone]  Pharmacy Confirmed.

## 2019-05-14 ENCOUNTER — ROUTINE PRENATAL (OUTPATIENT)
Dept: OBGYN | Facility: CLINIC | Age: 24
End: 2019-05-14
Payer: MEDICAID

## 2019-05-14 ENCOUNTER — HOSPITAL ENCOUNTER (OUTPATIENT)
Facility: MEDICAL CENTER | Age: 24
End: 2019-05-14
Attending: PHYSICIAN ASSISTANT
Payer: MEDICAID

## 2019-05-14 VITALS — WEIGHT: 179 LBS | DIASTOLIC BLOOD PRESSURE: 68 MMHG | SYSTOLIC BLOOD PRESSURE: 102 MMHG | BODY MASS INDEX: 26.43 KG/M2

## 2019-05-14 DIAGNOSIS — Z34.00 SUPERVISION OF NORMAL FIRST PREGNANCY, ANTEPARTUM: Primary | ICD-10-CM

## 2019-05-14 PROBLEM — O23.40 UTI IN PREGNANCY: Status: RESOLVED | Noted: 2019-04-11 | Resolved: 2019-05-14

## 2019-05-14 PROCEDURE — 87081 CULTURE SCREEN ONLY: CPT

## 2019-05-14 PROCEDURE — 90040 PR PRENATAL FOLLOW UP: CPT | Performed by: PHYSICIAN ASSISTANT

## 2019-05-14 PROCEDURE — 87150 DNA/RNA AMPLIFIED PROBE: CPT

## 2019-05-14 ASSESSMENT — EDINBURGH POSTNATAL DEPRESSION SCALE (EPDS)
I HAVE BEEN ABLE TO LAUGH AND SEE THE FUNNY SIDE OF THINGS: AS MUCH AS I ALWAYS COULD
THINGS HAVE BEEN GETTING ON TOP OF ME: NO, MOST OF THE TIME I HAVE COPED QUITE WELL
I HAVE FELT SCARED OR PANICKY FOR NO GOOD REASON: NO, NOT MUCH
I HAVE FELT SAD OR MISERABLE: NOT VERY OFTEN
I HAVE BEEN SO UNHAPPY THAT I HAVE HAD DIFFICULTY SLEEPING: NOT AT ALL
I HAVE BEEN ANXIOUS OR WORRIED FOR NO GOOD REASON: YES, VERY OFTEN
TOTAL SCORE: 7
I HAVE BLAMED MYSELF UNNECESSARILY WHEN THINGS WENT WRONG: NOT VERY OFTEN
THE THOUGHT OF HARMING MYSELF HAS OCCURRED TO ME: NEVER
I HAVE LOOKED FORWARD WITH ENJOYMENT TO THINGS: AS MUCH AS I EVER DID
I HAVE BEEN SO UNHAPPY THAT I HAVE BEEN CRYING: NO, NEVER

## 2019-05-14 NOTE — PROGRESS NOTES
Pt has no complaints with cramping, UCs, Vb, LOF, though pt has had occ tightening of abd only while at work. +FM. GBS done today. Labor precautions reviewed in detail. Daily FKC and walks recommended. RTC 1 wk or sooner prn.

## 2019-05-14 NOTE — PROGRESS NOTES
Pt. here for Ob f/u and GBS today. Good # [phone]  Good FM  Pt states having contractions that come and go.   Pharmacy verified.   Chaperone offered not needed.

## 2019-05-15 DIAGNOSIS — Z34.00 SUPERVISION OF NORMAL FIRST PREGNANCY, ANTEPARTUM: ICD-10-CM

## 2019-05-16 PROBLEM — O99.820 GBS (GROUP B STREPTOCOCCUS CARRIER), +RV CULTURE, CURRENTLY PREGNANT: Status: ACTIVE | Noted: 2019-05-16

## 2019-05-16 LAB — GP B STREP DNA SPEC QL NAA+PROBE: POSITIVE

## 2019-05-17 ENCOUNTER — TELEPHONE (OUTPATIENT)
Dept: OBGYN | Facility: CLINIC | Age: 24
End: 2019-05-17

## 2019-05-17 NOTE — TELEPHONE ENCOUNTER
Pt called triage line stating since last night has been having sharp shooting pains in stomach area, upper back and nausea states having + FM. Pt states hasn't had a bowel movement in 4-5 days, informed pt of  constipation handout remedies, instructed pt that if  symptoms get worse she needs to go to [facility] L&D for further evaluation per consult with Madonna Shen C.N.M. Pt verbalized understanding and will comply pt had no further questions or concerns.

## 2019-05-28 ENCOUNTER — TELEPHONE (OUTPATIENT)
Dept: OBGYN | Facility: CLINIC | Age: 24
End: 2019-05-28

## 2019-05-28 NOTE — TELEPHONE ENCOUNTER
Pt called to reschedule appt she has scheduled for 5/31/19, states has missed last 2 appts because her employer won't give her the time off. Call transferred to [facility] to reschedule.

## 2019-05-31 ENCOUNTER — HOSPITAL ENCOUNTER (OUTPATIENT)
Facility: MEDICAL CENTER | Age: 24
End: 2019-05-31
Attending: OBSTETRICS & GYNECOLOGY | Admitting: OBSTETRICS & GYNECOLOGY
Payer: MEDICAID

## 2019-05-31 VITALS
RESPIRATION RATE: 20 BRPM | TEMPERATURE: 98.2 F | SYSTOLIC BLOOD PRESSURE: 119 MMHG | HEIGHT: 70 IN | DIASTOLIC BLOOD PRESSURE: 82 MMHG | HEART RATE: 86 BPM | BODY MASS INDEX: 25.62 KG/M2 | WEIGHT: 179 LBS

## 2019-05-31 PROCEDURE — 59025 FETAL NON-STRESS TEST: CPT

## 2019-05-31 PROCEDURE — 59025 FETAL NON-STRESS TEST: CPT | Mod: 26 | Performed by: NURSE PRACTITIONER

## 2019-06-01 NOTE — PROGRESS NOTES
"S: Pt is a 23 y.o.  at 38w0d with Estimated Date of Delivery: 19 who presents to triage c/o UC's that started at 1700.  Denies VB or LOF.  Reports good FM.      O: /82   Pulse 86   Temp 36.8 °C (98.2 °F) (Oral)   Resp 20   Ht 1.778 m (5' 10\")   Wt 81.2 kg (179 lb)   LMP 2018 (Approximate)   BMI 25.68 kg/m²          NST: Done and read by me         Indication: Term IUP, labor evaluation       FHR: 135       Variability: moderate       Acclerations: positive       Decelerations: negative       Reactive: yes, category 1         [place]: Irregular UCs       SVE: /-2 posterior, per RN         A/P  Patient Active Problem List    Diagnosis Date Noted   • GBS (group B Streptococcus carrier), +RV culture, currently pregnant 2019   • Supervision of normal first pregnancy, antepartum 2019       1.  IUP @ 38w0d  2.  Reactive, category 1 NST.  3.  Probable early labor  4.  F/u TPC at next schedule appt on Monday.    Madonna Shen CNM, APRN      "

## 2019-06-01 NOTE — PROGRESS NOTES
1900 - Report received from Sandra MCCARTY. Elisabet ROSAS at bedside. Pt has not felt any UCs since being here, none picked up on toco. Reactive NST. Pt feels safe to discharge home. Discharge paperwork gone over with pt, pt verbalizes understanding. Pt has follow up appointment on Monday with TPC. Pt dressed and left unit with ride home in taxi.

## 2019-06-01 NOTE — PROGRESS NOTES
, JETT: , 38 weeks  Pt presents to L&D with c/o of painful UCs that started about 1 hour ago while at work.  Pt is moaning and appears in active labor. Pt denies LOF, VB, +FM. EFM/[place] placed. VSS. SVE by RN, /-1, posterior.   : Report given to ESPERANZA Bales RN. POC discussed.

## 2019-06-13 ENCOUNTER — ROUTINE PRENATAL (OUTPATIENT)
Dept: OBGYN | Facility: CLINIC | Age: 24
End: 2019-06-13
Payer: MEDICAID

## 2019-06-13 VITALS — WEIGHT: 184 LBS | BODY MASS INDEX: 26.4 KG/M2 | DIASTOLIC BLOOD PRESSURE: 62 MMHG | SYSTOLIC BLOOD PRESSURE: 102 MMHG

## 2019-06-13 DIAGNOSIS — Z34.03 ENCOUNTER FOR SUPERVISION OF NORMAL FIRST PREGNANCY IN THIRD TRIMESTER: ICD-10-CM

## 2019-06-13 PROCEDURE — 90040 PR PRENATAL FOLLOW UP: CPT | Performed by: OBSTETRICS & GYNECOLOGY

## 2019-06-13 NOTE — PROGRESS NOTES
OB F/U  Denies VB, LOF  +FM  Phone#: [phone]  Pharmacy Confirmed.  C/O: 's QHS, pt would like to be checked

## 2019-06-13 NOTE — PROGRESS NOTES
S: Pt presents for routine OB follow up.  No contractions, vaginal bleeding, or leaking fluids. Good fetal movement.  Desires a vaginal exam today Because she has been having irregular but painful contractions for the entire week.  She was seen in triage and told she was only 3 cm and sent home.    Patient Active Problem List   Diagnosis   • Supervision of normal first pregnancy, antepartum   • GBS (group B Streptococcus carrier), +RV culture, currently pregnant       O: /62   Wt 83.5 kg (184 lb)   LMP 2018 (Approximate)   BMI 26.40 kg/m²    Patients' weight gain, fluid intake and exercise level discussed.  Vitals, fundal height , fetal position, and FHR reviewed on flowsheet    SVE: 3 / 70 / -3   FH: 150  Fundus: 37    Lab:No results found for this or any previous visit (from the past 336 hour(s)).    Prenatal labs:  T&S: A+/ ab neg  Hep B: neg  T.Pall: non reactive  Rubella: immune  HIV: neg  Pap smear: NILM  GBS: positive  1 hour: 102     Level II: normal anatomy    A/P:  23 y.o.  at 39w6d based on first TM US in the ED presents for routine obstetric follow-up.   - membranes swept today- patient advised she would have spotting and cramping afterward   - Delivery plan: IOL ordered today for post dates  - Continue prenatal vitamins.  - Labor precautions and fetal kick counts   - Follow-up in 1 weeks.

## 2019-06-14 ENCOUNTER — HOSPITAL ENCOUNTER (OUTPATIENT)
Facility: MEDICAL CENTER | Age: 24
End: 2019-06-14
Attending: OBSTETRICS & GYNECOLOGY | Admitting: OBSTETRICS & GYNECOLOGY
Payer: MEDICAID

## 2019-06-14 VITALS
TEMPERATURE: 97.1 F | HEIGHT: 70 IN | HEART RATE: 91 BPM | RESPIRATION RATE: 20 BRPM | WEIGHT: 185 LBS | SYSTOLIC BLOOD PRESSURE: 120 MMHG | BODY MASS INDEX: 26.48 KG/M2 | DIASTOLIC BLOOD PRESSURE: 75 MMHG

## 2019-06-14 PROCEDURE — 700102 HCHG RX REV CODE 250 W/ 637 OVERRIDE(OP): Performed by: NURSE PRACTITIONER

## 2019-06-14 PROCEDURE — A9270 NON-COVERED ITEM OR SERVICE: HCPCS | Performed by: NURSE PRACTITIONER

## 2019-06-14 PROCEDURE — 59025 FETAL NON-STRESS TEST: CPT

## 2019-06-14 RX ORDER — ZOLPIDEM TARTRATE 5 MG/1
10 TABLET ORAL NIGHTLY PRN
Status: DISCONTINUED | OUTPATIENT
Start: 2019-06-14 | End: 2019-06-15 | Stop reason: HOSPADM

## 2019-06-14 RX ADMIN — ZOLPIDEM TARTRATE 10 MG: 5 TABLET ORAL at 23:46

## 2019-06-15 ENCOUNTER — ANESTHESIA EVENT (OUTPATIENT)
Dept: OBGYN | Facility: MEDICAL CENTER | Age: 24
End: 2019-06-15
Payer: MEDICAID

## 2019-06-15 ENCOUNTER — ANESTHESIA (OUTPATIENT)
Dept: OBGYN | Facility: MEDICAL CENTER | Age: 24
End: 2019-06-15
Payer: MEDICAID

## 2019-06-15 ENCOUNTER — HOSPITAL ENCOUNTER (OUTPATIENT)
Facility: MEDICAL CENTER | Age: 24
End: 2019-06-15
Attending: OBSTETRICS & GYNECOLOGY | Admitting: OBSTETRICS & GYNECOLOGY
Payer: MEDICAID

## 2019-06-15 ENCOUNTER — HOSPITAL ENCOUNTER (INPATIENT)
Facility: MEDICAL CENTER | Age: 24
LOS: 2 days | End: 2019-06-17
Attending: OBSTETRICS & GYNECOLOGY | Admitting: OBSTETRICS & GYNECOLOGY
Payer: MEDICAID

## 2019-06-15 VITALS
DIASTOLIC BLOOD PRESSURE: 78 MMHG | HEART RATE: 90 BPM | BODY MASS INDEX: 26.48 KG/M2 | SYSTOLIC BLOOD PRESSURE: 124 MMHG | WEIGHT: 185 LBS | HEIGHT: 70 IN | TEMPERATURE: 96.6 F

## 2019-06-15 LAB
BASOPHILS # BLD AUTO: 0.3 % (ref 0–1.8)
BASOPHILS # BLD: 0.04 K/UL (ref 0–0.12)
EOSINOPHIL # BLD AUTO: 0.03 K/UL (ref 0–0.51)
EOSINOPHIL NFR BLD: 0.2 % (ref 0–6.9)
ERYTHROCYTE [DISTWIDTH] IN BLOOD BY AUTOMATED COUNT: 46 FL (ref 35.9–50)
HCT VFR BLD AUTO: 34.7 % (ref 37–47)
HGB BLD-MCNC: 11.2 G/DL (ref 12–16)
HOLDING TUBE BB 8507: NORMAL
IMM GRANULOCYTES # BLD AUTO: 0.05 K/UL (ref 0–0.11)
IMM GRANULOCYTES NFR BLD AUTO: 0.4 % (ref 0–0.9)
LYMPHOCYTES # BLD AUTO: 1.65 K/UL (ref 1–4.8)
LYMPHOCYTES NFR BLD: 11.6 % (ref 22–41)
MCH RBC QN AUTO: 30.2 PG (ref 27–33)
MCHC RBC AUTO-ENTMCNC: 32.3 G/DL (ref 33.6–35)
MCV RBC AUTO: 93.5 FL (ref 81.4–97.8)
MONOCYTES # BLD AUTO: 1.01 K/UL (ref 0–0.85)
MONOCYTES NFR BLD AUTO: 7.1 % (ref 0–13.4)
NEUTROPHILS # BLD AUTO: 11.39 K/UL (ref 2–7.15)
NEUTROPHILS NFR BLD: 80.4 % (ref 44–72)
NRBC # BLD AUTO: 0 K/UL
NRBC BLD-RTO: 0 /100 WBC
PLATELET # BLD AUTO: 180 K/UL (ref 164–446)
PMV BLD AUTO: 12.2 FL (ref 9–12.9)
RBC # BLD AUTO: 3.71 M/UL (ref 4.2–5.4)
WBC # BLD AUTO: 14.2 K/UL (ref 4.8–10.8)

## 2019-06-15 PROCEDURE — 59409 OBSTETRICAL CARE: CPT

## 2019-06-15 PROCEDURE — 700111 HCHG RX REV CODE 636 W/ 250 OVERRIDE (IP)

## 2019-06-15 PROCEDURE — 10907ZC DRAINAGE OF AMNIOTIC FLUID, THERAPEUTIC FROM PRODUCTS OF CONCEPTION, VIA NATURAL OR ARTIFICIAL OPENING: ICD-10-PCS | Performed by: OBSTETRICS & GYNECOLOGY

## 2019-06-15 PROCEDURE — 303615 HCHG EPIDURAL/SPINAL ANESTHESIA FOR LABOR

## 2019-06-15 PROCEDURE — 85025 COMPLETE CBC W/AUTO DIFF WBC: CPT

## 2019-06-15 PROCEDURE — 770002 HCHG ROOM/CARE - OB PRIVATE (112)

## 2019-06-15 PROCEDURE — 304965 HCHG RECOVERY SERVICES

## 2019-06-15 PROCEDURE — 700105 HCHG RX REV CODE 258: Performed by: OBSTETRICS & GYNECOLOGY

## 2019-06-15 PROCEDURE — 700111 HCHG RX REV CODE 636 W/ 250 OVERRIDE (IP): Performed by: STUDENT IN AN ORGANIZED HEALTH CARE EDUCATION/TRAINING PROGRAM

## 2019-06-15 PROCEDURE — 700111 HCHG RX REV CODE 636 W/ 250 OVERRIDE (IP): Performed by: ANESTHESIOLOGY

## 2019-06-15 PROCEDURE — 59400 OBSTETRICAL CARE: CPT | Performed by: OBSTETRICS & GYNECOLOGY

## 2019-06-15 PROCEDURE — 0UQMXZZ REPAIR VULVA, EXTERNAL APPROACH: ICD-10-PCS | Performed by: OBSTETRICS & GYNECOLOGY

## 2019-06-15 PROCEDURE — 59025 FETAL NON-STRESS TEST: CPT

## 2019-06-15 PROCEDURE — 700102 HCHG RX REV CODE 250 W/ 637 OVERRIDE(OP): Performed by: OBSTETRICS & GYNECOLOGY

## 2019-06-15 PROCEDURE — A9270 NON-COVERED ITEM OR SERVICE: HCPCS | Performed by: OBSTETRICS & GYNECOLOGY

## 2019-06-15 PROCEDURE — 700105 HCHG RX REV CODE 258: Performed by: STUDENT IN AN ORGANIZED HEALTH CARE EDUCATION/TRAINING PROGRAM

## 2019-06-15 PROCEDURE — 36415 COLL VENOUS BLD VENIPUNCTURE: CPT

## 2019-06-15 PROCEDURE — 700111 HCHG RX REV CODE 636 W/ 250 OVERRIDE (IP): Performed by: OBSTETRICS & GYNECOLOGY

## 2019-06-15 RX ORDER — ACETAMINOPHEN 325 MG/1
325 TABLET ORAL EVERY 4 HOURS PRN
Status: DISCONTINUED | OUTPATIENT
Start: 2019-06-15 | End: 2019-06-17 | Stop reason: HOSPADM

## 2019-06-15 RX ORDER — METHYLERGONOVINE MALEATE 0.2 MG/ML
0.2 INJECTION INTRAVENOUS
Status: DISCONTINUED | OUTPATIENT
Start: 2019-06-15 | End: 2019-06-15

## 2019-06-15 RX ORDER — SODIUM CHLORIDE, SODIUM LACTATE, POTASSIUM CHLORIDE, CALCIUM CHLORIDE 600; 310; 30; 20 MG/100ML; MG/100ML; MG/100ML; MG/100ML
INJECTION, SOLUTION INTRAVENOUS CONTINUOUS
Status: DISPENSED | OUTPATIENT
Start: 2019-06-15 | End: 2019-06-15

## 2019-06-15 RX ORDER — HYDROXYZINE HYDROCHLORIDE 25 MG/ML
50 INJECTION, SOLUTION INTRAMUSCULAR ONCE
Status: DISCONTINUED | OUTPATIENT
Start: 2019-06-15 | End: 2019-06-16

## 2019-06-15 RX ORDER — ROPIVACAINE HYDROCHLORIDE 2 MG/ML
INJECTION, SOLUTION EPIDURAL; INFILTRATION; PERINEURAL CONTINUOUS
Status: DISCONTINUED | OUTPATIENT
Start: 2019-06-15 | End: 2019-06-15

## 2019-06-15 RX ORDER — SODIUM CHLORIDE, SODIUM LACTATE, POTASSIUM CHLORIDE, AND CALCIUM CHLORIDE .6; .31; .03; .02 G/100ML; G/100ML; G/100ML; G/100ML
250 INJECTION, SOLUTION INTRAVENOUS PRN
Status: DISCONTINUED | OUTPATIENT
Start: 2019-06-15 | End: 2019-06-15 | Stop reason: HOSPADM

## 2019-06-15 RX ORDER — OXYCODONE AND ACETAMINOPHEN 10; 325 MG/1; MG/1
1 TABLET ORAL EVERY 4 HOURS PRN
Status: DISCONTINUED | OUTPATIENT
Start: 2019-06-15 | End: 2019-06-17 | Stop reason: HOSPADM

## 2019-06-15 RX ORDER — OXYTOCIN 10 [USP'U]/ML
10 INJECTION, SOLUTION INTRAMUSCULAR; INTRAVENOUS
Status: DISCONTINUED | OUTPATIENT
Start: 2019-06-15 | End: 2019-06-15 | Stop reason: HOSPADM

## 2019-06-15 RX ORDER — HYDROXYZINE 50 MG/1
50 TABLET, FILM COATED ORAL EVERY 6 HOURS PRN
Status: DISCONTINUED | OUTPATIENT
Start: 2019-06-15 | End: 2019-06-15 | Stop reason: HOSPADM

## 2019-06-15 RX ORDER — OXYCODONE HYDROCHLORIDE AND ACETAMINOPHEN 5; 325 MG/1; MG/1
1 TABLET ORAL EVERY 4 HOURS PRN
Status: DISCONTINUED | OUTPATIENT
Start: 2019-06-15 | End: 2019-06-17 | Stop reason: HOSPADM

## 2019-06-15 RX ORDER — IBUPROFEN 600 MG/1
600 TABLET ORAL EVERY 6 HOURS PRN
Status: CANCELLED | OUTPATIENT
Start: 2019-06-15

## 2019-06-15 RX ORDER — ALUMINA, MAGNESIA, AND SIMETHICONE 2400; 2400; 240 MG/30ML; MG/30ML; MG/30ML
30 SUSPENSION ORAL EVERY 6 HOURS PRN
Status: DISCONTINUED | OUTPATIENT
Start: 2019-06-15 | End: 2019-06-15 | Stop reason: HOSPADM

## 2019-06-15 RX ORDER — MISOPROSTOL 200 UG/1
800 TABLET ORAL
Status: DISCONTINUED | OUTPATIENT
Start: 2019-06-15 | End: 2019-06-15

## 2019-06-15 RX ORDER — SODIUM CHLORIDE, SODIUM LACTATE, POTASSIUM CHLORIDE, CALCIUM CHLORIDE 600; 310; 30; 20 MG/100ML; MG/100ML; MG/100ML; MG/100ML
INJECTION, SOLUTION INTRAVENOUS PRN
Status: DISCONTINUED | OUTPATIENT
Start: 2019-06-15 | End: 2019-06-17 | Stop reason: HOSPADM

## 2019-06-15 RX ORDER — SODIUM CHLORIDE, SODIUM LACTATE, POTASSIUM CHLORIDE, CALCIUM CHLORIDE 600; 310; 30; 20 MG/100ML; MG/100ML; MG/100ML; MG/100ML
INJECTION, SOLUTION INTRAVENOUS
Status: DISCONTINUED
Start: 2019-06-15 | End: 2019-06-15 | Stop reason: HOSPADM

## 2019-06-15 RX ORDER — ONDANSETRON 2 MG/ML
4 INJECTION INTRAMUSCULAR; INTRAVENOUS EVERY 6 HOURS PRN
Status: DISCONTINUED | OUTPATIENT
Start: 2019-06-15 | End: 2019-06-17 | Stop reason: HOSPADM

## 2019-06-15 RX ORDER — METHYLERGONOVINE MALEATE 0.2 MG/ML
0.2 INJECTION INTRAVENOUS
Status: DISCONTINUED | OUTPATIENT
Start: 2019-06-15 | End: 2019-06-15 | Stop reason: HOSPADM

## 2019-06-15 RX ORDER — CARBOPROST TROMETHAMINE 250 UG/ML
250 INJECTION, SOLUTION INTRAMUSCULAR
Status: DISCONTINUED | OUTPATIENT
Start: 2019-06-15 | End: 2019-06-15 | Stop reason: HOSPADM

## 2019-06-15 RX ORDER — IBUPROFEN 800 MG/1
800 TABLET ORAL EVERY 8 HOURS PRN
Status: DISCONTINUED | OUTPATIENT
Start: 2019-06-15 | End: 2019-06-17 | Stop reason: HOSPADM

## 2019-06-15 RX ORDER — SODIUM CHLORIDE, SODIUM LACTATE, POTASSIUM CHLORIDE, AND CALCIUM CHLORIDE .6; .31; .03; .02 G/100ML; G/100ML; G/100ML; G/100ML
1000 INJECTION, SOLUTION INTRAVENOUS
Status: DISCONTINUED | OUTPATIENT
Start: 2019-06-15 | End: 2019-06-15 | Stop reason: HOSPADM

## 2019-06-15 RX ORDER — MISOPROSTOL 200 UG/1
600 TABLET ORAL
Status: DISCONTINUED | OUTPATIENT
Start: 2019-06-15 | End: 2019-06-17 | Stop reason: HOSPADM

## 2019-06-15 RX ORDER — ONDANSETRON 4 MG/1
4 TABLET, ORALLY DISINTEGRATING ORAL EVERY 6 HOURS PRN
Status: DISCONTINUED | OUTPATIENT
Start: 2019-06-15 | End: 2019-06-17 | Stop reason: HOSPADM

## 2019-06-15 RX ORDER — ROPIVACAINE HYDROCHLORIDE 2 MG/ML
INJECTION, SOLUTION EPIDURAL; INFILTRATION; PERINEURAL
Status: COMPLETED
Start: 2019-06-15 | End: 2019-06-15

## 2019-06-15 RX ORDER — METHYLERGONOVINE MALEATE 0.2 MG/ML
0.2 INJECTION INTRAVENOUS
Status: DISCONTINUED | OUTPATIENT
Start: 2019-06-15 | End: 2019-06-17 | Stop reason: HOSPADM

## 2019-06-15 RX ORDER — IBUPROFEN 800 MG/1
800 TABLET ORAL EVERY 6 HOURS PRN
Status: DISCONTINUED | OUTPATIENT
Start: 2019-06-15 | End: 2019-06-17 | Stop reason: HOSPADM

## 2019-06-15 RX ORDER — MISOPROSTOL 200 UG/1
800 TABLET ORAL
Status: DISCONTINUED | OUTPATIENT
Start: 2019-06-15 | End: 2019-06-15 | Stop reason: HOSPADM

## 2019-06-15 RX ADMIN — SODIUM CHLORIDE 5 MILLION UNITS: 900 INJECTION INTRAVENOUS at 12:30

## 2019-06-15 RX ADMIN — SODIUM CHLORIDE 2.5 MILLION UNITS: 9 INJECTION, SOLUTION INTRAVENOUS at 17:01

## 2019-06-15 RX ADMIN — ROPIVACAINE HYDROCHLORIDE: 2 INJECTION, SOLUTION EPIDURAL; INFILTRATION at 11:35

## 2019-06-15 RX ADMIN — OXYCODONE HYDROCHLORIDE AND ACETAMINOPHEN 1 TABLET: 5; 325 TABLET ORAL at 22:20

## 2019-06-15 RX ADMIN — Medication 2 MILLI-UNITS/MIN: at 12:00

## 2019-06-15 RX ADMIN — IBUPROFEN 800 MG: 800 TABLET ORAL at 19:21

## 2019-06-15 RX ADMIN — BUPIVACAINE HYDROCHLORIDE 10 ML: 2.5 INJECTION, SOLUTION EPIDURAL; INFILTRATION; INTRACAUDAL; PERINEURAL at 11:20

## 2019-06-15 RX ADMIN — FENTANYL CITRATE 50 MCG: 50 INJECTION INTRAMUSCULAR; INTRAVENOUS at 09:56

## 2019-06-15 RX ADMIN — Medication 125 ML/HR: at 19:21

## 2019-06-15 RX ADMIN — ROPIVACAINE HYDROCHLORIDE: 2 INJECTION, SOLUTION EPIDURAL; INFILTRATION; PERINEURAL at 11:35

## 2019-06-15 RX ADMIN — SODIUM CHLORIDE, POTASSIUM CHLORIDE, SODIUM LACTATE AND CALCIUM CHLORIDE: 600; 310; 30; 20 INJECTION, SOLUTION INTRAVENOUS at 12:33

## 2019-06-15 RX ADMIN — Medication 2000 ML/HR: at 18:00

## 2019-06-15 RX ADMIN — SODIUM CHLORIDE 2.5 MILLION UNITS: 9 INJECTION, SOLUTION INTRAVENOUS at 13:25

## 2019-06-15 RX ADMIN — SODIUM CHLORIDE, POTASSIUM CHLORIDE, SODIUM LACTATE AND CALCIUM CHLORIDE: 600; 310; 30; 20 INJECTION, SOLUTION INTRAVENOUS at 11:39

## 2019-06-15 ASSESSMENT — LIFESTYLE VARIABLES
ALCOHOL_USE: NO
EVER_SMOKED: NEVER

## 2019-06-15 ASSESSMENT — PAIN SCALES - GENERAL: PAIN_LEVEL: 0

## 2019-06-15 NOTE — PROGRESS NOTES
JETT   GA 40w1d   With UCs that have gotter stronger and more frequent since she was last here. She alos c/o mild leaking since 0200. Denies VB and reports +FM.     0600- TOCO EFm Applied. Spoke with Dr. Khanna. Orders Sterile speck done. No pooling noted. SVE 3/850/-1(JONA Chavarria RN) . 0 Station during   0610- Call to Cara rome. Orders to give 50 Visteril IM and discharge home . Pt took Uber here. Told her to call family member to take her home. Medication can heavily sedate and would rather her be with a family or friend than a stranger.   0640- spoke with Cara. Decision to wlak pt for an hour to assess for cervial change. Will recheck in 1 hour. If unchanged, pt understands she will get the IM medication and be discharged home more than likely.   0700- Report given to Day shift RN.

## 2019-06-15 NOTE — PROGRESS NOTES
Labor Check    S: Pt examined. Comfortable. Pitocin at 8mu    O:  Gen: AAO in NAD  Abd: gravid, nontender to palpation  SVE:  / -1 bulging bag. arom clear fluids  Ext: nontender bl, nonedematous    FHT: 135 / mod marvin / non reactive with positive scalp stim  Tillar: irregularly q 2min    A/P:  23 y.o.  at 40w1d here in active labor  - epidural in place  - pitocin at 8mu. Increase per protocol  - await delivery

## 2019-06-15 NOTE — ANESTHESIA PROCEDURE NOTES
Epidural Block  Performed by: RANI LOPEZ  Authorized by: RANI LOPEZ     Patient Location:  OB  Start Time:  6/15/2019 11:20 AM  Reason for Block: labor analgesia    patient identified, IV checked, site marked, risks and benefits discussed, surgical consent, monitors and equipment checked, pre-op evaluation and timeout performed    Patient Position:  Sitting  Prep: ChloraPrep, patient draped and sterile technique    Monitoring:  Blood pressure, continuous pulse oximetry and heart rate  Approach:  Midline  Location:  L2-L3  Injection Technique:  MERON saline  Skin infiltration:  Lidocaine  Strength:  1%  Dose:  3ml  Needle Type:  Tuohy  Needle Gauge:  17 G  Needle Length:  3.5 in  Loss of resistance::  6  Catheter Size:  19 G  Catheter at Skin Depth:  11  Test Dose:  Lidocaine 1.5% with epinephrine 1-to-200,000  Test Dose Result:  Negative   DPE: classic MERON to saline, clear CSF via 27g pencan spinal needle, EZ cathether thread, negative aspiration x 2, negative test dose x 2 (3/2ml), all meds PF

## 2019-06-15 NOTE — CARE PLAN
Problem: Pain  Goal: Alleviation of Pain or a reduction in pain to the patient's comfort goal  Outcome: PROGRESSING AS EXPECTED  Pt desires IV pain medication, and,when she is ready, she would like an epidural, RN to continue to monitor     Problem: Risk for Infection, Impaired Wound Healing  Goal: Remain free from signs and symptoms of infection  Outcome: PROGRESSING AS EXPECTED  Pt remains free from s/s of infection

## 2019-06-15 NOTE — PROGRESS NOTES
Pt is 22 y/o  with c/o u/c's since . Pt denies LOF or VB, reports +FM. SVE 3/80/-2    MICHELLE Conn, notified of pt arrival and assessment, will recheck cervix in one hour.    MICHELLE Conn notified of repeat SVE, minimal change. Pt can be discharged with therapeutic rest  2335 Pt offered therapeutic rest and discharge home until she is more active in labor, pt reports she lives in town and consents to therapeutic rest and discharge.   2345 Pt given written and verbal discharge instructions to include labor warnings and [facility] instructions, pt verbalized understanding.   2350 Pt ambulated off unit in stable condition with significant other at side.

## 2019-06-15 NOTE — PROGRESS NOTES
0220_ Pt presents to LND c/o UC's. Pt denies LOF or VB and reports +FM. SVE 3/80/-2.  0230_ C. SUSANA Conn notified. Orders received to discharge home.  2241_ Discharge instructions given and pt verbalized understanding. Pt off the floor by wheelchair with FOB and mother.

## 2019-06-15 NOTE — ANESTHESIA PREPROCEDURE EVALUATION
23F o/w healthy requests labor analgesia.   Discussed risks/benefits NA. Questions answered.     Relevant Problems   No relevant active problems       Physical Exam    Airway   Mallampati: III  TM distance: >3 FB  Neck ROM: full       Cardiovascular - normal exam  Rhythm: regular  Rate: normal  (-) murmur     Dental - normal exam         Pulmonary - normal exam  Breath sounds clear to auscultation     Abdominal    Neurological - normal exam                 Anesthesia Plan    ASA 2       Plan - epidural   Neuraxial block will be labor analgesia              Pertinent diagnostic labs and testing reviewed    Informed Consent:    Anesthetic plan and risks discussed with patient.

## 2019-06-15 NOTE — PROGRESS NOTES
0700 - Report from JUAN Garcia RN  0750 - SVE 3/80/-1, no change  0830 - Dr. Lee updated, orders received to admit pt.  1505 - Dr. Lee at bedside, SVE /-1, AROM  1730 - Pt feeling pressure, SVE C/+1  1740 - Dr. Lee called to room for delivery   1755 -  of a viable female, 8/9 APGARS  1758 - Spontaneous delivery of an intact placenta  1900 - Report to Aquilino MCCARTY

## 2019-06-15 NOTE — H&P
OB H&P:    CC: Contractions     HPI:  Ms. Gwendolyn Masters is a 23 y.o.  @ 40w1d by JETT 19 by US at 8 weeks. Patient has presented multiple times over the last 24 hours due to complains of contractions and pain. Patient presents again with contractions and severe pain. Patient unable to tolerate pain at home.     Contractions: Yes   Loss of fluid: No   Vaginal bleeding: No   Fetal movement: present     PNC with TPC starting at 19 weeks     PNL: Negative   Rh positive, RubellaImmune, HIV neg, TrepAb neg, HBsAg NR, GC/CT neg/neg  Glucola: negative   GBS +     ROS:  Const: denies fevers, general concerns  CV/resp: reports no concerns  GI: denies abd pain, GI concerns  : see HPI  Neuro: denies HA/vision changes    OB History    Para Term  AB Living   1             SAB TAB Ectopic Molar Multiple Live Births                    # Outcome Date GA Lbr Rainer/2nd Weight Sex Delivery Anes PTL Lv   1 Current                 GYN: denies STIs, no cervical procedures    No past medical history on file.    Past Surgical History:   Procedure Laterality Date   • LAPAROSCOPIC LYSIS OF ADHESIONS N/A 2016    Procedure: LAPAROSCOPIC LYSIS OF ADHESIONS;  Surgeon: Venecia Hawk M.D.;  Location: SURGERY [facility];  Service:    • APPENDECTOMY LAPAROSCOPIC  2016    Procedure: APPENDECTOMY LAPAROSCOPIC;  Surgeon: Joao Dorantes M.D.;  Location: SURGERY [facility];  Service:    • APPENDECTOMY     • OTHER ORTHOPEDIC SURGERY      meniscus repair - RIGHT       No current facility-administered medications on file prior to encounter.      Current Outpatient Prescriptions on File Prior to Encounter   Medication Sig Dispense Refill   • PRENATAL VIT-DOCUSATE-IRON-FA PO Take  by mouth.         Family History   Problem Relation Age of Onset   • Diabetes Paternal Grandmother        Social History     Social History   • Marital status: Single     Spouse name: N/A   • Number of children: N/A   • Years of  "education: N/A     Occupational History   • Not on file.     Social History Main Topics   • Smoking status: Former Smoker     Packs/day: 0.15     Years: 1.00     Types: Cigarettes     Quit date: 2017   • Smokeless tobacco: Never Used   • Alcohol use No   • Drug use: Yes     Types: Marijuana      Comment: last used when she found out she was pregnant   • Sexual activity: Not Currently     Partners: Male      Comment: Unplanned pregnancy. pt states was not on birth control     Other Topics Concern   • Not on file     Social History Narrative   • No narrative on file       PE:  Vitals:    06/15/19 0555 06/15/19 0600   BP: 120/82    Pulse: 97    Weight:  83.9 kg (185 lb)   Height:  1.778 m (5' 10\")     gen: AAO, NAD  abd: soft, gravid, NT, [id number]  Ext: NT, no edema    SVE: 3 cm (performed by admitting nurse)   FHT: 130s baseline, moderate variability/ +accels/ no decels  Pawel: q5-6 min    A/P: 23 y.o.  @ 40w1d by JETT 19 by US at 8 weeks. Admission for latent labor, patient unable to tolerate contractions at home.   - IV pain medication at this time; patient will want epidural once further progressed   - GBS positive, penicillin indicated at this time   - Will induce with pitocin   - Anticipate    - CBC on admission hemoglobin of 11.2                 "

## 2019-06-15 NOTE — PROGRESS NOTES
1120 - Dr. Hackett at bedside. Time out called at 1120. Epidural placed at this time by Dr Hackett. Test dose at 1128 - No reaction detected - VSS. Dermatome level of T8. Epidural infusion settings are : 10mL/hr continuous infusion, 5mL bolus every 15 minutes with a 25mL/hr dose limit.

## 2019-06-16 LAB
ERYTHROCYTE [DISTWIDTH] IN BLOOD BY AUTOMATED COUNT: 45.4 FL (ref 35.9–50)
HCT VFR BLD AUTO: 30 % (ref 37–47)
HGB BLD-MCNC: 9.8 G/DL (ref 12–16)
MCH RBC QN AUTO: 30.3 PG (ref 27–33)
MCHC RBC AUTO-ENTMCNC: 32.7 G/DL (ref 33.6–35)
MCV RBC AUTO: 92.9 FL (ref 81.4–97.8)
PLATELET # BLD AUTO: 154 K/UL (ref 164–446)
PMV BLD AUTO: 12.6 FL (ref 9–12.9)
RBC # BLD AUTO: 3.23 M/UL (ref 4.2–5.4)
WBC # BLD AUTO: 14.9 K/UL (ref 4.8–10.8)

## 2019-06-16 PROCEDURE — A9270 NON-COVERED ITEM OR SERVICE: HCPCS | Performed by: OBSTETRICS & GYNECOLOGY

## 2019-06-16 PROCEDURE — A9270 NON-COVERED ITEM OR SERVICE: HCPCS | Performed by: ADVANCED PRACTICE MIDWIFE

## 2019-06-16 PROCEDURE — 36415 COLL VENOUS BLD VENIPUNCTURE: CPT

## 2019-06-16 PROCEDURE — 302128 INFUSION PUMP W/POLE: Performed by: OBSTETRICS & GYNECOLOGY

## 2019-06-16 PROCEDURE — 85027 COMPLETE CBC AUTOMATED: CPT

## 2019-06-16 PROCEDURE — 700102 HCHG RX REV CODE 250 W/ 637 OVERRIDE(OP): Performed by: OBSTETRICS & GYNECOLOGY

## 2019-06-16 PROCEDURE — 700102 HCHG RX REV CODE 250 W/ 637 OVERRIDE(OP): Performed by: ADVANCED PRACTICE MIDWIFE

## 2019-06-16 PROCEDURE — 770002 HCHG ROOM/CARE - OB PRIVATE (112)

## 2019-06-16 RX ORDER — FERROUS SULFATE 325(65) MG
325 TABLET ORAL 2 TIMES DAILY WITH MEALS
Status: DISCONTINUED | OUTPATIENT
Start: 2019-06-16 | End: 2019-06-17 | Stop reason: HOSPADM

## 2019-06-16 RX ADMIN — OXYCODONE HYDROCHLORIDE AND ACETAMINOPHEN 1 TABLET: 5; 325 TABLET ORAL at 04:49

## 2019-06-16 RX ADMIN — OXYCODONE HYDROCHLORIDE AND ACETAMINOPHEN 1 TABLET: 5; 325 TABLET ORAL at 15:49

## 2019-06-16 RX ADMIN — OXYCODONE HYDROCHLORIDE AND ACETAMINOPHEN 1 TABLET: 5; 325 TABLET ORAL at 20:33

## 2019-06-16 RX ADMIN — FERROUS SULFATE TAB 325 MG (65 MG ELEMENTAL FE) 325 MG: 325 (65 FE) TAB at 18:23

## 2019-06-16 RX ADMIN — IBUPROFEN 800 MG: 800 TABLET ORAL at 23:17

## 2019-06-16 RX ADMIN — FERROUS SULFATE TAB 325 MG (65 MG ELEMENTAL FE) 325 MG: 325 (65 FE) TAB at 08:15

## 2019-06-16 RX ADMIN — IBUPROFEN 800 MG: 800 TABLET ORAL at 14:10

## 2019-06-16 RX ADMIN — IBUPROFEN 800 MG: 800 TABLET ORAL at 04:49

## 2019-06-16 ASSESSMENT — EDINBURGH POSTNATAL DEPRESSION SCALE (EPDS)
I HAVE FELT SAD OR MISERABLE: NO, NOT AT ALL
THINGS HAVE BEEN GETTING ON TOP OF ME: NO, I HAVE BEEN COPING AS WELL AS EVER
I HAVE BEEN SO UNHAPPY THAT I HAVE BEEN CRYING: NO, NEVER
THE THOUGHT OF HARMING MYSELF HAS OCCURRED TO ME: NEVER
I HAVE FELT SCARED OR PANICKY FOR NO GOOD REASON: NO, NOT AT ALL
I HAVE BEEN SO UNHAPPY THAT I HAVE HAD DIFFICULTY SLEEPING: NOT AT ALL
I HAVE BEEN ANXIOUS OR WORRIED FOR NO GOOD REASON: YES, SOMETIMES
I HAVE BEEN ABLE TO LAUGH AND SEE THE FUNNY SIDE OF THINGS: AS MUCH AS I ALWAYS COULD
I HAVE LOOKED FORWARD WITH ENJOYMENT TO THINGS: AS MUCH AS I EVER DID
I HAVE BLAMED MYSELF UNNECESSARILY WHEN THINGS WENT WRONG: NOT VERY OFTEN

## 2019-06-16 NOTE — CONSULTS
"Baby at the breast, cross cradle hold at time of visit. Baby has wide mouth, deep latch, suckling contently. Mom expressed \"some tenderness\" but no feeling of pinching or biting. Mom will observe nipple shape when baby comes off the breast. Mom strongly encouraged to call for assistance at next feeding if nipple misshapen, blanched, or sore.     "

## 2019-06-16 NOTE — PROGRESS NOTES
Patient arrived to S334 with infant in arms accompanied by SO.  Report received and bands verified with BIBIANA Martinez.  Patient oriented to room, call light, emergency pull cord, infant safe sleep policy, infant feeding frequency, unit routines and plan of care.  States understanding.  All questions answered at this time, will continue to monitor.

## 2019-06-16 NOTE — CARE PLAN
Problem: Potential for postpartum infection related to presence of episiotomy/vaginal tear and/or uterine contamination  Goal: Patient will be absent from signs and symptoms of infection  Outcome: PROGRESSING AS EXPECTED  No signs or symptoms of infection noted    Problem: Alteration in comfort related to episiotomy, vaginal repair and/or after birth pains  Goal: Patient is able to ambulate, care for self and infant  Outcome: PROGRESSING AS EXPECTED  Taking pain pills very infrwequently. States adequate pain relief

## 2019-06-16 NOTE — ANESTHESIA TIME REPORT
Anesthesia Start and Stop Event Times     Date Time Event    6/15/2019 1117 Anesthesia Start     1755 Anesthesia Stop        Responsible Staff  06/15/19    Name Role Begin End    Suellen Hackett M.D. Anesth 1117 1753        Preop Diagnosis (Free Text):  Pre-op Diagnosis             Preop Diagnosis (Codes):    Post op Diagnosis  Pregnancy      Premium Reason  E. Weekend    Comments:

## 2019-06-16 NOTE — L&D DELIVERY NOTE
Normal Spontaneous Vaginal Delivery    Date of procedure: 6/15/2019  PreOp Dx: 40w1d wk IUP in active labor  PostOp Dx: Same with delivery of a viable female infant at 1755 hours weighing 3910gr with APGARS of 8 and 9 and 1 and 5min respectively.  Procedure: Spontaneous vaginal delivery  Surgeon: Janie Lee DO  Assistants: none  Anesthesia: Epidural  EBL: 400cc  Indications: as above  Procedure: The patient was noted to be complete so was placed in dorsal lithotomy position, prepped and draped in the usual sterile fashion for delivery. The patient was asked to push and the head was delivered spontaneously in the cephalic JARVIS position over and intact perineum. A nuchal cord was checked and a loose nuchal was reduced easily. The anterior shoulder delivered easily and the posterior shoulder followed. The remainder of the infant was easily delivered and the oropharynx and nasopharynx was bulb suctioned. The infant was noted to have spontaneous cry and spontaneous movement of all four extremities. The cord was clamped x 2 and cut - it was noted to have 3 vessels. The infant was passed to the mother and  nursing/NICU personnel was in attendance. The placeta delivered intact spontaneously and the uterus was evacuated of clots. Pitocin 20 units in 1L was started to firm the uterus. Examination of cervix and vaginal vault revealed bilateral laibal lacerations. The laceration was repaired with 3-0 chromic in the normal fashion. The patient tolerated this procedure well and recovered in L&D with her infant. All sponge, needle, and instrument counts were correct.

## 2019-06-16 NOTE — PROGRESS NOTES
"Gwendolyn Avalosley PP day 1    Subjective: Abdominal pain. no, ambulating .yes, tolerating liquids .yes, tolerating regular diet .yes, flatus.no, BM .no, Bleeding .yes, voiding .yes,dizziness .no, breast feeding.yes, breast tenderness .no    /68   Pulse 71   Temp 36.7 °C (98 °F) (Temporal)   Resp 18   Ht 1.778 m (5' 10\")   Wt 83.9 kg (185 lb)   SpO2 94%   Breast Exam: Tenderness .no, Engourgement .no, Mastitis .no  Abdomen soft, non-tender. BS normal. No masses,  No organomegaly  Incision: none  Fundus Tenderness:no, Below umbilicus:Yes, U-1  Perineumperineum intact  ExtremitiesNormal    Meds:   No current facility-administered medications on file prior to encounter.      Current Outpatient Prescriptions on File Prior to Encounter   Medication Sig Dispense Refill   • PRENATAL VIT-DOCUSATE-IRON-FA PO Take  by mouth.         Lab:   Recent Results (from the past 48 hour(s))   Hold Blood Bank Specimen (Not Tested)    Collection Time: 06/15/19  8:57 AM   Result Value Ref Range    Holding Tube - Bb DONE    CBC WITH DIFFERENTIAL    Collection Time: 06/15/19  8:57 AM   Result Value Ref Range    WBC 14.2 (H) 4.8 - 10.8 K/uL    RBC 3.71 (L) 4.20 - 5.40 M/uL    Hemoglobin 11.2 (L) 12.0 - 16.0 g/dL    Hematocrit 34.7 (L) 37.0 - 47.0 %    MCV 93.5 81.4 - 97.8 fL    MCH 30.2 27.0 - 33.0 pg    MCHC 32.3 (L) 33.6 - 35.0 g/dL    RDW 46.0 35.9 - 50.0 fL    Platelet Count 180 164 - 446 K/uL    MPV 12.2 9.0 - 12.9 fL    Neutrophils-Polys 80.40 (H) 44.00 - 72.00 %    Lymphocytes 11.60 (L) 22.00 - 41.00 %    Monocytes 7.10 0.00 - 13.40 %    Eosinophils 0.20 0.00 - 6.90 %    Basophils 0.30 0.00 - 1.80 %    Immature Granulocytes 0.40 0.00 - 0.90 %    Nucleated RBC 0.00 /100 WBC    Neutrophils (Absolute) 11.39 (H) 2.00 - 7.15 K/uL    Lymphs (Absolute) 1.65 1.00 - 4.80 K/uL    Monos (Absolute) 1.01 (H) 0.00 - 0.85 K/uL    Eos (Absolute) 0.03 0.00 - 0.51 K/uL    Baso (Absolute) 0.04 0.00 - 0.12 K/uL    Immature Granulocytes (abs) " 0.05 0.00 - 0.11 K/uL    NRBC (Absolute) 0.00 K/uL   CBC without differential    Collection Time: 19  1:42 AM   Result Value Ref Range    WBC 14.9 (H) 4.8 - 10.8 K/uL    RBC 3.23 (L) 4.20 - 5.40 M/uL    Hemoglobin 9.8 (L) 12.0 - 16.0 g/dL    Hematocrit 30.0 (L) 37.0 - 47.0 %    MCV 92.9 81.4 - 97.8 fL    MCH 30.3 27.0 - 33.0 pg    MCHC 32.7 (L) 33.6 - 35.0 g/dL    RDW 45.4 35.9 - 50.0 fL    Platelet Count 154 (L) 164 - 446 K/uL    MPV 12.6 9.0 - 12.9 fL       Assessment and Plan  1. PP day 1 s/p   2. Breastfeeding  3. Anemia    Plan  1. Continue routine postpartum care; anticipate discharge PP day #2  2. Discussed breastfeeding in detail with patient. Encouraged water intake, continued prenatal vitamins, and adequate diet.  3. For postpartum birth control, patient unsure. She and FOB will discuss.  4. Start ferrous sulfate BID.

## 2019-06-16 NOTE — CARE PLAN
Problem: Pain Management  Goal: Pain level will decrease to patient's comfort goal  Outcome: PROGRESSING AS EXPECTED  Patient reports moderate pain, reports relief with PRN medications and heat, will continue to monitor.     Problem: Altered physiologic condition related to immediate post-delivery state and potential for bleeding/hemorrhage  Goal: Patient physiologically stable as evidenced by normal lochia, palpable uterine involution and vital signs within normal limits  Outcome: PROGRESSING AS EXPECTED  Patient VSS, fundus firm, light lochia, will continue to monitor.

## 2019-06-16 NOTE — ANESTHESIA QCDR
2019 [facility] Clinical Data Registry (for Quality Improvement)     Postoperative nausea/vomiting risk protocol (Adult = 18 yrs and Pediatric 3-17 yrs)- (430 and 463)  General inhalation anesthetic (NOT TIVA) with PONV risk factors: No  Provision of anti-emetic therapy with at least 2 different classes of agents: N/A  Patient DID NOT receive anti-emetic therapy and reason is documented in Medical Record: N/A    Multimodal Pain Management- (AQI59)  Patient undergoing Elective Surgery (i.e. Outpatient, or ASC, or Prescheduled Surgery prior to Hospital Admission): No  Use of Multimodal Pain Management, two or more drugs and/or interventions, NOT including systemic opioids: N/A  Exception: Documented allergy to multiple classes of analgesics: N/A    PACU assessment of acute postoperative pain prior to Anesthesia Care End- Applies to Patients Age = 18- (ABG7)  Initial PACU pain score is which of the following: < 7/10  Patient unable to report pain score: N/A    Post-anesthetic transfer of care checklist/protocol to PACU/ICU- (426 and 427)  Upon conclusion of case, patient transferred to which of the following locations: PACU/Non-ICU  Use of transfer checklist/protocol: Yes  Exclusion: Service Performed in Patient Hospital Room (and thus did not require transfer): N/A    PACU Reintubation- (AQI31)  General anesthesia requiring endotracheal intubation (ETT) along with subsequent extubation in OR or PACU: No  Required reintubation in the PACU: N/A  Extubation was a planned trial documented in the medical record prior to removal of the original airway device: N/A    Unplanned admission to ICU related to anesthesia service up through end of PACU care- (MD51)  Unplanned admission to ICU (not initially anticipated at anesthesia start time): No

## 2019-06-16 NOTE — ANESTHESIA POSTPROCEDURE EVALUATION
Patient: Gwendolyn Masters    Procedure Summary     Date:  06/15/19 Room / Location:      Anesthesia Start:  1117 Anesthesia Stop:  1755    Procedure:  Labor Epidural Diagnosis:      Scheduled Providers:   Responsible Provider:  Suellen Hackett M.D.    Anesthesia Type:  epidural ASA Status:  2          Final Anesthesia Type: epidural  Last vitals  BP   Blood Pressure: 112/64    Temp   36.9 °C (98.4 °F)    Pulse   Pulse: 78   Resp        SpO2   100 %      Anesthesia Post Evaluation    Patient location during evaluation: PACU  Patient participation: complete - patient participated  Level of consciousness: awake and alert  Pain score: 0    Airway patency: patent  Anesthetic complications: no  Cardiovascular status: hemodynamically stable  Respiratory status: acceptable  Hydration status: euvolemic    PONV: none

## 2019-06-16 NOTE — PROGRESS NOTES
received report from BIBIANA Esquivel. Pt stable, baby in arms, fundus firm, family at side. Ibuprofen given for pain (see mar).  Up to br, unable to void, self pericare performed with RN instruction, topicals used, brief and peripad placed.   Transferred to s334 via wheelchair with infant in arms, SO at side with personal belongings  Report given to BIBIANA Ring

## 2019-06-17 VITALS
DIASTOLIC BLOOD PRESSURE: 79 MMHG | HEART RATE: 78 BPM | SYSTOLIC BLOOD PRESSURE: 116 MMHG | RESPIRATION RATE: 16 BRPM | WEIGHT: 185 LBS | TEMPERATURE: 98.4 F | HEIGHT: 70 IN | BODY MASS INDEX: 26.48 KG/M2 | OXYGEN SATURATION: 94 %

## 2019-06-17 PROCEDURE — A9270 NON-COVERED ITEM OR SERVICE: HCPCS | Performed by: ADVANCED PRACTICE MIDWIFE

## 2019-06-17 PROCEDURE — A9270 NON-COVERED ITEM OR SERVICE: HCPCS | Performed by: OBSTETRICS & GYNECOLOGY

## 2019-06-17 PROCEDURE — 700102 HCHG RX REV CODE 250 W/ 637 OVERRIDE(OP): Performed by: OBSTETRICS & GYNECOLOGY

## 2019-06-17 PROCEDURE — 700102 HCHG RX REV CODE 250 W/ 637 OVERRIDE(OP): Performed by: ADVANCED PRACTICE MIDWIFE

## 2019-06-17 RX ORDER — IBUPROFEN 800 MG/1
800 TABLET ORAL EVERY 8 HOURS PRN
Qty: 30 TAB | Refills: 1 | Status: SHIPPED | OUTPATIENT
Start: 2019-06-17 | End: 2021-04-08

## 2019-06-17 RX ORDER — FERROUS SULFATE 325(65) MG
325 TABLET ORAL 2 TIMES DAILY WITH MEALS
Qty: 30 TAB | Refills: 1 | Status: SHIPPED | OUTPATIENT
Start: 2019-06-17 | End: 2021-04-08

## 2019-06-17 RX ORDER — OXYCODONE HYDROCHLORIDE AND ACETAMINOPHEN 5; 325 MG/1; MG/1
1 TABLET ORAL EVERY 4 HOURS PRN
Qty: 15 TAB | Refills: 0 | Status: SHIPPED | OUTPATIENT
Start: 2019-06-17 | End: 2019-06-24

## 2019-06-17 RX ADMIN — OXYCODONE HYDROCHLORIDE AND ACETAMINOPHEN 1 TABLET: 5; 325 TABLET ORAL at 16:16

## 2019-06-17 RX ADMIN — IBUPROFEN 800 MG: 800 TABLET ORAL at 09:12

## 2019-06-17 RX ADMIN — OXYCODONE HYDROCHLORIDE AND ACETAMINOPHEN 1 TABLET: 5; 325 TABLET ORAL at 11:33

## 2019-06-17 RX ADMIN — OXYCODONE HYDROCHLORIDE AND ACETAMINOPHEN 1 TABLET: 5; 325 TABLET ORAL at 05:53

## 2019-06-17 RX ADMIN — OXYCODONE HYDROCHLORIDE AND ACETAMINOPHEN 1 TABLET: 5; 325 TABLET ORAL at 01:14

## 2019-06-17 RX ADMIN — FERROUS SULFATE TAB 325 MG (65 MG ELEMENTAL FE) 325 MG: 325 (65 FE) TAB at 09:08

## 2019-06-17 NOTE — DISCHARGE INSTRUCTIONS
POSTPARTUM DISCHARGE INSTRUCTIONS FOR MOM    YOB: 1995   Age: 23 y.o.               Admit Date: 6/15/2019     Discharge Date: 2019  Attending Doctor:  Yonathan Khanna M.D.                  Allergies:  Erythromycin and Morphine    Discharged to home by car. Discharged via wheelchair, hospital escort: Yes.  Special equipment needed: Not Applicable  Belongings with: Personal  Be sure to schedule a follow-up appointment with your primary care doctor or any specialists as instructed.     Discharge Plan:   Diet Plan: Discussed  Activity Level: Discussed  Confirmed Follow up Appointment: Patient to Call and Schedule Appointment  Confirmed Symptoms Management: Discussed  Medication Reconciliation Updated: Yes  Influenza Vaccine Indication: Not indicated: Previously immunized this influenza season and > 8 years of age    REASONS TO CALL YOUR OBSTETRICIAN:  1.   Persistent fever or shaking chills (Temperature higher than 100.4)  2.   Heavy bleeding (soaking more than 1 pad per hour); Passing clots  3.   Foul odor from vagina  4.   Mastitis (Breast infection; breast pain, chills, fever, redness)  5.   Urinary pain, burning or frequency  6.   Episiotomy infection  7.   Abdominal incision infection  8.   Severe depression longer than 24 hours    HAND WASHING  · Prior to handling the baby.  · Before breastfeeding or bottle feeding baby.  · After using the bathroom or changing the baby's diaper.    WOUND CARE  Ask your physician for additional care instructions.  In general:    ·  Incision:      · Keep clean and dry.    · Do NOT lift anything heavier than your baby for up to 6 weeks.    · There should not be any opening or pus.      VAGINAL CARE  · Nothing inside vagina for 6 weeks: no sexual intercourse, tampons or douching.  · Bleeding may continue for 2-4 weeks.  Amount may vary.    · Call your physician for heavy bleeding which means soaking more than 1 pad per hour    BIRTH CONTROL  · It is possible  "to become pregnant at any time after delivery and while breastfeeding.  · Plan to discuss a method of birth control with your physician at your follow up visit. visit.    DIET AND ELIMINATION  · Eating more fiber (bran cereal, fruits, and vegetables) and drinking plenty of fluids will help to avoid constipation.  · Urinary frequency after childbirth is normal.    POSTPARTUM BLUES  During the first few days after birth, you may experience a sense of the \"blues\" which may include impatience, irritability or even crying.  These feeling come and go quickly.  However, as many as 1 in 10 women experience emotional symptoms known as postpartum depression.    Postpartum depression:  May start as early as the second or third day after delivery or take several weeks or months to develop.  Symptoms of \"blues\" are present, but are more intense:  Crying spells; loss of appetite; feelings of hopelessness or loss of control; fear of touching the baby; over concern or no concern at all about the baby; little or no concern about your own appearance/caring for yourself; and/or inability to sleep or excessive sleeping.  Contact your physician if you are experiencing any of these symptoms.    Crisis Hotline:  · [place] Crisis Hotline:  8-381-IJZMQSE  Or 1-416.971.7298  · [place] Crisis Hotline:  1-736.345.3221  Or 463-953-5119    PREVENTING SHAKEN BABY:  If you are angry or stressed, PUT THE BABY IN THE CRIB, step away, take some deep breaths, and wait until you are calm to care for the baby.  DO NOT SHAKE THE BABY.  You are not alone, call a supporter for help.    · Crisis Call Center 24/7 crisis line 747-279-4118 or 1-577.644.7259  · You can also text them, text \"ANSWER\" to 306584    QUIT SMOKING/TOBACCO USE:  I understand the use of any tobacco products increases my chance of suffering from future heart disease and could cause other illnesses which may shorten my life. Quitting the use of tobacco products is the single most " important thing I can do to improve my health. For further information on smoking / tobacco cessation call a Toll Free Quit Line at 1-798.190.2640 (*National [employer] [place]) or 1-890.558.5848 (American Lung Association) or you can access the web based program at www.lungusa.org.    · [place] Tobacco Users Help Line:  (141) 508-8144       Toll Free: 1-858.830.1789  · Quit Tobacco Program [facility] [place] [facility] (288)309-6409    DEPRESSION / SUICIDE RISK:  As you are discharged from this [facility], it is important to learn how to keep safe from harming yourself.    Recognize the warning signs:  · Abrupt changes in personality, positive or negative- including increase in energy   · Giving away possessions  · Change in eating patterns- significant weight changes-  positive or negative  · Change in sleeping patterns- unable to sleep or sleeping all the time   · Unwillingness or inability to communicate  · Depression  · Unusual sadness, discouragement and loneliness  · Talk of wanting to die  · Neglect of personal appearance   · Rebelliousness- reckless behavior  · Withdrawal from people/activities they love  · Confusion- inability to concentrate     If you or a loved one observes any of these behaviors or has concerns about self-harm, here's what you can do:  · Talk about it- your feelings and reasons for harming yourself  · Remove any means that you might use to hurt yourself (examples: pills, rope, extension cords, firearm)  · Get professional help from the community (Mental Health, Substance Abuse, psychological counseling)  · Do not be alone:Call your Safe Contact- someone whom you trust who will be there for you.  · Call your local CRISIS HOTLINE 917-7556 or 143-140-4848  · Call your local Children's Mobile Crisis Response Team [place] (951) 032-2314 or www.[employer]  · Call the toll free National Suicide Prevention Hotlines   · National Suicide Prevention Lifeline 531-805-BOWP  (1870)  · [facility] 800-SUICIDE (396-2281)    DISCHARGE SURVEY:  Thank you for choosing [facility].  We hope we provided you with very good care.  You may be receiving a survey in the mail.  Please fill it out.  Your opinion is valuable to us.    ADDITIONAL EDUCATIONAL MATERIALS GIVEN TO PATIENT:        My signature on this form indicates that:  1.  I have reviewed and understand the above information  2.  My questions regarding this information have been answered to my satisfaction.  3.  I have formulated a plan with my discharge nurse to obtain my prescribed medication for home.

## 2019-06-17 NOTE — LACTATION NOTE
"Met with MOB for a lactation follow up visit.  MOB reported infant has been feeding at the breast often and for up to one hour at a time each feeding.  MOB stated nipples are tender.    Latch assistance provided.  Encouraged MOB to sit back up against the head of the bed and to elevate infant up to the breat with pillows.  Infant opened mouth wide and latched deep onto the breast immediately.  MOB reported \"pinching\" with latch.  Infant's lips observed to be curled under and corrected.  MOB reported increased comfort with latch.  Discussed nutritive vs non-nutritive sucking and encouraged MOB to stimulate infant via touch to get infant to suck in nutritive manner.  Informed MOB not to put time restrictions on how long infant should feed at the breast.  MOB also encouraged to offer both breasts at each feeding.  See Lactation Assessment Flow Sheet under infant's chart for latch score and assessment findings.    MOB stated was enrolled in [place] this morning.  MOB encouraged to follow up with [facility] with any lactation and/or questions that should arise post discharge.    MOB stated was provided with Lanolin cream and is applying it as instructed.    Breastfeeding Plan:  Offer infant the breast on demand per feeding cues and within 3 hours from the last feed for a minimum of 8-12 times in a 24 hour period.       MOB verbalized understanding of all information provided to her and denied having any further questions at this time.  Encouraged MOB to call for lactation assistance as needed.  "

## 2019-06-17 NOTE — DISCHARGE SUMMARY
Discharge Summary:      Gwendolyn Masters      Admit Date:   6/15/2019  Discharge Date:  2019     Admitting diagnosis:  Pregnancy active labor. GBS positive  Labor and delivery indication for care or intervention  Labor and delivery indication for care or intervention  Discharge Diagnosis: Status post vaginal, spontaneous.  Pregnancy Complications: group B strep (treated)  Tubal Ligation:  no        History:  No past medical history on file.  OB History    Para Term  AB Living   1 1 1     1   SAB TAB Ectopic Molar Multiple Live Births           0 1      # Outcome Date GA Lbr Rainer/2nd Weight Sex Delivery Anes PTL Lv   1 Term 06/15/19 40w1d / 00:25 3.91 kg (8 lb 9.9 oz) F Vag-Spont EPI N CINDI           Erythromycin and Morphine  Patient Active Problem List    Diagnosis Date Noted   • Vaginal delivery 2019   • GBS (group B Streptococcus carrier), +RV culture, currently pregnant 2019   • Encounter for supervision of normal first pregnancy in third trimester 2019        Hospital Course:   23 y.o. , now para 1, was admitted with the above mentioned diagnosis, underwent Active Labor, vaginal, spontaneous. Patient postpartum course was unremarkable, with progressive advancement in diet , ambulation and toleration of oral analgesia. Patient without complaints today and desires discharge.      Vitals:    06/15/19 2055 19 0200 19 0600 19 1800   BP: 119/74 116/73 107/68 112/67   Pulse: 80 62 71 70   Resp: 17 17 18 17   Temp: 36.9 °C (98.4 °F) 36.6 °C (97.8 °F) 36.7 °C (98 °F) 36.4 °C (97.5 °F)   TempSrc: Temporal Temporal Temporal Temporal   SpO2: 94% 96% 94% 96%   Weight:       Height:           Current Facility-Administered Medications   Medication Dose   • ferrous sulfate tablet 325 mg  325 mg   • ondansetron (ZOFRAN ODT) dispertab 4 mg  4 mg    Or   • ondansetron (ZOFRAN) syringe/vial injection 4 mg  4 mg   • oxytocin (PITOCIN) infusion (for postpartum)    mL/hr   • ibuprofen (MOTRIN) tablet 800 mg  800 mg   • acetaminophen (TYLENOL) tablet 325 mg  325 mg   • oxyCODONE-acetaminophen (PERCOCET) 5-325 MG per tablet 1 Tab  1 Tab   • oxyCODONE-acetaminophen (PERCOCET-10)  MG per tablet 1 Tab  1 Tab   • LR infusion     • miSOPROStol (CYTOTEC) tablet 600 mcg  600 mcg   • PRN oxytocin (PITOCIN) (20 Units/1000 mL) PRN for excessive uterine bleeding - See Admin Instr  125-999 mL/hr   • methylergonovine (METHERGINE) injection 0.2 mg  0.2 mg   • ibuprofen (MOTRIN) tablet 800 mg  800 mg   • oxytocin (PITOCIN) 20 UNITS/1000ML LR (postpartum)   mL/hr       Exam:  Breast Exam: negative  Abdomen: Abdomen soft, non-tender. BS normal. No masses,  No organomegaly  Fundus Non Tender: yes  Incision: none  Perineum: perineum intact  Extremity: extremities, peripheral pulses and reflexes normal     Labs:  Recent Labs      06/15/19   0857  06/16/19   0142   WBC  14.2*  14.9*   RBC  3.71*  3.23*   HEMOGLOBIN  11.2*  9.8*   HEMATOCRIT  34.7*  30.0*   MCV  93.5  92.9   MCH  30.2  30.3   MCHC  32.3*  32.7*   RDW  46.0  45.4   PLATELETCT  180  154*   MPV  12.2  12.6        Activity:   Discharge to home  Pelvic Rest x 6 weeks    Assessment:  normal postpartum course  Discharge Assessment: No areas of skin breakdown/redness; surgical incision intact/healing     Follow up: [facility] or [facility] in 5 weeks for vaginal ; 1 week for incision check.      Discharge Meds:   Current Outpatient Prescriptions   Medication Sig Dispense Refill   • oxyCODONE-acetaminophen (PERCOCET) 5-325 MG Tab Take 1 Tab by mouth every four hours as needed for up to 7 days. 15 Tab 0   • ibuprofen (MOTRIN) 800 MG Tab Take 1 Tab by mouth every 8 hours as needed (For cramping after delivery; do not give if patient is receiving ketorolac (Toradol)). 30 Tab 1   • ferrous sulfate 325 (65 Fe) MG tablet Take 1 Tab by mouth 2 times a day, with meals. 30 Tab 1     Patient is still undecided about contraception and  will follow up at the office. She has prenatal vitamins at home. She will take iron.     Cara Conn D.N.P.

## 2019-06-17 NOTE — PROGRESS NOTES
Assumed care of patient, report from Edelmira MCCARTY.  Patient assessment complete, plan of care discussed, all questions answered at this time.  Will continue to monitor.

## 2019-06-17 NOTE — PROGRESS NOTES
Bedside shift report complete w/ María Elena RN. Plan of care and prior shift events reviewed with patient and RN. Lines/drains/airways assessed as appropriate. Environment assessed for pt safety, walkways clear of obstacles and well lit, personal items/call light in reach, bed locked/low. Pt alert and responds appropriately, educated about hourly rounding, assessed for needs, see MAR/OBS/ADL doc flow sheets for interventions. Care board updated with changes to plan of care, current staff names, and date as appropriate

## 2019-06-17 NOTE — CARE PLAN
Problem: Pain Management  Goal: Pain level will decrease to patient's comfort goal  Outcome: PROGRESSING AS EXPECTED  Patient reports moderate pain, reports relief with PRN medications, will continue to monitor.     Problem: Altered physiologic condition related to immediate post-delivery state and potential for bleeding/hemorrhage  Goal: Patient physiologically stable as evidenced by normal lochia, palpable uterine involution and vital signs within normal limits  Outcome: PROGRESSING AS EXPECTED  VSS, fundus firm, light lochia, will continue to monitor.

## 2019-06-18 NOTE — LACTATION NOTE
Called to room for latch assistance.  MOB stated infant is sleepy and has not fed for approximately 3 hours.  Infant was cluster feeding prior to this and finally took a three hour nap after the last feeding.    Removed infant from clothes and undressed her down to the diaper.  Stimulated infant to wake up via touch.  Infant awake and showing hunger cues.  Put infant to the left breast in the cross cradle position.  Demonstrated to MOB on how to wedge breast for deeper latch.  MOB encouraged to place her right hand behind the areola instead of on the rim of it to allow infant to latch onto it as much as possible.  Instructed MOB to wait for infant to open her mouth wide before placing her nipple in infant's mouth.  Infant observed to be closing her mouth almost immediately when coming into contact with MOB's breast.  Shallow latch observed.  Demonstrated to MOB on how to break latch.  Latch released.  Once infant's mouth was opened wide, infant again put to the breast and deep latch achieved.  MOB stated tenderness with latch has decreased.  See Latch Assessment Flow Sheet for latch score and assessment findings.    Breastfeeding plan remains unchanged.    MOB verbalized understanding of all information provided to her and denied having any further questions at this time.  Encouraged MOB to call for lactation assistance as needed.

## 2019-08-15 ENCOUNTER — POST PARTUM (OUTPATIENT)
Dept: OBGYN | Facility: CLINIC | Age: 24
End: 2019-08-15
Payer: MEDICAID

## 2019-08-15 VITALS — WEIGHT: 173 LBS | SYSTOLIC BLOOD PRESSURE: 116 MMHG | DIASTOLIC BLOOD PRESSURE: 72 MMHG | BODY MASS INDEX: 24.82 KG/M2

## 2019-08-15 PROCEDURE — 0503F POSTPARTUM CARE VISIT: CPT | Performed by: NURSE PRACTITIONER

## 2019-08-15 RX ORDER — NORGESTIMATE AND ETHINYL ESTRADIOL 7DAYSX3 LO
1 KIT ORAL DAILY
Qty: 28 TAB | Refills: 12 | Status: SHIPPED | OUTPATIENT
Start: 2019-08-15 | End: 2021-04-08

## 2019-08-15 ASSESSMENT — ENCOUNTER SYMPTOMS
EYES NEGATIVE: 1
NEUROLOGICAL NEGATIVE: 1
RESPIRATORY NEGATIVE: 1
GASTROINTESTINAL NEGATIVE: 1
PSYCHIATRIC NEGATIVE: 1
CONSTITUTIONAL NEGATIVE: 1
MUSCULOSKELETAL NEGATIVE: 1
CARDIOVASCULAR NEGATIVE: 1

## 2019-08-15 NOTE — PROGRESS NOTES
Pt here today for postpartum exam.  Delivery Date: 06/15/2019  Formula feeding only  BCM: unsure what to use for BC, [facility] and [facility].   LMP: 07/16/2019  WT: 173 lb  BP: 116/72  Pt states no complaints or concerns today  Good ph: [phone]

## 2019-08-15 NOTE — PROGRESS NOTES
Subjective:      Gwendolyn Masters is a 23 y.o. female who presents with No chief complaint on file.            S   22 y/o now  s/p  on 6/15/19 of baby girl weighing 3910g without complications. Bilateral labial laceration. Now 8 weeks postpartum. Prenatal course significant for no issues. Postpartum course without any complications. Feeling well and happy with baby, denies any severe mood swings or s/sx of postpartum depression and anxiety.    Baby is doing well, formula exclusively.   Has resumed sexual activity once with pull out method two weeks ago.  Mother reports no issues with bowel or bladder routine, continued regular diet. Bleeding since birth has subsided at this time with return to menses on . No vaginal pain/odor/itching, fever, headaches, dizziness/SOB or dysuria. Desires pills for contraception.     O  See PE: Physical exam today with no abnormal findings  Vital signs WNL: BP and weight   H/H: 9.8/30.0  PAP: NILM on 2019    A  Reassuring exam of postpartum woman s/p  on 6/15/19    P  - Rx pills for contraception  - Quick start missed pills and back up method reviewed   - Resumption of sexual activity: safe sex precautions given  - Counseling on nutrition, adequate hydration, and exercise   - Kegel Exercises for pelvic floor/prevention of urinary incontinence   - Counseling on PAP guidelines re: next PAP due 2022  - Warning s/sx of postpartum infection, depression, preeclampsia   - RTC PRN           Review of Systems   Constitutional: Negative.    HENT: Negative.    Eyes: Negative.    Respiratory: Negative.    Cardiovascular: Negative.    Gastrointestinal: Negative.    Genitourinary: Negative.    Musculoskeletal: Negative.    Skin: Negative.    Neurological: Negative.    Endo/Heme/Allergies: Negative.    Psychiatric/Behavioral: Negative.           Objective:     /72   Wt 78.5 kg (173 lb)   LMP 2018 (Approximate)   BMI 24.82 kg/m²      Physical Exam   Constitutional:  She is oriented to person, place, and time. She appears well-developed and well-nourished.   HENT:   Head: Normocephalic and atraumatic.   Eyes: Pupils are equal, round, and reactive to light. EOM are normal.   Neck: Normal range of motion. Neck supple. No thyromegaly present.   Cardiovascular: Normal rate and regular rhythm.   Pulmonary/Chest: Effort normal and breath sounds normal. Right breast exhibits no inverted nipple, no mass, no nipple discharge, no skin change and no tenderness. Left breast exhibits no inverted nipple, no mass, no nipple discharge, no skin change and no tenderness. No breast swelling, tenderness, discharge or bleeding. Breasts are symmetrical.   Abdominal: Soft.   Genitourinary: Vagina normal and uterus normal. No labial fusion. There is no rash, tenderness, lesion or injury on the right labia. There is no rash, tenderness, lesion or injury on the left labia.   Genitourinary Comments: Lacc well healed with no stiches left    Musculoskeletal: Normal range of motion.   Neurological: She is alert and oriented to person, place, and time.   Skin: Skin is warm and dry.   Psychiatric: She has a normal mood and affect. Her behavior is normal. Judgment and thought content normal.               Assessment/Plan:     1. Postpartum care following vaginal delivery

## 2019-08-15 NOTE — LETTER
August 15, 2019      Gwendolyn Masters is being cared for by [facility]. She is now 8 weeks postpartum and is cleared to return to work.         Thank you,          SALVATORE Banuelos.    Electronically Signed

## 2019-08-26 ENCOUNTER — TELEPHONE (OUTPATIENT)
Dept: OBGYN | Facility: CLINIC | Age: 24
End: 2019-08-26

## 2019-08-26 NOTE — TELEPHONE ENCOUNTER
Pt called c/o lower back pain on L side, also was having intercourse and she felt a vaginal pain irradiating all the way to her leg. Wants to be seen.   Scheduled for GYN on 8/30/19 @ 0421

## 2019-08-30 ENCOUNTER — GYNECOLOGY VISIT (OUTPATIENT)
Dept: OBGYN | Facility: CLINIC | Age: 24
End: 2019-08-30
Payer: MEDICAID

## 2019-08-30 ENCOUNTER — HOSPITAL ENCOUNTER (OUTPATIENT)
Facility: MEDICAL CENTER | Age: 24
End: 2019-08-30
Attending: NURSE PRACTITIONER
Payer: MEDICAID

## 2019-08-30 DIAGNOSIS — R30.0 DYSURIA: ICD-10-CM

## 2019-08-30 DIAGNOSIS — N89.8 VAGINAL DISCHARGE: ICD-10-CM

## 2019-08-30 DIAGNOSIS — N76.0 BV (BACTERIAL VAGINOSIS): ICD-10-CM

## 2019-08-30 DIAGNOSIS — B96.89 BV (BACTERIAL VAGINOSIS): ICD-10-CM

## 2019-08-30 DIAGNOSIS — N39.0 URINARY TRACT INFECTION WITHOUT HEMATURIA, SITE UNSPECIFIED: Primary | ICD-10-CM

## 2019-08-30 LAB
APPEARANCE UR: NORMAL
BILIRUB UR STRIP-MCNC: NORMAL MG/DL
COLOR UR AUTO: NORMAL
GLUCOSE UR STRIP.AUTO-MCNC: NEGATIVE MG/DL
KETONES UR STRIP.AUTO-MCNC: NEGATIVE MG/DL
LEUKOCYTE ESTERASE UR QL STRIP.AUTO: NEGATIVE
NITRITE UR QL STRIP.AUTO: POSITIVE
PH UR STRIP.AUTO: 5.5 [PH] (ref 5–8)
PROT UR QL STRIP: NEGATIVE MG/DL
RBC UR QL AUTO: NEGATIVE
SP GR UR STRIP.AUTO: 1.02
UROBILINOGEN UR STRIP-MCNC: NORMAL MG/DL

## 2019-08-30 PROCEDURE — 81002 URINALYSIS NONAUTO W/O SCOPE: CPT | Performed by: NURSE PRACTITIONER

## 2019-08-30 PROCEDURE — 87591 N.GONORRHOEAE DNA AMP PROB: CPT

## 2019-08-30 PROCEDURE — 99395 PREV VISIT EST AGE 18-39: CPT | Performed by: NURSE PRACTITIONER

## 2019-08-30 PROCEDURE — 87491 CHLMYD TRACH DNA AMP PROBE: CPT

## 2019-08-30 RX ORDER — METRONIDAZOLE 500 MG/1
500 TABLET ORAL 2 TIMES DAILY
Qty: 14 TAB | Refills: 0 | Status: SHIPPED | OUTPATIENT
Start: 2019-08-30 | End: 2021-04-08

## 2019-08-30 RX ORDER — NITROFURANTOIN 25; 75 MG/1; MG/1
100 CAPSULE ORAL 2 TIMES DAILY
Qty: 14 CAP | Refills: 0 | Status: SHIPPED | OUTPATIENT
Start: 2019-08-30 | End: 2021-04-08

## 2019-08-30 ASSESSMENT — ENCOUNTER SYMPTOMS
MUSCULOSKELETAL NEGATIVE: 1
CONSTITUTIONAL NEGATIVE: 1
RESPIRATORY NEGATIVE: 1
CARDIOVASCULAR NEGATIVE: 1
NEUROLOGICAL NEGATIVE: 1
PSYCHIATRIC NEGATIVE: 1
GASTROINTESTINAL NEGATIVE: 1
EYES NEGATIVE: 1

## 2019-08-30 NOTE — PROGRESS NOTES
HPI Comments:  Gwendolyn Masters is a 23 y.o. y.o. female who presents for problem gyn visit: vaginal discharge and painful intercourse. She had unprotected intercourse with a partner and found out this partner has been sexually active with other partners.    She has had fishy smelling discharge and some difficulty with urination.     Review of Systems   Constitutional: Negative.    HENT: Negative.    Eyes: Negative.    Respiratory: Negative.    Cardiovascular: Negative.    Gastrointestinal: Negative.    Genitourinary: Positive for dysuria and frequency.   Musculoskeletal: Negative.    Skin: Negative.    Neurological: Negative.    Endo/Heme/Allergies: Negative.    Psychiatric/Behavioral: Negative.    All other systems reviewed and are negative.        All PMH, PSH, allergies, social history and FH reviewed and updated today:  No past medical history on file.  Past Surgical History:   Procedure Laterality Date   • LAPAROSCOPIC LYSIS OF ADHESIONS N/A 9/4/2016    Procedure: LAPAROSCOPIC LYSIS OF ADHESIONS;  Surgeon: Venecia Hawk M.D.;  Location: SURGERY [facility];  Service:    • APPENDECTOMY LAPAROSCOPIC  7/26/2016    Procedure: APPENDECTOMY LAPAROSCOPIC;  Surgeon: Joao Dorantes M.D.;  Location: SURGERY [facility];  Service:    • APPENDECTOMY     • OTHER ORTHOPEDIC SURGERY      meniscus repair - RIGHT     Erythromycin and Morphine  Social History     Socioeconomic History   • Marital status: Single     Spouse name: Not on file   • Number of children: Not on file   • Years of education: Not on file   • Highest education level: Not on file   Occupational History   • Not on file   Social Needs   • Financial resource strain: Not on file   • Food insecurity:     Worry: Not on file     Inability: Not on file   • Transportation needs:     Medical: Not on file     Non-medical: Not on file   Tobacco Use   • Smoking status: Former Smoker     Packs/day: 0.15     Years: 1.00     Pack years: 0.15     Types:  Cigarettes     Last attempt to quit: 2017     Years since quittin.2   • Smokeless tobacco: Never Used   Substance and Sexual Activity   • Alcohol use: No   • Drug use: No     Types: Marijuana   • Sexual activity: Not Currently     Partners: Male     Comment: Unplanned pregnancy. pt states was not on birth control   Lifestyle   • Physical activity:     Days per week: Not on file     Minutes per session: Not on file   • Stress: Not on file   Relationships   • Social connections:     Talks on phone: Not on file     Gets together: Not on file     Attends Islam service: Not on file     Active member of club or organization: Not on file     Attends meetings of clubs or organizations: Not on file     Relationship status: Not on file   • Intimate partner violence:     Fear of current or ex partner: Not on file     Emotionally abused: Not on file     Physically abused: Not on file     Forced sexual activity: Not on file   Other Topics Concern   • Not on file   Social History Narrative   • Not on file     Family History   Problem Relation Age of Onset   • Diabetes Paternal Grandmother      Medications:   Current Outpatient Medications Ordered in Epic   Medication Sig Dispense Refill   • Norgestim-Eth Estrad Triphasic 0.18/0.215/0.25 MG-25 MCG Tab Take 1 Tab by mouth every day. 28 Tab 12   • ibuprofen (MOTRIN) 800 MG Tab Take 1 Tab by mouth every 8 hours as needed (For cramping after delivery; do not give if patient is receiving ketorolac (Toradol)). (Patient not taking: Reported on 8/15/2019) 30 Tab 1   • ferrous sulfate 325 (65 Fe) MG tablet Take 1 Tab by mouth 2 times a day, with meals. (Patient not taking: Reported on 8/15/2019) 30 Tab 1   • PRENATAL VIT-DOCUSATE-IRON-FA PO Take  by mouth.       No current Epic-ordered facility-administered medications on file.           Objective:   Vital measurements:  There were no vitals taken for this visit.  There is no height or weight on file to calculate BMI. (Goal BM  I>18 <25)    Physical Exam   Nursing note and vitals reviewed.  Constitutional: She is oriented to person, place, and time. She appears well-developed and well-nourished. No distress.     Abdominal: Soft. Bowel sounds are normal. She exhibits no distension and no mass. No tenderness. She has no rebound and no guarding.     Genitourinary:  No CVA tenderness  Vagina with normal clear white discharge    Neurological: She is alert and oriented to person, place, and time. She exhibits normal muscle tone.     Skin: Skin is warm and dry. No rash noted. She is not diaphoretic. No erythema. No pallor.     Psychiatric: She has a normal mood and affect. Her behavior is normal. Judgment and thought content normal.     Urine dip +nitrites   +clue cells on microscopy  -trichomonas, -yeast  Assessment:     UTI  BV      Plan:   Discussed vaginal hygiene.  Discussed condom use for prevention of STI  macrobid 100mg PO BID x 7 day  Flagyl 500mg PO BID x 7 days  HIV/Hep B blood draw  RTC if no improvement  Discussed self care for yeast infection if develops from abx use.   No follow-ups on file.

## 2019-09-01 LAB
C TRACH DNA SPEC QL NAA+PROBE: NEGATIVE
N GONORRHOEA DNA SPEC QL NAA+PROBE: NEGATIVE
SPECIMEN SOURCE: NORMAL

## 2019-10-31 ENCOUNTER — APPOINTMENT (OUTPATIENT)
Dept: RADIOLOGY | Facility: MEDICAL CENTER | Age: 24
End: 2019-10-31
Attending: EMERGENCY MEDICINE
Payer: MEDICAID

## 2019-10-31 ENCOUNTER — HOSPITAL ENCOUNTER (EMERGENCY)
Facility: MEDICAL CENTER | Age: 24
End: 2019-10-31
Attending: EMERGENCY MEDICINE
Payer: MEDICAID

## 2019-10-31 VITALS
DIASTOLIC BLOOD PRESSURE: 68 MMHG | WEIGHT: 170.42 LBS | BODY MASS INDEX: 24.4 KG/M2 | TEMPERATURE: 97.2 F | OXYGEN SATURATION: 99 % | HEART RATE: 66 BPM | RESPIRATION RATE: 16 BRPM | HEIGHT: 70 IN | SYSTOLIC BLOOD PRESSURE: 118 MMHG

## 2019-10-31 DIAGNOSIS — R07.89 CHEST WALL PAIN: ICD-10-CM

## 2019-10-31 DIAGNOSIS — N12 PYELONEPHRITIS: ICD-10-CM

## 2019-10-31 LAB
ALBUMIN SERPL BCP-MCNC: 5.2 G/DL (ref 3.2–4.9)
ALBUMIN/GLOB SERPL: 1.9 G/DL
ALP SERPL-CCNC: 55 U/L (ref 30–99)
ALT SERPL-CCNC: 13 U/L (ref 2–50)
ANION GAP SERPL CALC-SCNC: 11 MMOL/L (ref 0–11.9)
APPEARANCE UR: CLEAR
AST SERPL-CCNC: 24 U/L (ref 12–45)
BACTERIA #/AREA URNS HPF: ABNORMAL /HPF
BASOPHILS # BLD AUTO: 1 % (ref 0–1.8)
BASOPHILS # BLD: 0.1 K/UL (ref 0–0.12)
BILIRUB SERPL-MCNC: 0.9 MG/DL (ref 0.1–1.5)
BILIRUB UR QL STRIP.AUTO: NEGATIVE
BUN SERPL-MCNC: 14 MG/DL (ref 8–22)
CALCIUM SERPL-MCNC: 9.5 MG/DL (ref 8.5–10.5)
CHLORIDE SERPL-SCNC: 104 MMOL/L (ref 96–112)
CO2 SERPL-SCNC: 20 MMOL/L (ref 20–33)
COLOR UR: YELLOW
CREAT SERPL-MCNC: 0.8 MG/DL (ref 0.5–1.4)
D DIMER PPP IA.FEU-MCNC: 0.44 UG/ML (FEU) (ref 0–0.5)
EKG IMPRESSION: NORMAL
EOSINOPHIL # BLD AUTO: 0.17 K/UL (ref 0–0.51)
EOSINOPHIL NFR BLD: 1.7 % (ref 0–6.9)
EPI CELLS #/AREA URNS HPF: ABNORMAL /HPF
ERYTHROCYTE [DISTWIDTH] IN BLOOD BY AUTOMATED COUNT: 49.7 FL (ref 35.9–50)
GLOBULIN SER CALC-MCNC: 2.8 G/DL (ref 1.9–3.5)
GLUCOSE SERPL-MCNC: 79 MG/DL (ref 65–99)
GLUCOSE UR STRIP.AUTO-MCNC: NEGATIVE MG/DL
HCG SERPL QL: NEGATIVE
HCG UR QL: NEGATIVE
HCT VFR BLD AUTO: 41.6 % (ref 37–47)
HGB BLD-MCNC: 13.1 G/DL (ref 12–16)
HYALINE CASTS #/AREA URNS LPF: ABNORMAL /LPF
IMM GRANULOCYTES # BLD AUTO: 0.02 K/UL (ref 0–0.11)
IMM GRANULOCYTES NFR BLD AUTO: 0.2 % (ref 0–0.9)
KETONES UR STRIP.AUTO-MCNC: 80 MG/DL
LEUKOCYTE ESTERASE UR QL STRIP.AUTO: ABNORMAL
LIPASE SERPL-CCNC: 28 U/L (ref 11–82)
LYMPHOCYTES # BLD AUTO: 3.32 K/UL (ref 1–4.8)
LYMPHOCYTES NFR BLD: 32.8 % (ref 22–41)
MCH RBC QN AUTO: 29.6 PG (ref 27–33)
MCHC RBC AUTO-ENTMCNC: 31.5 G/DL (ref 33.6–35)
MCV RBC AUTO: 93.9 FL (ref 81.4–97.8)
MICRO URNS: ABNORMAL
MONOCYTES # BLD AUTO: 1.08 K/UL (ref 0–0.85)
MONOCYTES NFR BLD AUTO: 10.7 % (ref 0–13.4)
NEUTROPHILS # BLD AUTO: 5.43 K/UL (ref 2–7.15)
NEUTROPHILS NFR BLD: 53.6 % (ref 44–72)
NITRITE UR QL STRIP.AUTO: POSITIVE
NRBC # BLD AUTO: 0 K/UL
NRBC BLD-RTO: 0 /100 WBC
PH UR STRIP.AUTO: 5 [PH] (ref 5–8)
PLATELET # BLD AUTO: 245 K/UL (ref 164–446)
PMV BLD AUTO: 11.7 FL (ref 9–12.9)
POTASSIUM SERPL-SCNC: 3.8 MMOL/L (ref 3.6–5.5)
PROT SERPL-MCNC: 8 G/DL (ref 6–8.2)
PROT UR QL STRIP: NEGATIVE MG/DL
RBC # BLD AUTO: 4.43 M/UL (ref 4.2–5.4)
RBC # URNS HPF: ABNORMAL /HPF
RBC UR QL AUTO: NEGATIVE
SODIUM SERPL-SCNC: 135 MMOL/L (ref 135–145)
SP GR UR REFRACTOMETRY: 1.02
SP GR UR STRIP.AUTO: 1.02
TROPONIN T SERPL-MCNC: <6 NG/L (ref 6–19)
UROBILINOGEN UR STRIP.AUTO-MCNC: 0.2 MG/DL
WBC # BLD AUTO: 10.1 K/UL (ref 4.8–10.8)
WBC #/AREA URNS HPF: ABNORMAL /HPF

## 2019-10-31 PROCEDURE — 81001 URINALYSIS AUTO W/SCOPE: CPT

## 2019-10-31 PROCEDURE — 71045 X-RAY EXAM CHEST 1 VIEW: CPT

## 2019-10-31 PROCEDURE — 700102 HCHG RX REV CODE 250 W/ 637 OVERRIDE(OP): Performed by: EMERGENCY MEDICINE

## 2019-10-31 PROCEDURE — 87086 URINE CULTURE/COLONY COUNT: CPT

## 2019-10-31 PROCEDURE — 700111 HCHG RX REV CODE 636 W/ 250 OVERRIDE (IP): Performed by: EMERGENCY MEDICINE

## 2019-10-31 PROCEDURE — 87077 CULTURE AEROBIC IDENTIFY: CPT

## 2019-10-31 PROCEDURE — 85379 FIBRIN DEGRADATION QUANT: CPT

## 2019-10-31 PROCEDURE — 84484 ASSAY OF TROPONIN QUANT: CPT

## 2019-10-31 PROCEDURE — 84703 CHORIONIC GONADOTROPIN ASSAY: CPT

## 2019-10-31 PROCEDURE — 99285 EMERGENCY DEPT VISIT HI MDM: CPT

## 2019-10-31 PROCEDURE — A9270 NON-COVERED ITEM OR SERVICE: HCPCS | Performed by: EMERGENCY MEDICINE

## 2019-10-31 PROCEDURE — 83690 ASSAY OF LIPASE: CPT

## 2019-10-31 PROCEDURE — 96374 THER/PROPH/DIAG INJ IV PUSH: CPT

## 2019-10-31 PROCEDURE — 80053 COMPREHEN METABOLIC PANEL: CPT

## 2019-10-31 PROCEDURE — 87186 SC STD MICRODIL/AGAR DIL: CPT

## 2019-10-31 PROCEDURE — 93005 ELECTROCARDIOGRAM TRACING: CPT | Performed by: EMERGENCY MEDICINE

## 2019-10-31 PROCEDURE — 85025 COMPLETE CBC W/AUTO DIFF WBC: CPT

## 2019-10-31 PROCEDURE — 81025 URINE PREGNANCY TEST: CPT

## 2019-10-31 RX ORDER — KETOROLAC TROMETHAMINE 30 MG/ML
15 INJECTION, SOLUTION INTRAMUSCULAR; INTRAVENOUS ONCE
Status: COMPLETED | OUTPATIENT
Start: 2019-10-31 | End: 2019-10-31

## 2019-10-31 RX ORDER — ACETAMINOPHEN 325 MG/1
650 TABLET ORAL ONCE
Status: COMPLETED | OUTPATIENT
Start: 2019-10-31 | End: 2019-10-31

## 2019-10-31 RX ORDER — CEFDINIR 300 MG/1
300 CAPSULE ORAL ONCE
Status: COMPLETED | OUTPATIENT
Start: 2019-10-31 | End: 2019-10-31

## 2019-10-31 RX ORDER — CEFDINIR 300 MG/1
300 CAPSULE ORAL 2 TIMES DAILY
Qty: 14 CAP | Refills: 0 | Status: SHIPPED | OUTPATIENT
Start: 2019-10-31 | End: 2019-11-07

## 2019-10-31 RX ADMIN — CEFDINIR 300 MG: 300 CAPSULE ORAL at 06:29

## 2019-10-31 RX ADMIN — KETOROLAC TROMETHAMINE 15 MG: 30 INJECTION, SOLUTION INTRAMUSCULAR; INTRAVENOUS at 06:28

## 2019-10-31 RX ADMIN — ACETAMINOPHEN 650 MG: 325 TABLET, FILM COATED ORAL at 04:30

## 2019-10-31 ASSESSMENT — PAIN DESCRIPTION - DESCRIPTORS: DESCRIPTORS: SHARP;SHOOTING

## 2019-10-31 NOTE — ED TRIAGE NOTES
.  Chief Complaint   Patient presents with   • Rib Pain      Pt ambulate to triage with above C/O. Pt reports sleeping and being awoken to sharp stabbing pain in ribs. Pt notes she is SOB due to pain. Pain is bilateral at level of diaphragm. Pt able to speak in full sentences and does not appear distressed. Pain 6/10.   [facility] will notify RN if condition changes.

## 2019-10-31 NOTE — ED PROVIDER NOTES
"ED Provider Note    Scribed for Javier George M.D. by Bo Serrano. 10/31/2019  3:55 AM    Primary care provider: Pcp Pt States None  Means of arrival: Walk in  History obtained from: Patient  History limited by: None    CHIEF COMPLAINT  Chief Complaint   Patient presents with   • Rib Pain       HPI  Gwendolyn Masters is a 24 y.o. female who presents to the Emergency Department for evaluation of acute chest wall pain localized to her bilateral mid ribs onset tonight, which awoke the patient from her sleep. She describes the pain as \"pulsing\" in quality. The patient endorses associated nausea and shortness of breath. She additionally complains of diffuse sharp abdominal pain, but no fever, vomiting, diarrhea, leg swelling, or rash. No alleviating or exacerbating factors are identified.     She is concerned as she has history of a kidney infection which was treated a few months ago, but states she has no dysuria currently. The patient reports no recent accident or injuries. She has not recently traveled outside the country. The patient has no prior cardiovascular history. She admits to marijuana use, but otherwise no recreational drugs. The patient states her last menstrual period was last month, but is unable to definitively rule out pregnancy at this time. She does not take any daily medications.  Intermittent flank pain described as achy.     REVIEW OF SYSTEMS  Pertinent positives include: chest wall pain, nausea, shortness of breath, and abdominal pain. Pertinent negatives include: fever, vomiting, diarrhea, leg swelling, or rash. See history of present illness. All other systems are negative.     PAST MEDICAL HISTORY   None noted    SURGICAL HISTORY   has a past surgical history that includes appendectomy laparoscopic (7/26/2016); laparoscopic lysis of adhesions (N/A, 9/4/2016); other orthopedic surgery; and appendectomy.    SOCIAL HISTORY  Social History     Tobacco Use   • Smoking status: Former Smoker     " "Packs/day: 0.15     Years: 1.00     Pack years: 0.15     Types: Cigarettes     Last attempt to quit: 2017     Years since quittin.4   • Smokeless tobacco: Never Used   Substance Use Topics   • Alcohol use: No   • Drug use: No     Types: Marijuana      Social History     Substance and Sexual Activity   Drug Use No   • Types: Marijuana       FAMILY HISTORY  Family History   Problem Relation Age of Onset   • Diabetes Paternal Grandmother        CURRENT MEDICATIONS  Home Medications     Reviewed by Charlotte See R.N. (Registered Nurse) on 10/31/19 at 0323  Med List Status: Not Addressed   Medication Last Dose Status   ferrous sulfate 325 (65 Fe) MG tablet  Active   ibuprofen (MOTRIN) 800 MG Tab  Active   metroNIDAZOLE (FLAGYL) 500 MG Tab  Active   nitrofurantoin monohyd macro (MACROBID) 100 MG Cap  Active   Norgestim-Eth Estrad Triphasic 0.18/0.215/0.25 MG-25 MCG Tab  Active   PRENATAL VIT-DOCUSATE-IRON-FA PO  Active                ALLERGIES  Allergies   Allergen Reactions   • Erythromycin Unspecified     \"Childhood allergie\".     • Morphine Hives       PHYSICAL EXAM  VITAL SIGNS: /75   Pulse (!) 101   Temp 36.2 °C (97.2 °F) (Temporal)   Resp 18   Ht 1.778 m (5' 10\")   Wt 77.3 kg (170 lb 6.7 oz)   SpO2 99%   BMI 24.45 kg/m²     Constitutional: Alert in no apparent distress.  HENT: No signs of trauma, Bilateral external ears normal, Nose normal. Uvula midline.   Eyes: Pupils are equal and reactive, Conjunctiva normal, Non-icteric.   Neck: Normal range of motion, No tenderness, Supple, No stridor.   Lymphatic: No lymphadenopathy noted.   Cardiovascular: Regular rate and rhythm, no murmurs.   Thorax & Lungs: Normal breath sounds, No respiratory distress, No wheezing, No chest tenderness.   Abdomen:  Soft, Mild tenderness in suprapubic area, No peritoneal signs, No masses, No pulsatile masses.   Skin: Warm, Dry, No erythema, No rash.   Back: No bony tenderness, No CVA tenderness.   Extremities: " Intact distal pulses, No edema, No tenderness, No cyanosis.  Musculoskeletal: Good range of motion in all major joints. No tenderness to palpation or major deformities noted.   Neurologic: Alert , Normal motor function, Normal sensory function, No focal deficits noted.   Psychiatric: Affect normal, Judgment normal, Mood normal.     DIAGNOSTIC STUDIES / PROCEDURES    LABS  Labs Reviewed   CBC WITH DIFFERENTIAL - Abnormal; Notable for the following components:       Result Value    MCHC 31.5 (*)     Monos (Absolute) 1.08 (*)     All other components within normal limits   COMP METABOLIC PANEL - Abnormal; Notable for the following components:    Albumin 5.2 (*)     All other components within normal limits   URINALYSIS,CULTURE IF INDICATED - Abnormal; Notable for the following components:    Ketones 80 (*)     Nitrite Positive (*)     Leukocyte Esterase Small (*)     All other components within normal limits   URINE MICROSCOPIC (W/UA) - Abnormal; Notable for the following components:    WBC 10-20 (*)     Bacteria Many (*)     Hyaline Cast 3-5 (*)     All other components within normal limits   HCG QUAL SERUM   TROPONIN   D-DIMER   LIPASE   ESTIMATED GFR   URINE CULTURE(NEW)   HCG QUALITATIVE UR   REFRACTOMETER SG      All labs reviewed by me.    EKG  12 Lead EKG interpreted by me to show:  Indication: Arrhythmia   Normal sinus rhythm  Rate 62  Axis: Normal  Intervals: Normal  Normal T waves  Normal ST segments  My impression of this EKG: No STEMI.     RADIOLOGY  DX-CHEST-PORTABLE (1 VIEW)   Final Result      Negative single view of the chest.        The radiologist's interpretation of all radiological studies have been reviewed by me.    COURSE & MEDICAL DECISION MAKING  Nursing notes, VS, PMSFHx reviewed in chart.    24 y.o. female p/w chief complaint of chest wall pain.    3:55 AM Patient seen and examined at bedside.      The differential diagnoses include but are not limited to:    #acute chest pain  CBC negative  for significant anemia/leukocytosis.  BMP negative for significant electrolyte abnormality.  Troponin/EKG (interpreted by me) without STEMI  HEART score= 0  Given symmetric pulses and no radiation to back and no hx of HTN doubt aortic dissection    Can not exclude gastroesophageal reflux disease, musculoskeletal chest pain, or peptic ulcer disease as possible dx however feel that pt is able to follow up with PCP for further work up.     Hx and CXR (interpreted by me) not suggestive of PNA, PTX.  low risk by Wells Criteria, neg ddimer doubt PE.    5:58 AM - Patient was reevaluated at bedside. She is resting comfortably in bed with stable vital signs. Discussed lab and radiology results with the patient which are negative for pneumonia, but does indicate pyelonephritis. The patient reports she was previously on Bactrim one month ago for pyelonephritis, therefore she will be discharged with a prescription for Cefdinir. The  will be contacted and the patient will be referred to Primary Care for follow up. The patient is understanding and agreeable to discharge.     The patient will return for new or worsening symptoms and is stable at the time of discharge.    DISPOSITION:  Patient will be discharged home in stable condition.    FOLLOW UP:  [facility], Emergency Dept  [address] [facility] [id number] [phone]    If symptoms worsen    [address] [address]  [phone]  In 2 days  Please call to schedule close follow up appointment with your regular doctor.      FINAL IMPRESSION  1. Chest wall pain    2. Pyelonephritis          Bo WHITAKER (Nay), am scribing for, and in the presence of, Javier George M.D..    Electronically signed by: Bo Serrano (Nay), 10/31/2019    Javier WHITAKER M.D. personally performed the services described in this documentation, as scribed by Bo Serrano in my presence, and it is both accurate and  complete.    C.    The note accurately reflects work and decisions made by me.  Javier George  10/31/2019  6:37 AM

## 2019-11-02 LAB
BACTERIA UR CULT: ABNORMAL
BACTERIA UR CULT: ABNORMAL
SIGNIFICANT IND 70042: ABNORMAL
SITE SITE: ABNORMAL
SOURCE SOURCE: ABNORMAL

## 2019-11-03 NOTE — ED NOTES
"ED Positive Culture Follow-up/Notification Note:    Date: 11/3/19     Patient seen in the ED on 10/31/2019 for rib pain, onset earlier that night which woke her up from sleep. The reported nausea, SOB, abdominal pain, and intermittent flank pain. The pt denied any fevers, vomiting, or diarrhea. Pt had no CVA tenderness on physical exam.     1. Chest wall pain    2. Pyelonephritis       Discharge Medication List as of 10/31/2019  6:05 AM      START taking these medications    Details   cefdinir (OMNICEF) 300 MG Cap Take 1 Cap by mouth 2 times a day for 7 days., Disp-14 Cap, R-0, Normal           Medications given in the ED:  -Acetaminophen 650mg PO once  -Cefdinir 300mg PO once  -Ketorolac 15mg IV once    Allergies: Erythromycin and Morphine     Vitals:    10/31/19 0316 10/31/19 0318 10/31/19 0515 10/31/19 0639   BP: 122/75   118/68   Pulse: (!) 101  63 66   Resp: 18   16   Temp: 36.2 °C (97.2 °F)   36.2 °C (97.2 °F)   TempSrc: Temporal   Temporal   SpO2: 99%  100% 99%   Weight:  77.3 kg (170 lb 6.7 oz)     Height: 1.778 m (5' 10\")          Final cultures:   Results     Procedure Component Value Units Date/Time    URINE CULTURE(NEW) [[id number]]  (Abnormal)  (Susceptibility) Collected:  10/31/19 0515    Order Status:  Completed Specimen:  Urine Updated:  11/02/19 0828     Significant Indicator POS     Source UR     Site -     Culture Result -      Escherichia coli  >100,000 cfu/mL      Susceptibility     Escherichia coli (1)     Antibiotic Interpretation Microscan Method Status    Ampicillin Sensitive <=8 mcg/mL GABE Final    Ceftriaxone Sensitive <=1 mcg/mL GABE Final    Ceftazidime Sensitive <=1 mcg/mL GABE Final    Cefotaxime Sensitive <=2 mcg/mL GABE Final    Cefazolin Sensitive <=2 mcg/mL GABE Final    Ciprofloxacin Sensitive <=1 mcg/mL GABE Final    Ampicillin/sulbactam Sensitive <=8/4 mcg/mL GABE Final    Cefepime Sensitive <=2 mcg/mL GABE Final    Tobramycin Sensitive <=4 mcg/mL GABE Final    Cefotetan Sensitive <=16 " mcg/mL GABE Final    Nitrofurantoin Sensitive <=32 mcg/mL GABE Final    Gentamicin Sensitive <=4 mcg/mL GABE Final    Levofloxacin Sensitive <=2 mcg/mL GABE Final    Pip/Tazobactam Sensitive <=16 mcg/mL GABE Final    Trimeth/Sulfa Sensitive <=2/38 mcg/mL GABE Final                   URINALYSIS,CULTURE IF INDICATED [[id number]]  (Abnormal) Collected:  10/31/19 0515    Order Status:  Completed Specimen:  Urine Updated:  10/31/19 0549     Color Yellow     Character Clear     Specific Gravity 1.024     Ph 5.0     Glucose Negative mg/dL      Ketones 80 mg/dL      Protein Negative mg/dL      Bilirubin Negative     Urobilinogen, Urine 0.2     Nitrite Positive     Leukocyte Esterase Small     Occult Blood Negative     Micro Urine Req Microscopic          Plan:   Organism is susceptible to the prescribed antibiotic. Attempted to call pt to inform her of results and verify that her symptoms have improved as cefdinir does not always achieve adequate levels in the kidney/urine, which can lead to therapeutic failures in some cases. Phone number is not currently in service.     The patient was instructed to f/u with PCP and to return to the ED for new or worsening symptoms per ED provider's note on ED discharge. No changes required.       Vishnu Mendoza, PharmD

## 2020-01-27 ENCOUNTER — INITIAL PRENATAL (OUTPATIENT)
Dept: OBGYN | Facility: CLINIC | Age: 25
End: 2020-01-27

## 2020-01-27 VITALS — WEIGHT: 173 LBS | DIASTOLIC BLOOD PRESSURE: 72 MMHG | SYSTOLIC BLOOD PRESSURE: 102 MMHG | BODY MASS INDEX: 24.82 KG/M2

## 2020-01-27 DIAGNOSIS — N93.8 DUB (DYSFUNCTIONAL UTERINE BLEEDING): ICD-10-CM

## 2020-01-27 LAB
INT CON NEG: NEGATIVE
INT CON POS: POSITIVE
POC URINE PREGNANCY TEST: POSITIVE

## 2020-01-27 PROCEDURE — 81025 URINE PREGNANCY TEST: CPT | Performed by: OBSTETRICS & GYNECOLOGY

## 2020-01-27 PROCEDURE — 99203 OFFICE O/P NEW LOW 30 MIN: CPT | Performed by: OBSTETRICS & GYNECOLOGY

## 2020-01-27 NOTE — PROGRESS NOTES
Cc: Confirmation of pregnancy    HPI:  The patient is a 24 y.o.  Unknown based upon  Patient's last menstrual period was 2019 (approximate).. She was using no birth control method. This was not a  planned pregnancy.    She presents for a confirmation of pregnancy.  She denies  fetal movement,  denies  vaginal bleeding,  denies  leakage of fluid,  denies contractions.   She denies nausea/vomiting, denies headache, and denies dysuria.      Review of systems:  Pertinent positives documented in HPI and all other systems reviewed & are negative    OB History    Para Term  AB Living   2 1 1     1   SAB TAB Ectopic Molar Multiple Live Births           0 1      # Outcome Date GA Lbr Rainer/2nd Weight Sex Delivery Anes PTL Lv   2 Current            1 Term 06/15/19 40w1d / 00:25 3.91 kg (8 lb 9.9 oz) F Vag-Spont EPI N CINDI     History reviewed. No pertinent past medical history.  Past Surgical History:   Procedure Laterality Date   • LAPAROSCOPIC LYSIS OF ADHESIONS N/A 2016    Procedure: LAPAROSCOPIC LYSIS OF ADHESIONS;  Surgeon: Venecia Hawk M.D.;  Location: SURGERY [facility];  Service:    • APPENDECTOMY LAPAROSCOPIC  2016    Procedure: APPENDECTOMY LAPAROSCOPIC;  Surgeon: Joao Dorantes M.D.;  Location: SURGERY [facility];  Service:    • APPENDECTOMY     • OTHER ORTHOPEDIC SURGERY      meniscus repair - RIGHT     Social History     Socioeconomic History   • Marital status: Single     Spouse name: Not on file   • Number of children: Not on file   • Years of education: Not on file   • Highest education level: Not on file   Occupational History   • Not on file   Social Needs   • Financial resource strain: Not on file   • Food insecurity:     Worry: Not on file     Inability: Not on file   • Transportation needs:     Medical: Not on file     Non-medical: Not on file   Tobacco Use   • Smoking status: Former Smoker     Packs/day: 0.15     Years: 1.00     Pack years: 0.15     Types:  Cigarettes     Last attempt to quit: 2017     Years since quittin.7   • Smokeless tobacco: Never Used   Substance and Sexual Activity   • Alcohol use: No   • Drug use: No     Types: Marijuana   • Sexual activity: Yes     Partners: Male     Birth control/protection: Condom Male, None   Lifestyle   • Physical activity:     Days per week: Not on file     Minutes per session: Not on file   • Stress: Not on file   Relationships   • Social connections:     Talks on phone: Not on file     Gets together: Not on file     Attends [employer]ical service: Not on file     Active member of club or organization: Not on file     Attends meetings of clubs or organizations: Not on file     Relationship status: Not on file   • Intimate partner violence:     Fear of current or ex partner: Not on file     Emotionally abused: Not on file     Physically abused: Not on file     Forced sexual activity: Not on file   Other Topics Concern   • Not on file   Social History Narrative   • Not on file     Family History   Problem Relation Age of Onset   • Diabetes Paternal Grandmother    • Cancer Paternal Grandmother         breast   • Cancer Paternal Aunt         ovarian     Allergies:   Allergies as of 2020 - Reviewed 2020   Allergen Reaction Noted   • Erythromycin Unspecified 2016   • Morphine Hives 2016       PE:    /72   Wt 78.5 kg (173 lb)       General:appears stated age, is in no apparent distress, is well developed and well nourished  Head: normocephalic, non-tender  Neck: neck is supple  Abdomen: Bowel sounds positive, nondistended, soft, nontender x4, no rebound or guarding. No organomegaly. No masses.  Female GYN: Deferred   Skin: No rashes, or ulcers or lesions seen  Psychiatric: Patient shows appropriate affect, is alert and oriented x3, intact judgment and insight.    Abdominal US performed and per my read:    Indication: Dating.     Findings: cueva intrauterine pregnancy @12 weeks and 4 days  weeks by CRL. Positive yolk sac. Positive fetal cardiac activity @140 BPM. Right ovary normal. Left Ovary normal. Cervical length 3.6 cm. No free fluid in the cul-de-sac.    Impression: viable IUP @12 weeks and 4 days. EDC by US of August 6, 2020      A/P:   1. DUB (dysfunctional uterine bleeding)  POCT Pregnancy       1. Spent 15 minutes in face-to-face patient contact in which greater than 50% of that visit was spent in counseling/coordination of care of newly diagnosed pregnancy including medical and surgical options of care.  2. 1st trimester screening for Down Syndrome and neural tube defects will be discussed at new obstetrical visit.  3.  SAB precautions discussed  4.  F/u in 2 weeks for new OB visit  5.  Increase water intake and encouraged healthy nutrition.    Patient had uncomplicated pregnancy.  Normal vaginal delivery.  Has no past medical history.    6.  Begin prenatal vitamins.    All questions answered    Final due date established by ultrasound today of August 6, 2020.

## 2020-01-27 NOTE — NON-PROVIDER
today  LMP early November   PNV yes  Phone # [phone]  Pharmacy verified with patient  C/o n/v, vomiting once a day. Some spotting last week but it was really light.

## 2020-01-30 ENCOUNTER — TELEPHONE (OUTPATIENT)
Dept: OBGYN | Facility: CLINIC | Age: 25
End: 2020-01-30

## 2020-01-30 NOTE — TELEPHONE ENCOUNTER
Pt called states she is getting an  and just want to confirmed that she needs to clarify that she is pregnant at the  center.  I explained to her that we don't know about their protocols, she will need to contact them.

## 2021-03-12 ENCOUNTER — GYNECOLOGY VISIT (OUTPATIENT)
Dept: OBGYN | Facility: CLINIC | Age: 26
End: 2021-03-12
Payer: MEDICAID

## 2021-03-12 DIAGNOSIS — N91.1 AMENORRHEA, SECONDARY: ICD-10-CM

## 2021-03-12 DIAGNOSIS — Z32.01 POSITIVE PREGNANCY TEST: ICD-10-CM

## 2021-03-12 DIAGNOSIS — N93.8 DUB (DYSFUNCTIONAL UTERINE BLEEDING): Primary | ICD-10-CM

## 2021-03-12 LAB
INT CON NEG: NORMAL
INT CON POS: NORMAL
POC URINE PREGNANCY TEST: POSITIVE

## 2021-03-12 PROCEDURE — 81025 URINE PREGNANCY TEST: CPT | Performed by: OBSTETRICS & GYNECOLOGY

## 2021-03-12 PROCEDURE — 76857 US EXAM PELVIC LIMITED: CPT | Performed by: OBSTETRICS & GYNECOLOGY

## 2021-03-12 PROCEDURE — 99213 OFFICE O/P EST LOW 20 MIN: CPT | Mod: 25 | Performed by: OBSTETRICS & GYNECOLOGY

## 2021-03-12 RX ORDER — PNV NO.95/FERROUS FUM/FOLIC AC 28MG-0.8MG
1 TABLET ORAL DAILY
Qty: 30 TABLET | Refills: 5 | Status: SHIPPED | OUTPATIENT
Start: 2021-03-12

## 2021-03-12 NOTE — NON-PROVIDER
Pt here for DUB visit  # [phone]  First prenatal care  Pt states no complaints  LMP unknown  Pharmacy verified.

## 2021-03-12 NOTE — PROGRESS NOTES
Subjective:      Gwendolyn Masters is a 25 y.o. female who presents as New Patient (DUB)            HPI patient is a 25-year-old -0-1-1 with unknown last menstrual period who presents today with amenorrhea and positive home pregnancy test.  Pregnancy was unplanned and was on desired initially but now which pregnancy is desired.  Patient states she went somewhere for an  but was informed that she is too far along in the gestation.  She started prenatal vitamins.  She reports no symptoms currently.  She reports positive flutter    ROS all organ systems are reviewed and are currently negative     Objective:     VSS, AF    Physical Exam  Vitals and nursing note reviewed. Exam conducted with a chaperone present.   Constitutional:       General: She is not in acute distress.     Appearance: Normal appearance. She is normal weight. She is not toxic-appearing.   HENT:      Head: Normocephalic and atraumatic.   Eyes:      General:         Right eye: No discharge.         Left eye: No discharge.      Conjunctiva/sclera: Conjunctivae normal.   Cardiovascular:      Rate and Rhythm: Normal rate and regular rhythm.      Pulses: Normal pulses.      Heart sounds: Normal heart sounds. No murmur. No gallop.    Pulmonary:      Effort: Pulmonary effort is normal. No respiratory distress.      Breath sounds: Normal breath sounds. No wheezing or rales.   Abdominal:      General: Abdomen is flat. Bowel sounds are normal. There is no distension.      Palpations: Abdomen is soft.      Tenderness: There is no abdominal tenderness. There is no guarding.   Musculoskeletal:         General: Normal range of motion.      Cervical back: Normal range of motion and neck supple.      Right lower leg: No edema.      Left lower leg: No edema.   Skin:     General: Skin is warm and dry.      Coloration: Skin is not jaundiced.   Neurological:      General: No focal deficit present.      Mental Status: She is alert and oriented to person,  place, and time.      Motor: No weakness.      Gait: Gait normal.   Psychiatric:         Mood and Affect: Mood normal.         Behavior: Behavior normal.         Thought Content: Thought content normal.         Judgment: Judgment normal.          Transabdominal ultrasound was performed and read by me  Fundal height was 18 weeks size    Geiger intrauterine pregnancy in variable presentation noted  Fetal biometry measurements give a gestational age of 18 weeks and 0-day  Fetal heart rate was 150 bpm  Amniotic fluid appears subjectively normal  Posterior placenta  EDC by fetal biometry is 8/13/2021    Impression: Geiger intrauterine pregnancy at 18 weeks and 0-day gestation with EDC of 8/13/2021       Assessment/Plan:            1. Amenorrhea, secondary  25-year-old multiparous female presenting with positive pregnancy test and amenorrhea.  Normal intrauterine pregnancy confirmed today at 18 weeks gestation.  Findings were discussed  Pregnancy precautions and restrictions were reviewed  Patient to follow-up next week for new OB exam  - Prenatal Vit-Fe Fumarate-FA (PRENATAL VITAMIN) 27-0.8 MG Tab; Take 1 tablet by mouth every day.  Dispense: 30 tablet; Refill: 5    2. Positive pregnancy test    - Prenatal Vit-Fe Fumarate-FA (PRENATAL VITAMIN) 27-0.8 MG Tab; Take 1 tablet by mouth every day.  Dispense: 30 tablet; Refill: 5

## 2021-04-12 PROBLEM — Z34.80 SUPERVISION OF OTHER NORMAL PREGNANCY, ANTEPARTUM: Status: ACTIVE | Noted: 2021-04-12

## 2021-04-22 ENCOUNTER — APPOINTMENT (OUTPATIENT)
Dept: OBGYN | Facility: CLINIC | Age: 26
End: 2021-04-22
Payer: MEDICAID

## 2021-04-22 ENCOUNTER — HOSPITAL ENCOUNTER (OUTPATIENT)
Facility: MEDICAL CENTER | Age: 26
End: 2021-04-22
Attending: NURSE PRACTITIONER
Payer: MEDICAID

## 2021-04-22 ENCOUNTER — INITIAL PRENATAL (OUTPATIENT)
Dept: OBGYN | Facility: CLINIC | Age: 26
End: 2021-04-22
Payer: MEDICAID

## 2021-04-22 VITALS
HEIGHT: 70 IN | DIASTOLIC BLOOD PRESSURE: 80 MMHG | SYSTOLIC BLOOD PRESSURE: 120 MMHG | BODY MASS INDEX: 27.6 KG/M2 | WEIGHT: 192.8 LBS

## 2021-04-22 DIAGNOSIS — Z34.82 ENCOUNTER FOR SUPERVISION OF OTHER NORMAL PREGNANCY IN SECOND TRIMESTER: ICD-10-CM

## 2021-04-22 DIAGNOSIS — Z34.82 ENCOUNTER FOR SUPERVISION OF OTHER NORMAL PREGNANCY IN SECOND TRIMESTER: Primary | ICD-10-CM

## 2021-04-22 PROCEDURE — 87591 N.GONORRHOEAE DNA AMP PROB: CPT

## 2021-04-22 PROCEDURE — 87491 CHLMYD TRACH DNA AMP PROBE: CPT

## 2021-04-22 PROCEDURE — 0500F INITIAL PRENATAL CARE VISIT: CPT | Performed by: NURSE PRACTITIONER

## 2021-04-22 ASSESSMENT — ENCOUNTER SYMPTOMS
PSYCHIATRIC NEGATIVE: 1
CARDIOVASCULAR NEGATIVE: 1
EYES NEGATIVE: 1
RESPIRATORY NEGATIVE: 1
GASTROINTESTINAL NEGATIVE: 1
NEUROLOGICAL NEGATIVE: 1
CONSTITUTIONAL NEGATIVE: 1
MUSCULOSKELETAL NEGATIVE: 1

## 2021-04-22 ASSESSMENT — FIBROSIS 4 INDEX: FIB4 SCORE: 0.68

## 2021-04-22 NOTE — PROGRESS NOTES
NOB today  LMP: Unknown   Last pap: N/a  Phone # [phone]  Pharmacy confirmed  On PNV Yes  Cystic Fibrosis test offered. Declined   No complaints as of today

## 2021-04-22 NOTE — PROGRESS NOTES
"Subjective:      S:  Gwendolyn Masters is a 25 y.o.  female  @ She is 23w6d with an JETT of Estimated Date of Delivery: 21 based off of US who presents for her new OB exam.      Her OB hx includes  x1 at term, TAB x1.   History of HSV I or II in self or partner: no  History of STIs in self or partner: no  History of Thyroid problems: no      No ER visits or previous care in this pregnancy. She has no complaints.  Desires AFP.  Declines CF.  Reports positive FM, no VB, LOF, or cramping.  Denies dysuria, vaginal DC.  Pt is single and lives by herself. FOB is involved. She is currently working as , no heavy lifting, no chemical exposure.  Pregnancy was unplanned and initially undesired. Pt resourced  options but was told she was too far along. She feels she has now accepted the pregnancy. She is currently in the process of moving into a bigger place to accommodate the baby.  .   O:    Vitals:    21 0937   BP: 120/80   Weight: 87.5 kg (192 lb 12.8 oz)   Height: 1.778 m (5' 10\")    See H&P Prenatal Physical.  Wet mount: not indicated        FHTs: 145        Fundal ht: 24cm     A:   1.  IUP @ 23w6d JETT: Estimated Date of Delivery: 21 per US          2.  S=D        3.    Patient Active Problem List    Diagnosis Date Noted   • Supervision of other normal pregnancy 2021         P:  1.  GC/CT today. Pap negative in 2019.         2.  Prenatal labs and UDS ordered - lab slip given        3.  Discussed diet and exercise during pregnancy. Encouraged good nutrition, and daily exercise including walking or swimming. Discussed expected weight gain during pregnancy.              4.  Discussed adequate hydration during pregnancy.        5.  NOB packet given        6.  Return to office in 4 wks        7.  Complete OB US at next availability        8.  Pregnancy guide provided        9.  Not a candidate for Centering Pregnancy    HPI    Review of Systems   Constitutional: " "Negative.    HENT: Negative.    Eyes: Negative.    Respiratory: Negative.    Cardiovascular: Negative.    Gastrointestinal: Negative.    Genitourinary: Negative.    Musculoskeletal: Negative.    Skin: Negative.    Neurological: Negative.    Endo/Heme/Allergies: Negative.    Psychiatric/Behavioral: Negative.           Objective:     /80   Ht 1.778 m (5' 10\")   Wt 87.5 kg (192 lb 12.8 oz)   BMI 27.66 kg/m²      Physical Exam  Vitals and nursing note reviewed.   Constitutional:       Appearance: She is well-developed.   Cardiovascular:      Rate and Rhythm: Normal rate and regular rhythm.      Heart sounds: Normal heart sounds.   Pulmonary:      Effort: Pulmonary effort is normal.      Breath sounds: Normal breath sounds.   Abdominal:      Palpations: Abdomen is soft.   Genitourinary:     Vagina: Normal.      Comments: Uterus enlarged, c/w 24 wk ga  Musculoskeletal:         General: Normal range of motion.      Cervical back: Normal range of motion and neck supple.   Skin:     General: Skin is warm and dry.   Neurological:      Mental Status: She is alert and oriented to person, place, and time.      Deep Tendon Reflexes: Reflexes are normal and symmetric.   Psychiatric:         Behavior: Behavior normal.         Thought Content: Thought content normal.         Judgment: Judgment normal.                 Assessment/Plan:        1. Encounter for supervision of other normal pregnancy in second trimester    - PRENATAL PANEL 3+HIV+HCV; Future  - URINE DRUG SCREEN W/CONF (AR); Future  - US-OB 2ND 3RD TRI COMPLETE; Future  - URINE CULTURE(NEW); Future  - CHLAMYDIA/GC PCR URINE OR SWAB; Future  - AFP TETRA; Future    "

## 2021-05-04 ENCOUNTER — HOSPITAL ENCOUNTER (OUTPATIENT)
Dept: LAB | Facility: MEDICAL CENTER | Age: 26
End: 2021-05-04
Attending: NURSE PRACTITIONER
Payer: MEDICAID

## 2021-05-04 ENCOUNTER — APPOINTMENT (OUTPATIENT)
Dept: RADIOLOGY | Facility: IMAGING CENTER | Age: 26
End: 2021-05-04
Payer: MEDICAID

## 2021-05-04 DIAGNOSIS — Z34.82 ENCOUNTER FOR SUPERVISION OF OTHER NORMAL PREGNANCY IN SECOND TRIMESTER: ICD-10-CM

## 2021-05-04 LAB
ABO GROUP BLD: NORMAL
BASOPHILS # BLD AUTO: 0.7 % (ref 0–1.8)
BASOPHILS # BLD: 0.06 K/UL (ref 0–0.12)
BLD GP AB SCN SERPL QL: NORMAL
EOSINOPHIL # BLD AUTO: 0.13 K/UL (ref 0–0.51)
EOSINOPHIL NFR BLD: 1.5 % (ref 0–6.9)
ERYTHROCYTE [DISTWIDTH] IN BLOOD BY AUTOMATED COUNT: 51.4 FL (ref 35.9–50)
HBV SURFACE AG SER QL: ABNORMAL
HCT VFR BLD AUTO: 35.7 % (ref 37–47)
HCV AB SER QL: ABNORMAL
HGB BLD-MCNC: 11.5 G/DL (ref 12–16)
HIV 1+2 AB+HIV1 P24 AG SERPL QL IA: NORMAL
IMM GRANULOCYTES # BLD AUTO: 0.06 K/UL (ref 0–0.11)
IMM GRANULOCYTES NFR BLD AUTO: 0.7 % (ref 0–0.9)
LYMPHOCYTES # BLD AUTO: 1.71 K/UL (ref 1–4.8)
LYMPHOCYTES NFR BLD: 19.3 % (ref 22–41)
MCH RBC QN AUTO: 32.1 PG (ref 27–33)
MCHC RBC AUTO-ENTMCNC: 32.2 G/DL (ref 33.6–35)
MCV RBC AUTO: 99.7 FL (ref 81.4–97.8)
MONOCYTES # BLD AUTO: 0.88 K/UL (ref 0–0.85)
MONOCYTES NFR BLD AUTO: 9.9 % (ref 0–13.4)
NEUTROPHILS # BLD AUTO: 6.01 K/UL (ref 2–7.15)
NEUTROPHILS NFR BLD: 67.9 % (ref 44–72)
NRBC # BLD AUTO: 0 K/UL
NRBC BLD-RTO: 0 /100 WBC
PLATELET # BLD AUTO: 194 K/UL (ref 164–446)
PMV BLD AUTO: 12.1 FL (ref 9–12.9)
RBC # BLD AUTO: 3.58 M/UL (ref 4.2–5.4)
RH BLD: NORMAL
RUBV AB SER QL: 184 IU/ML
TREPONEMA PALLIDUM IGG+IGM AB [PRESENCE] IN SERUM OR PLASMA BY IMMUNOASSAY: ABNORMAL
WBC # BLD AUTO: 8.9 K/UL (ref 4.8–10.8)

## 2021-05-04 PROCEDURE — 80307 DRUG TEST PRSMV CHEM ANLYZR: CPT

## 2021-05-04 PROCEDURE — 86780 TREPONEMA PALLIDUM: CPT

## 2021-05-04 PROCEDURE — 87186 SC STD MICRODIL/AGAR DIL: CPT

## 2021-05-04 PROCEDURE — 86850 RBC ANTIBODY SCREEN: CPT

## 2021-05-04 PROCEDURE — 81511 FTL CGEN ABNOR FOUR ANAL: CPT

## 2021-05-04 PROCEDURE — 86901 BLOOD TYPING SEROLOGIC RH(D): CPT

## 2021-05-04 PROCEDURE — 86803 HEPATITIS C AB TEST: CPT

## 2021-05-04 PROCEDURE — 87077 CULTURE AEROBIC IDENTIFY: CPT

## 2021-05-04 PROCEDURE — 86900 BLOOD TYPING SEROLOGIC ABO: CPT

## 2021-05-04 PROCEDURE — 76805 OB US >/= 14 WKS SNGL FETUS: CPT | Mod: TC | Performed by: NURSE PRACTITIONER

## 2021-05-04 PROCEDURE — 87086 URINE CULTURE/COLONY COUNT: CPT

## 2021-05-04 PROCEDURE — 87340 HEPATITIS B SURFACE AG IA: CPT

## 2021-05-04 PROCEDURE — 36415 COLL VENOUS BLD VENIPUNCTURE: CPT

## 2021-05-04 PROCEDURE — 86762 RUBELLA ANTIBODY: CPT

## 2021-05-04 PROCEDURE — 85025 COMPLETE CBC W/AUTO DIFF WBC: CPT

## 2021-05-04 PROCEDURE — 87389 HIV-1 AG W/HIV-1&-2 AB AG IA: CPT

## 2021-05-06 LAB
AMPHET CTO UR CFM-MCNC: NEGATIVE NG/ML
BACTERIA UR CULT: ABNORMAL
BACTERIA UR CULT: ABNORMAL
BARBITURATES CTO UR CFM-MCNC: NEGATIVE NG/ML
BENZODIAZ CTO UR CFM-MCNC: NEGATIVE NG/ML
CANNABINOIDS CTO UR CFM-MCNC: POSITIVE NG/ML
COCAINE CTO UR CFM-MCNC: NEGATIVE NG/ML
DRUG COMMENT 753798: NORMAL
METHADONE CTO UR CFM-MCNC: NEGATIVE NG/ML
OPIATES CTO UR CFM-MCNC: NEGATIVE NG/ML
PCP CTO UR CFM-MCNC: NEGATIVE NG/ML
PROPOXYPH CTO UR CFM-MCNC: NEGATIVE NG/ML
SIGNIFICANT IND 70042: ABNORMAL
SITE SITE: ABNORMAL
SOURCE SOURCE: ABNORMAL

## 2021-05-07 ENCOUNTER — TELEPHONE (OUTPATIENT)
Dept: OBGYN | Facility: CLINIC | Age: 26
End: 2021-05-07

## 2021-05-07 LAB — THC UR CFM-MCNC: >500 NG/ML

## 2021-05-07 RX ORDER — NITROFURANTOIN 25; 75 MG/1; MG/1
100 CAPSULE ORAL 2 TIMES DAILY
Qty: 14 CAPSULE | Refills: 0 | Status: SHIPPED | OUTPATIENT
Start: 2021-05-07 | End: 2021-05-07

## 2021-05-07 RX ORDER — NITROFURANTOIN 25; 75 MG/1; MG/1
100 CAPSULE ORAL 2 TIMES DAILY
Qty: 14 CAPSULE | Refills: 0 | Status: SHIPPED | OUTPATIENT
Start: 2021-05-07 | End: 2021-07-12

## 2021-05-07 NOTE — TELEPHONE ENCOUNTER
----- Message from KAYLEE Chirinos sent at 5/5/2021  2:02 PM PDT -----  +UTI - await culture    UA Cx results are back. Consulted with Madonna Shen CNM and advised to ask pt if she has s/s of UTI if no symptoms no need to Tx base on UA Cx.     Called pt and asked her if she had pain/burning with urination, urgency, or if she feels liek she is not emptying her bladder completely. Pt stated at time base on how much water she drink and stated she was wondering if she had an infection but since symptoms come and go was not sure. Pt stated this week she has been having more symptoms than usual. Debbie Tovar CNM (aware of Madonna Shen CNM concerns) notified and agreed to send macrobid to pt's pharmacy. Pt notified and advised to finish Tx and increase water intake. Pt agreed and verbalized understanding.

## 2021-05-10 DIAGNOSIS — O28.3 ABNORMAL PREGNANCY US: ICD-10-CM

## 2021-05-11 LAB
# FETUSES US: NORMAL
AFP MOM SERPL: NORMAL
AFP SERPL-MCNC: 65 NG/ML
AGE - REPORTED: 25.9 YR
CURRENT SMOKER: NO
FAMILY MEMBER DISEASES HX: NO
GA METHOD: NORMAL
GA: NORMAL WK
HCG MOM SERPL: NORMAL
HCG SERPL-ACNC: NORMAL IU/L
HX OF HEREDITARY DISORDERS: NO
IDDM PATIENT QL: NO
INHIBIN A MOM SERPL: NORMAL
INHIBIN A SERPL-MCNC: 462 PG/ML
INTEGRATED SCN PATIENT-IMP: NORMAL
PATHOLOGY STUDY: NORMAL
SPECIMEN DRAWN SERPL: NORMAL
U ESTRIOL MOM SERPL: NORMAL
U ESTRIOL SERPL-MCNC: 3.83 NG/ML

## 2021-05-20 ENCOUNTER — ROUTINE PRENATAL (OUTPATIENT)
Dept: OBGYN | Facility: CLINIC | Age: 26
End: 2021-05-20
Payer: MEDICAID

## 2021-05-20 VITALS — SYSTOLIC BLOOD PRESSURE: 110 MMHG | DIASTOLIC BLOOD PRESSURE: 60 MMHG | WEIGHT: 194.2 LBS | BODY MASS INDEX: 27.86 KG/M2

## 2021-05-20 DIAGNOSIS — O28.3 ABNORMAL PREGNANCY US: ICD-10-CM

## 2021-05-20 DIAGNOSIS — F32.A DEPRESSION DURING PREGNANCY, ANTEPARTUM: ICD-10-CM

## 2021-05-20 DIAGNOSIS — O09.92 ENCOUNTER FOR SUPERVISION OF HIGH RISK PREGNANCY IN SECOND TRIMESTER, ANTEPARTUM: Primary | ICD-10-CM

## 2021-05-20 DIAGNOSIS — O99.340 DEPRESSION DURING PREGNANCY, ANTEPARTUM: ICD-10-CM

## 2021-05-20 PROCEDURE — 90040 PR PRENATAL FOLLOW UP: CPT | Performed by: NURSE PRACTITIONER

## 2021-05-20 ASSESSMENT — FIBROSIS 4 INDEX: FIB4 SCORE: 0.86

## 2021-05-20 NOTE — PROGRESS NOTES
S:  Pt is  at 27w6d for routine OB follow up.  Pt unaware of abnormal US, information for Dr. Guerrero's office given, pt to make an appt. We also discussed her EPDS score of 15. She feels she has her emotions under control, does not feel she wants to harm herself, does have times when life events upset her. She is agreeable to a referral to .  No ED or hospital visits since last seen. Reports good FM.  Denies VB, LOF, RUCs or vaginal DC.    O:  S>D            A:  IUP at 27w6d  Patient Active Problem List    Diagnosis Date Noted   • Abnormal pregnancy US 2021   • Depression during pregnancy 2021   • Supervision of other normal pregnancy 2021        P:  1.  Wants to consider BTL.          2.  Instructions given on FKCs.          3.  Questions answered.          4.  Encouraged pt to tour L&D.          5.  Encourage adequate water intake.        6.   labor precautions reviewed.         7.  F/u 2 wks.        8.  TDAP: wants to consider.        9.  Referral to

## 2021-05-20 NOTE — LETTER
"Count Your Baby's Movements  Another step to a healthy delivery    Gwendolyn Masters              Dept: [phone]    How Many Weeks Pregnant? 27W6D    Date to Begin Countin2021              How to use this chart    One way for your physician to keep track of your baby's health is by knowing how often the baby moves (or \"kicks\") in your womb.  You can help your physician to do this by using this chart every day.    Every day, you should see how many hours it takes for your baby to move 10 times.  Start in the morning, as soon as you get up.    · First, write down the time your baby moves until you get to 10.  · Check off one box every time your baby moves until you get to 10.  · Write down the time you finished counting in the last column.  · Total how long it took to count up all 10 movements.  · Finally, fill in the box that shows how long this took.  After counting 10 movements, you no longer have to count any more that day.  The next morning, just start counting again as soon as you get up.    What should you call a \"movement\"?  It is hard to say, because it will feel different from one mother to another and from one pregnancy to the next.  The important thing is that you count the movements the same way throughout your pregnancy.  If you have more questions, you should ask your physician.    Count carefully every day!  SAMPLE:  Week 28    How many hours did it take to feel 10 movements?       Start  Time     1     2     3     4     5     6     7     8     9     10   Finish Time   Mon 8:20 ·  ·  ·  ·  ·  ·  ·  ·  ·  ·  11:40                  Sat               Sun                 IMPORTANT: You should contact your physician if it takes more than two hours for you to feel 10 movements.  Each morning, write down the time and start to count the movements of your baby.  Keep track by checking off one box every time you feel one movement.  When you have " "felt 10 \"kicks\", write down the time you finished counting in the last column.  Then fill in the   box (over the check pipo) for the number of hours it took.  Be sure to read the complete instructions on the previous page.            "

## 2021-05-20 NOTE — PROGRESS NOTES
OB follow up   + fetal movement. Active  No VB, LOF or UC's.  Phone # [phone]  Preferred pharmacy confirmed.  No complaints as of today   TDAP ask next appt   BTL Ask next appt   Kick count sheet given today.

## 2021-06-08 ENCOUNTER — TELEPHONE (OUTPATIENT)
Dept: OBGYN | Facility: CLINIC | Age: 26
End: 2021-06-08

## 2021-06-15 ENCOUNTER — ROUTINE PRENATAL (OUTPATIENT)
Dept: OBGYN | Facility: CLINIC | Age: 26
End: 2021-06-15
Payer: MEDICAID

## 2021-06-15 VITALS — BODY MASS INDEX: 28.04 KG/M2 | WEIGHT: 195.4 LBS | DIASTOLIC BLOOD PRESSURE: 62 MMHG | SYSTOLIC BLOOD PRESSURE: 106 MMHG

## 2021-06-15 DIAGNOSIS — O40.3XX0 POLYHYDRAMNIOS IN THIRD TRIMESTER COMPLICATION, SINGLE OR UNSPECIFIED FETUS: ICD-10-CM

## 2021-06-15 DIAGNOSIS — O09.93 ENCOUNTER FOR SUPERVISION OF HIGH RISK PREGNANCY IN THIRD TRIMESTER, ANTEPARTUM: Primary | ICD-10-CM

## 2021-06-15 PROBLEM — O40.9XX0 POLYHYDRAMNIOS: Status: ACTIVE | Noted: 2021-06-15

## 2021-06-15 PROCEDURE — 90040 PR PRENATAL FOLLOW UP: CPT | Performed by: NURSE PRACTITIONER

## 2021-06-15 PROCEDURE — 90471 IMMUNIZATION ADMIN: CPT | Performed by: NURSE PRACTITIONER

## 2021-06-15 PROCEDURE — 90715 TDAP VACCINE 7 YRS/> IM: CPT | Performed by: NURSE PRACTITIONER

## 2021-06-15 ASSESSMENT — FIBROSIS 4 INDEX: FIB4 SCORE: 0.86

## 2021-06-15 NOTE — PROGRESS NOTES
OB follow up   + fetal movement. Active  No VB, LOF or UC's.  Phone # [phone]  Preferred pharmacy confirmed.  No complaints as of today  TDAP Today   BTL Declined     NDC: 32036-813-61  LOT#: [id number]  Expiration Date: 2023  Dose: 0.5mL  Site: Left Deltoid  Patient educated on use and side effects of medication. Name and  verified prior to injection. Pt tolerated? Well   Verified by KDLT  Administered by Elsa Guzman, Med Ass't at 11:00 AM.  Patient Provided Medication: no
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
## 2021-06-19 ENCOUNTER — HOSPITAL ENCOUNTER (EMERGENCY)
Facility: MEDICAL CENTER | Age: 26
End: 2021-06-19
Attending: OBSTETRICS & GYNECOLOGY | Admitting: OBSTETRICS & GYNECOLOGY
Payer: MEDICAID

## 2021-06-19 VITALS
HEIGHT: 69 IN | TEMPERATURE: 97.2 F | HEART RATE: 86 BPM | WEIGHT: 195 LBS | RESPIRATION RATE: 16 BRPM | OXYGEN SATURATION: 95 % | DIASTOLIC BLOOD PRESSURE: 60 MMHG | BODY MASS INDEX: 28.88 KG/M2 | SYSTOLIC BLOOD PRESSURE: 102 MMHG

## 2021-06-19 LAB
A1 MICROGLOB PLACENTAL VAG QL: NEGATIVE
A1 MICROGLOB PLACENTAL VAG QL: NEGATIVE
APPEARANCE UR: CLEAR
COLOR UR AUTO: YELLOW
GLUCOSE UR QL STRIP.AUTO: NEGATIVE MG/DL
KETONES UR QL STRIP.AUTO: NEGATIVE MG/DL
LEUKOCYTE ESTERASE UR QL STRIP.AUTO: NEGATIVE
NITRITE UR QL STRIP.AUTO: POSITIVE
PH UR STRIP.AUTO: 5.5 [PH] (ref 5–8)
PROT UR QL STRIP: NEGATIVE MG/DL
RBC UR QL AUTO: NEGATIVE
SP GR UR STRIP.AUTO: 1.01 (ref 1–1.03)

## 2021-06-19 PROCEDURE — 84112 EVAL AMNIOTIC FLUID PROTEIN: CPT

## 2021-06-19 PROCEDURE — 99284 EMERGENCY DEPT VISIT MOD MDM: CPT

## 2021-06-19 PROCEDURE — 59025 FETAL NON-STRESS TEST: CPT

## 2021-06-19 PROCEDURE — 81002 URINALYSIS NONAUTO W/O SCOPE: CPT

## 2021-06-19 ASSESSMENT — FIBROSIS 4 INDEX: FIB4 SCORE: 0.86

## 2021-06-20 NOTE — PROGRESS NOTES
- pt is a , 32.1 weeks gestation IUP, poly, with c/o lof since . No c/o uc's, bleeding or dfm. efm and toco placed, vss. Cara ROSAS updated, received orders for amnisure. SSE performed, no pooling noted, amnisure gathered, sent to lab.  - Cara ROSAS at bedside discussing poc for discharge  - addressed discharge instructions with PTL precautions, all questions answered, verbalized understanding. Left off floor stable, with friend at side.

## 2021-06-20 NOTE — DISCHARGE INSTRUCTIONS
General Instructions:  · If you think you are in labor, time contractions (lying on your left side) from the beginning of one contraction to the beginning of the next contraction for at least one hour.  · Increase fluid intake: you should consume 10-12 8 oz glasses of non-caffeinated fluid per day.  · Report any pressure or burning on urination to your physician.  · Monitor fetal movement: If you notice an absence or decrease in fetal movement, drink a large glass of water and rest on your side.  If there is no increase in movement, call your physician or go to the hospital for further evaluation.  · Report any sudden, sharp abdominal pain.  · Report any bleeding.  Spotting or pinkish discharge is normal after vaginal exam.  You may also spot after sexual intercourse.    Pre-term Labor (<37 weeks):  Call your physician or return to the hospital if:  · You have painless regular contractions more than 4 in one hour.  · Your water breaks (remember time and color).  · You have menstrual-like cramps, a low dull backache or pressure in your pelvis or back.  · Your baby does not move enough to complete the daily kick count (10 movements in 2 hours).  · Your baby moves much less often than on the days before or you have not felt your baby move all day.  · Please review the MEDICATION LIST section of your AFTER VISIT SUMMARY document.  · Take your medication as prescribed      Other Instructions:  Drink at least a gallon of water a day  Please carefully review your entire AFTER VISIT SUMMARY document for all discharge instructions.

## 2021-06-20 NOTE — ED PROVIDER NOTES
"Emergency Obstetric Consultation     Date of Service  2021    Reason for Consultation  Possible leaking of fluid    History of Presenting Illness  25 y.o. female who presented 2021 with complaints of possible leaking of fluid x 1. She denies regular uterine cramping, denies bleeding and states good fetal movement.She denies dysuria.  She has no other complaints today.     Review of Systems  ROS    Obstetric History    OB History    Para Term  AB Living   3 1 1   1 1   SAB TAB Ectopic Molar Multiple Live Births     1     0 1      # Outcome Date GA Lbr Rainer/2nd Weight Sex Delivery Anes PTL Lv   3 Current            2 Term 06/15/19 40w1d / 00:25 3.91 kg (8 lb 9.9 oz) F Vag-Spont EPI N CINDI   1 TAB                Gynecologic History  No LMP recorded. Patient is pregnant.    Medical History  Past Medical History:   Diagnosis Date   • Depression during pregnancy 2021       Surgical History   has a past surgical history that includes appendectomy laparoscopic (2016); laparoscopic lysis of adhesions (N/A, 2016); other orthopedic surgery; and appendectomy.    Family History  family history includes Cancer in her paternal aunt and paternal grandmother; Diabetes in her paternal grandmother.    Social History   reports that she quit smoking about 4 years ago. Her smoking use included cigarettes. She has a 0.15 pack-year smoking history. She has never used smokeless tobacco. She reports that she does not drink alcohol and does not use drugs.    Medications  Medications Prior to Admission   Medication Sig Dispense Refill Last Dose   • nitrofurantoin (MACROBID) 100 MG Cap Take 1 capsule by mouth 2 times a day. 14 capsule 0    • Prenatal Vit-Fe Fumarate-FA (PRENATAL VITAMIN) 27-0.8 MG Tab Take 1 tablet by mouth every day. 30 tablet 5        Allergies  Allergies   Allergen Reactions   • Erythromycin Unspecified     \"Childhood allergie\".     • Morphine Hives       Physical Exam   VSS   "     Laboratory          Amnisure negative         Imaging  None done today    , pos accels, no decels, moderate variability. UC's none noted with external toco    Assessment & Plan   Patient to DC home stable. [facility] [place] [facility] safe place to go for the evening. Understands all DC instructions. Will f/u next clinic visit.         Cara Conn D.N.P.

## 2021-07-12 ENCOUNTER — TELEPHONE (OUTPATIENT)
Dept: OBGYN | Facility: CLINIC | Age: 26
End: 2021-07-12

## 2021-07-12 ENCOUNTER — ROUTINE PRENATAL (OUTPATIENT)
Dept: OBGYN | Facility: CLINIC | Age: 26
End: 2021-07-12
Payer: MEDICAID

## 2021-07-12 VITALS — BODY MASS INDEX: 28.94 KG/M2 | WEIGHT: 196 LBS | SYSTOLIC BLOOD PRESSURE: 108 MMHG | DIASTOLIC BLOOD PRESSURE: 64 MMHG

## 2021-07-12 PROCEDURE — 90040 PR PRENATAL FOLLOW UP: CPT | Performed by: NURSE PRACTITIONER

## 2021-07-12 ASSESSMENT — FIBROSIS 4 INDEX: FIB4 SCORE: 0.86

## 2021-07-12 NOTE — PROGRESS NOTES
OB follow up   + fetal movement.  No VB, LOF  Pt states she has been having UC's 10-15 minutes apart but irregular at this time  [phone] (home)   Preferred pharmacy confirmed.

## 2021-07-12 NOTE — TELEPHONE ENCOUNTER
Pt called to verify what type of exercises that the provider she saw advises for her to do. Pt was seen by Jaimie ROSS and per provider's notes it is called the pelvic tilt exercises. Info given to Pt and has no other further questions.

## 2021-07-22 ENCOUNTER — ROUTINE PRENATAL (OUTPATIENT)
Dept: OBGYN | Facility: CLINIC | Age: 26
End: 2021-07-22
Payer: MEDICAID

## 2021-07-22 ENCOUNTER — HOSPITAL ENCOUNTER (OUTPATIENT)
Facility: MEDICAL CENTER | Age: 26
End: 2021-07-22
Attending: OBSTETRICS & GYNECOLOGY
Payer: MEDICAID

## 2021-07-22 VITALS — BODY MASS INDEX: 28.65 KG/M2 | DIASTOLIC BLOOD PRESSURE: 78 MMHG | WEIGHT: 194 LBS | SYSTOLIC BLOOD PRESSURE: 128 MMHG

## 2021-07-22 DIAGNOSIS — Z34.80 SUPERVISION OF OTHER NORMAL PREGNANCY, ANTEPARTUM: ICD-10-CM

## 2021-07-22 PROCEDURE — 90040 PR PRENATAL FOLLOW UP: CPT | Performed by: OBSTETRICS & GYNECOLOGY

## 2021-07-22 PROCEDURE — 87081 CULTURE SCREEN ONLY: CPT

## 2021-07-22 PROCEDURE — 87150 DNA/RNA AMPLIFIED PROBE: CPT

## 2021-07-22 ASSESSMENT — FIBROSIS 4 INDEX: FIB4 SCORE: 0.86

## 2021-07-22 NOTE — PROGRESS NOTES
Chief complaint: Return visit    S: Pt presents for routine OB follow up. Good fetal movement.  No contractions, vaginal bleeding, or leakage of fluid.    Questions answered.    O: /78   Wt 88 kg (194 lb)   BMI 28.65 kg/m²   Patients' weight gain, fluid intake and exercise level discussed.  Vitals, fundal height , fetal position, and FHR reviewed on flowsheet    Lab:No results found for this or any previous visit (from the past 336 hour(s)).    A/P:  25 y.o.  at 36w6d presents for routine obstetric follow-up.  Size equals dates and/or scan    1.  Continue prenatal vitamins.  2.  Fetal kick counts.  3.  Exercise at least 30 minutes daily.  4.  Drink at least 2L of water daily  5.  Labor precautions educated.  6.  Follow-up in 1 weeks.  7.  GBS today    Infant was noted to be in breech presentation at last visit.  bedside ultrasound today shows vertex position.  This was discussed with patient    All questions answered

## 2021-07-23 LAB
AMBIGUOUS DTTM AMBI4: NORMAL
SIGNIFICANT IND 70042: NORMAL
SITE SITE: NORMAL
SOURCE SOURCE: NORMAL

## 2021-07-24 LAB — GP B STREP DNA SPEC QL NAA+PROBE: POSITIVE

## 2021-07-25 PROBLEM — B95.1 GROUP BETA STREP POSITIVE: Status: ACTIVE | Noted: 2021-07-25

## 2021-08-09 ENCOUNTER — TELEPHONE (OUTPATIENT)
Dept: OBGYN | Facility: CLINIC | Age: 26
End: 2021-08-09

## 2021-08-10 ENCOUNTER — ROUTINE PRENATAL (OUTPATIENT)
Dept: OBGYN | Facility: CLINIC | Age: 26
End: 2021-08-10
Payer: MEDICAID

## 2021-08-10 VITALS — DIASTOLIC BLOOD PRESSURE: 70 MMHG | SYSTOLIC BLOOD PRESSURE: 110 MMHG | BODY MASS INDEX: 29.12 KG/M2 | WEIGHT: 197.2 LBS

## 2021-08-10 DIAGNOSIS — Z34.80 SUPERVISION OF OTHER NORMAL PREGNANCY, ANTEPARTUM: ICD-10-CM

## 2021-08-10 PROCEDURE — 90040 PR PRENATAL FOLLOW UP: CPT | Performed by: NURSE PRACTITIONER

## 2021-08-10 ASSESSMENT — FIBROSIS 4 INDEX: FIB4 SCORE: 0.86

## 2021-08-10 NOTE — PROGRESS NOTES
OB follow up   + fetal movement. Active  No VB, LOF or UC's.  Phone # [phone]  Preferred pharmacy confirmed.  C/o  Contractions

## 2021-08-13 ENCOUNTER — HOSPITAL ENCOUNTER (EMERGENCY)
Facility: MEDICAL CENTER | Age: 26
End: 2021-08-13
Attending: OBSTETRICS & GYNECOLOGY | Admitting: OBSTETRICS & GYNECOLOGY
Payer: MEDICAID

## 2021-08-13 VITALS
SYSTOLIC BLOOD PRESSURE: 128 MMHG | OXYGEN SATURATION: 95 % | HEART RATE: 107 BPM | HEIGHT: 70 IN | RESPIRATION RATE: 16 BRPM | BODY MASS INDEX: 28.2 KG/M2 | DIASTOLIC BLOOD PRESSURE: 92 MMHG | WEIGHT: 197 LBS

## 2021-08-13 PROCEDURE — 302449 STATCHG TRIAGE ONLY (STATISTIC)

## 2021-08-13 PROCEDURE — 59025 FETAL NON-STRESS TEST: CPT

## 2021-08-13 ASSESSMENT — FIBROSIS 4 INDEX: FIB4 SCORE: 0.86

## 2021-08-14 NOTE — PROGRESS NOTES
Late entry due to patient care    Patient is a  at 40weeks and 0days, EDC of . Arrived to unit and placed in LDA 4, RN placed EFM and toco to ensure fetal well being and monitor uterine activity. RN educated patient on need for monitors and patient verbalized understanding. Vital signs taken. Available prenatal labs / records reviewed by RN. Patient's chief complaint contractions that have been consistent throughout the day.  Patient denies leaking or vaginal bleeding, reports positive fetal movement.    SVE 1-2/thick long.   LBronson notified of patient arrival, report given with an MFTI of 3,  & para reviewed, bps, fhr, contraction pattern reviewed, LBronson reviewed strip. Discharge orders received.    RN at bedside, discharge instructions reviewed and given to patient, patient asking about an induction tonight, RN will inquire with [facility] team regarding iol now, LBronson notified of patient request. Patient has schedule iol on .   RN back to bedside and explained to patient scheduled iol and unable to fulfil iol request at this time; all questions answered. Patient discharged in stable condition,  given labor precautions. Patient ambulated to private car. Handwashing and discharge instructions given as follows:   General Instructions:  · If you think you are in labor, time contractions (lying on your left side) from the beginning of one contraction to the beginning of the next contraction for at least one hour.  · Increase fluid intake: you should consume 10-12 8 oz glasses of non-caffeinated fluid per day.  · Report any pressure or burning on urination to your physician.  · Monitor fetal movement: If you notice an absence or decrease in fetal movement, drink a large glass of water and rest on your side.  If there is no increase in movement, call your physician or go to the hospital for further evaluation.  · Report any sudden, sharp abdominal pain.  · Report any bleeding.   Spotting or pinkish discharge is normal after vaginal exam.  You may also spot after sexual intercourse.  Labor Instructions (37 - 39 weeks):  Call your physician or return to hospital if:  · You have regular contractions that get progressively closer, longer and stronger.  · Your water breaks (remember time and color).  · You have bleeding like a period.  · Your baby does not move enough to complete the daily kick counts (10 movements in 2 hours)  · Your baby moves much less often than on the days before or you have not felt your baby move all day.  All questions answered and patient knows to keep her scheduled appointment with her OB.

## 2021-08-17 ENCOUNTER — HOSPITAL ENCOUNTER (OUTPATIENT)
Dept: OBGYN | Facility: MEDICAL CENTER | Age: 26
End: 2021-08-17
Attending: OBSTETRICS & GYNECOLOGY
Payer: MEDICAID

## 2021-08-17 PROCEDURE — U0005 INFEC AGEN DETEC AMPLI PROBE: HCPCS

## 2021-08-17 PROCEDURE — U0003 INFECTIOUS AGENT DETECTION BY NUCLEIC ACID (DNA OR RNA); SEVERE ACUTE RESPIRATORY SYNDROME CORONAVIRUS 2 (SARS-COV-2) (CORONAVIRUS DISEASE [COVID-19]), AMPLIFIED PROBE TECHNIQUE, MAKING USE OF HIGH THROUGHPUT TECHNOLOGIES AS DESCRIBED BY CMS-2020-01-R: HCPCS

## 2021-08-18 LAB
SARS-COV-2 RNA RESP QL NAA+PROBE: NOTDETECTED
SPECIMEN SOURCE: NORMAL

## 2021-08-19 ENCOUNTER — HOSPITAL ENCOUNTER (INPATIENT)
Facility: MEDICAL CENTER | Age: 26
LOS: 2 days | End: 2021-08-22
Attending: OBSTETRICS & GYNECOLOGY | Admitting: OBSTETRICS & GYNECOLOGY
Payer: MEDICAID

## 2021-08-19 ENCOUNTER — ROUTINE PRENATAL (OUTPATIENT)
Dept: OBGYN | Facility: CLINIC | Age: 26
End: 2021-08-19
Payer: MEDICAID

## 2021-08-19 ENCOUNTER — TELEPHONE (OUTPATIENT)
Dept: OBGYN | Facility: CLINIC | Age: 26
End: 2021-08-19

## 2021-08-19 VITALS — WEIGHT: 198 LBS | BODY MASS INDEX: 28.41 KG/M2 | SYSTOLIC BLOOD PRESSURE: 122 MMHG | DIASTOLIC BLOOD PRESSURE: 80 MMHG

## 2021-08-19 DIAGNOSIS — Z34.80 SUPERVISION OF OTHER NORMAL PREGNANCY, ANTEPARTUM: Primary | ICD-10-CM

## 2021-08-19 LAB
BASOPHILS # BLD AUTO: 0.4 % (ref 0–1.8)
BASOPHILS # BLD: 0.04 K/UL (ref 0–0.12)
EOSINOPHIL # BLD AUTO: 0.05 K/UL (ref 0–0.51)
EOSINOPHIL NFR BLD: 0.5 % (ref 0–6.9)
ERYTHROCYTE [DISTWIDTH] IN BLOOD BY AUTOMATED COUNT: 47.8 FL (ref 35.9–50)
HCT VFR BLD AUTO: 36.6 % (ref 37–47)
HGB BLD-MCNC: 12.2 G/DL (ref 12–16)
HOLDING TUBE BB 8507: NORMAL
IMM GRANULOCYTES # BLD AUTO: 0.06 K/UL (ref 0–0.11)
IMM GRANULOCYTES NFR BLD AUTO: 0.5 % (ref 0–0.9)
LYMPHOCYTES # BLD AUTO: 1.89 K/UL (ref 1–4.8)
LYMPHOCYTES NFR BLD: 17.3 % (ref 22–41)
MCH RBC QN AUTO: 31.9 PG (ref 27–33)
MCHC RBC AUTO-ENTMCNC: 33.3 G/DL (ref 33.6–35)
MCV RBC AUTO: 95.6 FL (ref 81.4–97.8)
MONOCYTES # BLD AUTO: 0.64 K/UL (ref 0–0.85)
MONOCYTES NFR BLD AUTO: 5.9 % (ref 0–13.4)
NEUTROPHILS # BLD AUTO: 8.26 K/UL (ref 2–7.15)
NEUTROPHILS NFR BLD: 75.4 % (ref 44–72)
NRBC # BLD AUTO: 0 K/UL
NRBC BLD-RTO: 0 /100 WBC
PLATELET # BLD AUTO: 198 K/UL (ref 164–446)
PMV BLD AUTO: 12.1 FL (ref 9–12.9)
RBC # BLD AUTO: 3.83 M/UL (ref 4.2–5.4)
WBC # BLD AUTO: 10.9 K/UL (ref 4.8–10.8)

## 2021-08-19 PROCEDURE — 3E033VJ INTRODUCTION OF OTHER HORMONE INTO PERIPHERAL VEIN, PERCUTANEOUS APPROACH: ICD-10-PCS | Performed by: OBSTETRICS & GYNECOLOGY

## 2021-08-19 PROCEDURE — 83036 HEMOGLOBIN GLYCOSYLATED A1C: CPT

## 2021-08-19 PROCEDURE — 700111 HCHG RX REV CODE 636 W/ 250 OVERRIDE (IP): Performed by: ADVANCED PRACTICE MIDWIFE

## 2021-08-19 PROCEDURE — 700105 HCHG RX REV CODE 258: Performed by: ADVANCED PRACTICE MIDWIFE

## 2021-08-19 PROCEDURE — 85025 COMPLETE CBC W/AUTO DIFF WBC: CPT

## 2021-08-19 PROCEDURE — 90040 PR PRENATAL FOLLOW UP: CPT | Performed by: NURSE PRACTITIONER

## 2021-08-19 RX ORDER — SODIUM CHLORIDE, SODIUM LACTATE, POTASSIUM CHLORIDE, CALCIUM CHLORIDE 600; 310; 30; 20 MG/100ML; MG/100ML; MG/100ML; MG/100ML
INJECTION, SOLUTION INTRAVENOUS CONTINUOUS
Status: ACTIVE | OUTPATIENT
Start: 2021-08-19 | End: 2021-08-20

## 2021-08-19 RX ORDER — PENICILLIN G POTASSIUM 5000000 [IU]/1
INJECTION, POWDER, FOR SOLUTION INTRAMUSCULAR; INTRAVENOUS
Status: ACTIVE
Start: 2021-08-19 | End: 2021-08-20

## 2021-08-19 RX ADMIN — SODIUM CHLORIDE 5 MILLION UNITS: 900 INJECTION INTRAVENOUS at 23:27

## 2021-08-19 ASSESSMENT — FIBROSIS 4 INDEX
FIB4 SCORE: 0.86
FIB4 SCORE: 0.86

## 2021-08-19 ASSESSMENT — LIFESTYLE VARIABLES: EVER_SMOKED: YES

## 2021-08-19 NOTE — PROGRESS NOTES
OB follow up   + fetal movement.  No VB, LOF   Pt states she has been having UC's on and off the last few weeks  [phone] (home)   IOL 8/19/21 10PM   Preferred pharmacy confirmed.

## 2021-08-19 NOTE — TELEPHONE ENCOUNTER
Patient called today stating that she missed her appointment earlier today. Patient was wondering if she'd be able to be seen today. Patient was Transferred over to [facility] to see what they could do for her.

## 2021-08-19 NOTE — PROGRESS NOTES
Complains of irregular UC's today  Denies LOF or VB, states baby is moving well  IOL tonight, IP gel  GBS positive  No 3rd trimester labs drawn, other labs and US done  Labor precautions reviewed, all questions answered  Follow up for PP visit    Madonna GILLM, APRN

## 2021-08-20 ENCOUNTER — ANESTHESIA (OUTPATIENT)
Dept: ANESTHESIOLOGY | Facility: MEDICAL CENTER | Age: 26
End: 2021-08-20
Payer: MEDICAID

## 2021-08-20 ENCOUNTER — ANESTHESIA EVENT (OUTPATIENT)
Dept: ANESTHESIOLOGY | Facility: MEDICAL CENTER | Age: 26
End: 2021-08-20
Payer: MEDICAID

## 2021-08-20 LAB
EST. AVERAGE GLUCOSE BLD GHB EST-MCNC: 100 MG/DL
HBA1C MFR BLD: 5.1 % (ref 4–5.6)

## 2021-08-20 PROCEDURE — 303615 HCHG EPIDURAL/SPINAL ANESTHESIA FOR LABOR

## 2021-08-20 PROCEDURE — 700105 HCHG RX REV CODE 258: Performed by: ADVANCED PRACTICE MIDWIFE

## 2021-08-20 PROCEDURE — 700111 HCHG RX REV CODE 636 W/ 250 OVERRIDE (IP): Performed by: ANESTHESIOLOGY

## 2021-08-20 PROCEDURE — 700111 HCHG RX REV CODE 636 W/ 250 OVERRIDE (IP): Performed by: ADVANCED PRACTICE MIDWIFE

## 2021-08-20 PROCEDURE — 59410 OBSTETRICAL CARE: CPT | Performed by: OBSTETRICS & GYNECOLOGY

## 2021-08-20 PROCEDURE — 700111 HCHG RX REV CODE 636 W/ 250 OVERRIDE (IP)

## 2021-08-20 PROCEDURE — 700111 HCHG RX REV CODE 636 W/ 250 OVERRIDE (IP): Performed by: OBSTETRICS & GYNECOLOGY

## 2021-08-20 PROCEDURE — 700102 HCHG RX REV CODE 250 W/ 637 OVERRIDE(OP): Performed by: OBSTETRICS & GYNECOLOGY

## 2021-08-20 PROCEDURE — A9270 NON-COVERED ITEM OR SERVICE: HCPCS | Performed by: OBSTETRICS & GYNECOLOGY

## 2021-08-20 PROCEDURE — 700102 HCHG RX REV CODE 250 W/ 637 OVERRIDE(OP): Performed by: ADVANCED PRACTICE MIDWIFE

## 2021-08-20 PROCEDURE — 700105 HCHG RX REV CODE 258: Performed by: ANESTHESIOLOGY

## 2021-08-20 PROCEDURE — 700101 HCHG RX REV CODE 250: Performed by: ANESTHESIOLOGY

## 2021-08-20 PROCEDURE — 36415 COLL VENOUS BLD VENIPUNCTURE: CPT

## 2021-08-20 PROCEDURE — 304965 HCHG RECOVERY SERVICES

## 2021-08-20 PROCEDURE — 59409 OBSTETRICAL CARE: CPT

## 2021-08-20 PROCEDURE — 10907ZC DRAINAGE OF AMNIOTIC FLUID, THERAPEUTIC FROM PRODUCTS OF CONCEPTION, VIA NATURAL OR ARTIFICIAL OPENING: ICD-10-PCS | Performed by: OBSTETRICS & GYNECOLOGY

## 2021-08-20 PROCEDURE — A9270 NON-COVERED ITEM OR SERVICE: HCPCS | Performed by: ADVANCED PRACTICE MIDWIFE

## 2021-08-20 PROCEDURE — 770002 HCHG ROOM/CARE - OB PRIVATE (112)

## 2021-08-20 PROCEDURE — 302130 PCEA EPIDURAL PUMP: Performed by: ANESTHESIOLOGY

## 2021-08-20 RX ORDER — ROPIVACAINE HYDROCHLORIDE 2 MG/ML
INJECTION, SOLUTION EPIDURAL; INFILTRATION; PERINEURAL
Status: COMPLETED
Start: 2021-08-20 | End: 2021-08-20

## 2021-08-20 RX ORDER — BUPIVACAINE HYDROCHLORIDE 2.5 MG/ML
INJECTION, SOLUTION EPIDURAL; INFILTRATION; INTRACAUDAL
Status: COMPLETED
Start: 2021-08-20 | End: 2021-08-20

## 2021-08-20 RX ORDER — BUPIVACAINE HYDROCHLORIDE 2.5 MG/ML
INJECTION, SOLUTION EPIDURAL; INFILTRATION; INTRACAUDAL
Status: COMPLETED | OUTPATIENT
Start: 2021-08-20 | End: 2021-08-20

## 2021-08-20 RX ORDER — LIDOCAINE HYDROCHLORIDE AND EPINEPHRINE 15; 5 MG/ML; UG/ML
INJECTION, SOLUTION EPIDURAL
Status: COMPLETED | OUTPATIENT
Start: 2021-08-20 | End: 2021-08-20

## 2021-08-20 RX ORDER — SODIUM CHLORIDE, SODIUM LACTATE, POTASSIUM CHLORIDE, AND CALCIUM CHLORIDE .6; .31; .03; .02 G/100ML; G/100ML; G/100ML; G/100ML
1000 INJECTION, SOLUTION INTRAVENOUS
Status: COMPLETED | OUTPATIENT
Start: 2021-08-20 | End: 2021-08-20

## 2021-08-20 RX ORDER — SODIUM CHLORIDE, SODIUM LACTATE, POTASSIUM CHLORIDE, AND CALCIUM CHLORIDE .6; .31; .03; .02 G/100ML; G/100ML; G/100ML; G/100ML
250 INJECTION, SOLUTION INTRAVENOUS PRN
Status: DISCONTINUED | OUTPATIENT
Start: 2021-08-20 | End: 2021-08-20 | Stop reason: HOSPADM

## 2021-08-20 RX ORDER — MISOPROSTOL 200 UG/1
600 TABLET ORAL
Status: DISCONTINUED | OUTPATIENT
Start: 2021-08-20 | End: 2021-08-22 | Stop reason: HOSPADM

## 2021-08-20 RX ORDER — HYDROCODONE BITARTRATE AND ACETAMINOPHEN 5; 325 MG/1; MG/1
1 TABLET ORAL EVERY 4 HOURS PRN
Status: DISCONTINUED | OUTPATIENT
Start: 2021-08-20 | End: 2021-08-22 | Stop reason: HOSPADM

## 2021-08-20 RX ORDER — ROPIVACAINE HYDROCHLORIDE 2 MG/ML
INJECTION, SOLUTION EPIDURAL; INFILTRATION; PERINEURAL CONTINUOUS
Status: DISCONTINUED | OUTPATIENT
Start: 2021-08-20 | End: 2021-08-22 | Stop reason: HOSPADM

## 2021-08-20 RX ORDER — SODIUM CHLORIDE, SODIUM LACTATE, POTASSIUM CHLORIDE, CALCIUM CHLORIDE 600; 310; 30; 20 MG/100ML; MG/100ML; MG/100ML; MG/100ML
INJECTION, SOLUTION INTRAVENOUS PRN
Status: DISCONTINUED | OUTPATIENT
Start: 2021-08-20 | End: 2021-08-22 | Stop reason: HOSPADM

## 2021-08-20 RX ORDER — IBUPROFEN 600 MG/1
600 TABLET ORAL EVERY 6 HOURS PRN
Status: DISCONTINUED | OUTPATIENT
Start: 2021-08-20 | End: 2021-08-22 | Stop reason: HOSPADM

## 2021-08-20 RX ORDER — ONDANSETRON 2 MG/ML
4 INJECTION INTRAMUSCULAR; INTRAVENOUS EVERY 6 HOURS PRN
Status: DISCONTINUED | OUTPATIENT
Start: 2021-08-20 | End: 2021-08-22 | Stop reason: HOSPADM

## 2021-08-20 RX ORDER — METHYLERGONOVINE MALEATE 0.2 MG/ML
0.2 INJECTION INTRAVENOUS
Status: DISCONTINUED | OUTPATIENT
Start: 2021-08-20 | End: 2021-08-22 | Stop reason: HOSPADM

## 2021-08-20 RX ORDER — ONDANSETRON 4 MG/1
4 TABLET, ORALLY DISINTEGRATING ORAL EVERY 6 HOURS PRN
Status: DISCONTINUED | OUTPATIENT
Start: 2021-08-20 | End: 2021-08-22 | Stop reason: HOSPADM

## 2021-08-20 RX ORDER — VITAMIN A ACETATE, BETA CAROTENE, ASCORBIC ACID, CHOLECALCIFEROL, .ALPHA.-TOCOPHEROL ACETATE, DL-, THIAMINE MONONITRATE, RIBOFLAVIN, NIACINAMIDE, PYRIDOXINE HYDROCHLORIDE, FOLIC ACID, CYANOCOBALAMIN, CALCIUM CARBONATE, FERROUS FUMARATE, ZINC OXIDE, CUPRIC OXIDE 3080; 12; 120; 400; 1; 1.84; 3; 20; 22; 920; 25; 200; 27; 10; 2 [IU]/1; UG/1; MG/1; [IU]/1; MG/1; MG/1; MG/1; MG/1; MG/1; [IU]/1; MG/1; MG/1; MG/1; MG/1; MG/1
1 TABLET, FILM COATED ORAL
Status: DISCONTINUED | OUTPATIENT
Start: 2021-08-21 | End: 2021-08-22 | Stop reason: HOSPADM

## 2021-08-20 RX ORDER — DOCUSATE SODIUM 100 MG/1
100 CAPSULE, LIQUID FILLED ORAL 2 TIMES DAILY PRN
Status: DISCONTINUED | OUTPATIENT
Start: 2021-08-20 | End: 2021-08-22 | Stop reason: HOSPADM

## 2021-08-20 RX ADMIN — FENTANYL CITRATE 50 MCG: 50 INJECTION, SOLUTION INTRAMUSCULAR; INTRAVENOUS at 05:12

## 2021-08-20 RX ADMIN — IBUPROFEN 600 MG: 600 TABLET, FILM COATED ORAL at 21:10

## 2021-08-20 RX ADMIN — FENTANYL CITRATE 100 MCG: 50 INJECTION, SOLUTION INTRAMUSCULAR; INTRAVENOUS at 12:14

## 2021-08-20 RX ADMIN — FAMOTIDINE 20 MG: 10 INJECTION INTRAVENOUS at 01:32

## 2021-08-20 RX ADMIN — SODIUM CHLORIDE, POTASSIUM CHLORIDE, SODIUM LACTATE AND CALCIUM CHLORIDE: 600; 310; 30; 20 INJECTION, SOLUTION INTRAVENOUS at 00:07

## 2021-08-20 RX ADMIN — LIDOCAINE HYDROCHLORIDE,EPINEPHRINE BITARTRATE 3 ML: 15; .005 INJECTION, SOLUTION EPIDURAL; INFILTRATION; INTRACAUDAL; PERINEURAL at 12:14

## 2021-08-20 RX ADMIN — FENTANYL CITRATE 50 MCG: 50 INJECTION, SOLUTION INTRAMUSCULAR; INTRAVENOUS at 08:13

## 2021-08-20 RX ADMIN — SODIUM CHLORIDE 2.5 MILLION UNITS: 9 INJECTION, SOLUTION INTRAVENOUS at 11:57

## 2021-08-20 RX ADMIN — BUPIVACAINE HYDROCHLORIDE 8 ML: 2.5 INJECTION, SOLUTION EPIDURAL; INFILTRATION; INTRACAUDAL; PERINEURAL at 12:14

## 2021-08-20 RX ADMIN — FENTANYL CITRATE 100 MCG: 50 INJECTION, SOLUTION INTRAMUSCULAR; INTRAVENOUS at 02:30

## 2021-08-20 RX ADMIN — ROPIVACAINE HYDROCHLORIDE: 2 INJECTION, SOLUTION EPIDURAL; INFILTRATION at 12:24

## 2021-08-20 RX ADMIN — OXYTOCIN 125 ML/HR: 10 INJECTION, SOLUTION INTRAMUSCULAR; INTRAVENOUS at 21:05

## 2021-08-20 RX ADMIN — SODIUM CHLORIDE 2.5 MILLION UNITS: 9 INJECTION, SOLUTION INTRAVENOUS at 03:36

## 2021-08-20 RX ADMIN — HYDROCODONE BITARTRATE AND ACETAMINOPHEN 1 TABLET: 5; 325 TABLET ORAL at 23:35

## 2021-08-20 RX ADMIN — SODIUM CHLORIDE 2.5 MILLION UNITS: 9 INJECTION, SOLUTION INTRAVENOUS at 08:18

## 2021-08-20 RX ADMIN — OXYTOCIN 1 MILLI-UNITS/MIN: 10 INJECTION, SOLUTION INTRAMUSCULAR; INTRAVENOUS at 04:25

## 2021-08-20 RX ADMIN — SODIUM CHLORIDE, POTASSIUM CHLORIDE, SODIUM LACTATE AND CALCIUM CHLORIDE 1000 ML: 600; 310; 30; 20 INJECTION, SOLUTION INTRAVENOUS at 11:59

## 2021-08-20 RX ADMIN — ROPIVACAINE HYDROCHLORIDE: 2 INJECTION, SOLUTION EPIDURAL; INFILTRATION; PERINEURAL at 12:24

## 2021-08-20 RX ADMIN — SODIUM CHLORIDE 2.5 MILLION UNITS: 9 INJECTION, SOLUTION INTRAVENOUS at 16:05

## 2021-08-20 ASSESSMENT — PAIN DESCRIPTION - PAIN TYPE
TYPE: ACUTE PAIN

## 2021-08-20 ASSESSMENT — PATIENT HEALTH QUESTIONNAIRE - PHQ9
1. LITTLE INTEREST OR PLEASURE IN DOING THINGS: NOT AT ALL
2. FEELING DOWN, DEPRESSED, IRRITABLE, OR HOPELESS: NOT AT ALL
1. LITTLE INTEREST OR PLEASURE IN DOING THINGS: NOT AT ALL
2. FEELING DOWN, DEPRESSED, IRRITABLE, OR HOPELESS: NOT AT ALL
SUM OF ALL RESPONSES TO PHQ9 QUESTIONS 1 AND 2: 0
SUM OF ALL RESPONSES TO PHQ9 QUESTIONS 1 AND 2: 0

## 2021-08-20 ASSESSMENT — PAIN SCALES - GENERAL: PAIN_LEVEL: 0

## 2021-08-20 NOTE — ANESTHESIA PREPROCEDURE EVALUATION
24 yo  at 41-0 cueva IUP here for IOL requesting epidural    Relevant Problems   No relevant active problems       Physical Exam    Airway   Mallampati: II  TM distance: >3 FB  Neck ROM: full       Cardiovascular - normal exam  Rhythm: regular  Rate: normal  (-) murmur     Dental - normal exam           Pulmonary - normal exam  Breath sounds clear to auscultation     Abdominal    Neurological - normal exam                 Anesthesia Plan    ASA 2       Plan - epidural   Neuraxial block will be labor analgesia                  Pertinent diagnostic labs and testing reviewed    Informed Consent:    Anesthetic plan and risks discussed with patient.

## 2021-08-20 NOTE — PROGRESS NOTES
2212: Pt in room, placed on EFM and TOCO, comfortable in room. Pt is a  3 para 1, presented for being induced for post date, pt reports positive fetal movement. Report given to midwife, orders received.     2250: SVE as charted.     0700: Bedside report given to Rohini MCCARTY, FOB present in room, pt comfortable in room.

## 2021-08-20 NOTE — CARE PLAN
The patient is Stable - Low risk of patient condition declining or worsening         Progress made toward(s) clinical / shift goals:  yes    Problem: Risk for Infection and Impaired Wound Healing  Goal: Patient will remain free from infection  Outcome: Progressing  Note: Pt will remain afebrile, pt will have no s/s of infection     Problem: Pain  Goal: Patient's pain will be alleviated or reduced to the patient’s comfort goal  Outcome: Progressing  Note: Pt continuously monitored for pain, educated on pain management options. Call light within reach, pt understands to call for RN regarding any needs or concerns.

## 2021-08-20 NOTE — PROGRESS NOTES
Labor Progress Note    Gwendolyn Masters     IUP@41w0d  IOL for post EDC      Subjective:  Patient is without complaints currently.  She received fentanyl recently for pain management and is planning to get an epidural soon.  She has no complaints or concerns currently.  Report was received from previous shift patient is in labor but last cervical exam 4-80-2  Objective:   Vitals:    08/19/21 2230 08/19/21 2231 08/20/21 0344 08/20/21 0700   BP: 123/76 123/76 109/75 112/75   Pulse: 94 94 67 62   Resp:  18     Temp:  36.9 °C (98.5 °F) 36.1 °C (97 °F) 36.4 °C (97.6 °F)   TempSrc:  Temporal Temporal Temporal   Weight:       Height:         Cervical exam per nurse was 4 cm/80%/-2 station bulging membranes      Fetal heart rate tracing:  Fetal heart rate baseline is in the 120s with accelerations and no decelerations.  Moderate fetal heart rate variability is present.  On tocometer contractions were noted every 5 to 8 minutes.  Category 1 tracing      Meds:   Epidural : .no  Pitocin: .yes    Labs:  Recent Results (from the past 24 hour(s))   Hold Blood Bank Specimen (Not Tested)    Collection Time: 08/19/21 10:40 PM   Result Value Ref Range    Holding Tube - Bb DONE    CBC WITH DIFFERENTIAL    Collection Time: 08/19/21 10:40 PM   Result Value Ref Range    WBC 10.9 (H) 4.8 - 10.8 K/uL    RBC 3.83 (L) 4.20 - 5.40 M/uL    Hemoglobin 12.2 12.0 - 16.0 g/dL    Hematocrit 36.6 (L) 37.0 - 47.0 %    MCV 95.6 81.4 - 97.8 fL    MCH 31.9 27.0 - 33.0 pg    MCHC 33.3 (L) 33.6 - 35.0 g/dL    RDW 47.8 35.9 - 50.0 fL    Platelet Count 198 164 - 446 K/uL    MPV 12.1 9.0 - 12.9 fL    Neutrophils-Polys 75.40 (H) 44.00 - 72.00 %    Lymphocytes 17.30 (L) 22.00 - 41.00 %    Monocytes 5.90 0.00 - 13.40 %    Eosinophils 0.50 0.00 - 6.90 %    Basophils 0.40 0.00 - 1.80 %    Immature Granulocytes 0.50 0.00 - 0.90 %    Nucleated RBC 0.00 /100 WBC    Neutrophils (Absolute) 8.26 (H) 2.00 - 7.15 K/uL    Lymphs (Absolute) 1.89 1.00 - 4.80 K/uL    Monos  (Absolute) 0.64 0.00 - 0.85 K/uL    Eos (Absolute) 0.05 0.00 - 0.51 K/uL    Baso (Absolute) 0.04 0.00 - 0.12 K/uL    Immature Granulocytes (abs) 0.06 0.00 - 0.11 K/uL    NRBC (Absolute) 0.00 K/uL   HEMOGLOBIN A1C    Collection Time: 08/19/21 10:40 PM   Result Value Ref Range    Glycohemoglobin 5.1 4.0 - 5.6 %    Est Avg Glucose 100 mg/dL       Assessment:   IUP@41w0d  Labor State: Active phase labor.  Patient was admitted for induction of labor due to post EDC status  GBS positive on penicillin protocol  On Pitocin induction of labor  Category 1 fetal heart rate tracing    Plan:  Continue present management  We discussed labor management and plan of care  Patient is desiring epidural  We will recheck cervix after epidural and perform amniotomy      Garrick Cabrera M.D.

## 2021-08-20 NOTE — CARE PLAN
Problem: Risk for Infection and Impaired Wound Healing  Goal: Patient will remain free from infection  Outcome: Progressing  Goal: Patient's wound/surgical incision will decrease in size and heals properly  Outcome: Progressing     Problem: Risk for Injury  Goal: Patient and fetus will be free of preventable injury/complications  Outcome: Progressing     Problem: Pain  Goal: Patient's pain will be alleviated or reduced to the patient’s comfort goal  Outcome: Progressing     The patient is Stable - Low risk of patient condition declining or worsening

## 2021-08-20 NOTE — PROGRESS NOTES
1030 Report received from Sofy MCCARTY, POC discussed. Pt awaiting epidural placement. PT denies any needs at this time.     1150 Dr. Cabrera okay with pt getting epidural prior to C/S case. Pts bolus started and Dr. Bahena notified.     1210 Dr. Bahena at bedside for epidural placement.     1415 RN at bedside, pt denies any needs at this time.     1640 RN at bedside, pt repositioned.     1755 Pt called out feeling a lot of pressure, SVE as charted, pt repositioned.     1905 RN at bedside, pt resting comfortably at this time.     2004 RN and Dr. Cabrera at bedside, pushing started.    2015 Delivery of viable baby boy, apgars 8/9, , small laceration not repaired. Mom and baby resting comfortably skin to skin.     2210 RN at bedside, pt up to the restroom. Pt a one person assist as her left leg is still fairly numb. Pt unable to void, pt educated on voiding expectations.     2225 Pt transferred to  via wheelchair.    2235 Report given to Kimberly MCCARTY, POC discussed.

## 2021-08-20 NOTE — H&P
"Subjective:       Gwendolyn Masters is a 25 y.o.  female with EDC 2021 at 40 and 6/7 weeks gestation who is being admitted for induction of labor.  Her current obstetrical history is significant for GBS colonizer.  Patient reports occasional contractions.   Fetal Movement: normal.      Objective:       /76   Pulse 94   Temp 36.9 °C (98.5 °F) (Temporal)   Resp 18   Ht 1.778 m (5' 10\")   Wt 89.8 kg (198 lb)   BMI 28.41 kg/m²     General:   no acute distress, alert, cooperative   Skin:   normal   HEENT:  PERRLA   Lungs:   CTA bilateral   Heart:   S1, S2 normal, no murmur, click, rub or gallop, regular rate and rhythm, brisk carotid upstroke without bruits, peripheral pulses very brisk, chest is clear without rales or wheezing, no pedal edema, no JVD, no hepatosplenomegaly       Abdomen:  Abdomen soft, non-tender. BS normal. No masses,  No organomegaly   Pelvis:  Exam deferred.   FHT:  135 BPM       Presentations: Cephalic    Cervix: Per RN Assessment    Dilation: 4    Effacement: 85%    Station:  -2    Consistency: Soft    Position: Posterior     Lab Review   GBS positive   AFP:Not done   One hour GTT: Normal      Assessment:      40 and 6/7 weeks gestation.  Early latent labor.  Obstetrical history significant for GBS colonizer.      Plan:      Risks, benefits, alternatives and possible complications have been discussed in detail with the patient.  Pre-admission, admission, and post admission procedures and expectations were discussed in detail.  All questions answered, all appropriate consents will be signed at the Hospital. Admission is planned for today.   Proceed with GBS prophylaxis and initiate IOL after adequately treated.   Anticipate vaginal delivery.   "

## 2021-08-20 NOTE — ANESTHESIA PROCEDURE NOTES
Epidural Block    Date/Time: 8/20/2021 12:14 PM  Performed by: Vanessa Bahena M.D.  Authorized by: Vanessa Bahena M.D.     Patient Location:  OB  Start Time:  8/20/2021 12:14 PM  End Time:  8/20/2021 12:20 PM  Reason for Block: labor analgesia    patient identified, IV checked, site marked, risks and benefits discussed, surgical consent, monitors and equipment checked, pre-op evaluation and timeout performed    Patient Position:  Sitting  Prep: ChloraPrep, patient draped and sterile technique    Monitoring:  Blood pressure, continuous pulse oximetry and heart rate  Approach:  Midline  Location:  L3-L4  Injection Technique:  MERON saline  Skin infiltration:  Lidocaine  Strength:  1%  Dose:  3ml  Needle Type:  Tuohy  Needle Gauge:  17 G  Needle Length:  3.5 in  Loss of resistance::  5  Catheter Size:  19 G  Catheter at Skin Depth:  10  Test Dose Result:  Negative   Single pass, uncomplicated placement.  Pt tolerated procedure well.

## 2021-08-20 NOTE — PROGRESS NOTES
S: Patient is comfortable with epidural and has no complaints.  Patient is on Pitocin induction    O: Vital signs are stable and patient is afebrile    Fetal heart rate tracing:  Fetal heart rate baseline is in the 130s with accelerations and no decelerations.  Moderate fetal heart rate variability is present.  On tocometer contractions were 3 to 5 minutes apart.  Category 1 tracing      SVE: 4 to 5 cm/100% effaced/ -1 station  Amniotomy performed with copious amount of clear fluid obtained        Assessment and plan:  25-year-old -0-1-1 at 41 weeks gestation here for induction of labor  Patient is currently on Pitocin induction   status post amniotomy with clear fluid   status post epidural  Category 1 fetal heart rate tracing   we will continue present management   Plan of care discussed

## 2021-08-20 NOTE — PROGRESS NOTES
0700 Report received from Shannon MCCARTY. Pt resting in bed with FOB in room for support. Discussed POC. SVE unchanged. Pt verbalizes wanting an epidural.

## 2021-08-21 LAB
ERYTHROCYTE [DISTWIDTH] IN BLOOD BY AUTOMATED COUNT: 51.7 FL (ref 35.9–50)
HCT VFR BLD AUTO: 36.8 % (ref 37–47)
HGB BLD-MCNC: 11.6 G/DL (ref 12–16)
MCH RBC QN AUTO: 32 PG (ref 27–33)
MCHC RBC AUTO-ENTMCNC: 31.5 G/DL (ref 33.6–35)
MCV RBC AUTO: 101.7 FL (ref 81.4–97.8)
PLATELET # BLD AUTO: 176 K/UL (ref 164–446)
PMV BLD AUTO: 11.9 FL (ref 9–12.9)
RBC # BLD AUTO: 3.62 M/UL (ref 4.2–5.4)
WBC # BLD AUTO: 11.1 K/UL (ref 4.8–10.8)

## 2021-08-21 PROCEDURE — 36415 COLL VENOUS BLD VENIPUNCTURE: CPT

## 2021-08-21 PROCEDURE — 770002 HCHG ROOM/CARE - OB PRIVATE (112)

## 2021-08-21 PROCEDURE — A9270 NON-COVERED ITEM OR SERVICE: HCPCS | Performed by: ADVANCED PRACTICE MIDWIFE

## 2021-08-21 PROCEDURE — 700111 HCHG RX REV CODE 636 W/ 250 OVERRIDE (IP): Performed by: ADVANCED PRACTICE MIDWIFE

## 2021-08-21 PROCEDURE — 700102 HCHG RX REV CODE 250 W/ 637 OVERRIDE(OP): Performed by: OBSTETRICS & GYNECOLOGY

## 2021-08-21 PROCEDURE — 700102 HCHG RX REV CODE 250 W/ 637 OVERRIDE(OP): Performed by: ADVANCED PRACTICE MIDWIFE

## 2021-08-21 PROCEDURE — A9270 NON-COVERED ITEM OR SERVICE: HCPCS | Performed by: OBSTETRICS & GYNECOLOGY

## 2021-08-21 PROCEDURE — 85027 COMPLETE CBC AUTOMATED: CPT

## 2021-08-21 RX ADMIN — IBUPROFEN 600 MG: 600 TABLET, FILM COATED ORAL at 04:27

## 2021-08-21 RX ADMIN — PRENATAL WITH FERROUS FUM AND FOLIC ACID 1 TABLET: 3080; 920; 120; 400; 22; 1.84; 3; 20; 10; 1; 12; 200; 27; 25; 2 TABLET ORAL at 08:25

## 2021-08-21 RX ADMIN — ONDANSETRON 4 MG: 4 TABLET, ORALLY DISINTEGRATING ORAL at 00:49

## 2021-08-21 RX ADMIN — HYDROCODONE BITARTRATE AND ACETAMINOPHEN 1 TABLET: 5; 325 TABLET ORAL at 18:00

## 2021-08-21 RX ADMIN — HYDROCODONE BITARTRATE AND ACETAMINOPHEN 1 TABLET: 5; 325 TABLET ORAL at 04:27

## 2021-08-21 RX ADMIN — IBUPROFEN 600 MG: 600 TABLET, FILM COATED ORAL at 18:00

## 2021-08-21 ASSESSMENT — PAIN DESCRIPTION - PAIN TYPE
TYPE: ACUTE PAIN

## 2021-08-21 NOTE — ANESTHESIA TIME REPORT
Anesthesia Start and Stop Event Times     Date Time Event    8/20/2021 1208 Ready for Procedure     1209 Anesthesia Start     2015 Anesthesia Stop        Responsible Staff  08/20/21    Name Role Begin End    Vanessa Bahena M.D. Anesth 1209 2015        Preop Diagnosis (Free Text):  Pre-op Diagnosis             Preop Diagnosis (Codes):    Post op Diagnosis  Pregnancy      Premium Reason  A. 3PM - 7AM    Comments:

## 2021-08-21 NOTE — ANESTHESIA POSTPROCEDURE EVALUATION
Patient: Gwendolyn Masters    Procedure Summary     Date: 08/20/21 Room / Location:     Anesthesia Start: 1209 Anesthesia Stop: 2015    Procedure: Labor Epidural Diagnosis:     Scheduled Providers:  Responsible Provider: Vanessa Bahena M.D.    Anesthesia Type: epidural ASA Status: 2          Final Anesthesia Type: No value filed.  Last vitals  BP   Blood Pressure: 120/81    Temp   36.7 °C (98 °F)    Pulse   72   Resp   19    SpO2   95 %      Anesthesia Post Evaluation    Patient location during evaluation: bedside  Patient participation: complete - patient participated  Level of consciousness: awake and alert  Pain score: 0    Airway patency: patent  Anesthetic complications: no  Cardiovascular status: hemodynamically stable  Respiratory status: acceptable  Hydration status: euvolemic    PONV: none          No complications documented.

## 2021-08-21 NOTE — CARE PLAN
The patient is Stable - Low risk of patient condition declining or worsening    Shift Goals  Clinical Goals: Pain management, work on breastfeeding    Progress made toward(s) clinical / shift goals: Patient pain managed with rest, repositioning and PRN pain medication. Patient continuing to work on breastfeeding infant independently and calls appropriately when needing assistance.    Patient is not progressing towards the following goals:

## 2021-08-21 NOTE — LACTATION NOTE
This note was copied from a baby's chart.  @9567 LC met with MOB for initial visit, MOB stats baby has been breastfeeding frequently, MOB reports some discomfort when breastfeeding, educated on the importance of a deep latch and the damage caused by a shallow latch, LC assisted MOB to latch baby more deeply, a deep latch and nutritive suck were easily achieved, MOB denies discomfort feeding with a deeper latch, POB state baby has voided and stooled    Zoom meeting information provided    MOB denies having any additional questions or concerns for LC at this time    Encouraged to call for assistance as needed

## 2021-08-21 NOTE — PROGRESS NOTES
Patient transferred from labor and delivery in wheelchair with infant in arms to room S342. FOB accompanying with belongings.Two RN verification of infant and parent armbands. Report received from SUDHAKAR George&CATRACHITO MCCARTY. Patient oriented to PP unit and POC for the night. Assessment completed, fundus firm and palpable, lochia light rubra. Patient has not voided yet and encouraged to call when needing to get up to restroom. Pain management discussed with pt. Second bag of pitocin infusing at 125 ml/hr. IV patent, no s/s of infiltration at insertion site. Patient encouraged to call with needs.

## 2021-08-21 NOTE — L&D DELIVERY NOTE
DELIVERY SUMMARY     Patient was admitted to Labor and Delivery at 41w0d for induction of labor due to Prolonged gestation.    FLAKITA CARTER  Is a  , now para: 2, who presented  Not in labor due to prolonged gestation for induction of labor patient had a favorable cervix and was started on Pitocin induction of labor at 41w0d.. GBS status was positive and she was started on penicillin protocol.Patient received epidural for pain management  Patient progressed in active labor with pitocin induction    to Cervical Exam:  100%; cervical dilatation:10, station: +2, to complete the first stage of labor.  Fetal heart rate tracing remained category 1 throughout the labor process.   Patient then progressed through second stage  and delivered, a viable male infant over a small first-degree perineal laceration to sterile field.  Shoulders were delivered without difficulty.  Mouth and nose were bulb suctioned and baby was placed on mom's chest active and crying.  After 1 minute the cord was doubly clamped and cut.  Apgar score:8/9 }  Nuchal Cord: None  Third Stage:Placenta delivered spontaneously intact with 3-Vessel cord.  First-degree laceration was not bleeding and was not repaired  Epidural : positive  Family support: Yes  Infant bonding good:positive  EBL: 200 ml  Sponge count correct: x2  Patient tolerated this procedure well

## 2021-08-21 NOTE — PROGRESS NOTES
Obstetrics & Gynecology Post-Delivery Progress Note    Date of Service  2021    25 y.o.  1 s/p Vaginal, Spontaneous   Delivery date:   Breastfeeding: Yes    Events  No events    Subjective  Pain: No  Bleeding: lochia minimal  PO's: taking regular diet  Voiding: without difficulty  Ambulating: yes  Feeding: breastfeeding well    Objective  Temp:  [36.2 °C (97.2 °F)-36.7 °C (98 °F)] 36.2 °C (97.2 °F)  Pulse:  [57-80] 60  Resp:  [18-19] 18  BP: ()/(51-88) 117/75  SpO2:  [95 %-100 %] 95 %    Physical Exam  General: well and resting   Chest:CTA  CVS:RRR  Chest/Breasts: nipples intact and breasts soft  Fundus: firm, below umbilicus and nontender  Incision: not applicable, (vaginal delivery)  Perineum:intact  Extremities: symmetric and no edema, NT    Lab Results   Component Value Date    RBC 3.83 (L) 2021    ABOGROUP A 2021    RH POS 2021     Immunization History   Administered Date(s) Administered   • Influenza Vaccine Quad Inj (Pf) 2019   • Pneumococcal polysaccharide vaccine (PPSV-23) 2016   • Tdap Vaccine 2018, 2019, 06/15/2021   • Tuberculin Skin Test 2015       Assessment/Plan  Gwendolyn Masters is a 25 y.o.  postpartum day 1 s/p Vaginal, Spontaneous .    1. Post care: meeting all goals  2. Hemodynamics: awaiting CBC  3. Pain: controlled  4. PNL:   Lab Results   Component Value Date    RH POS 2021    RUBELLAIGG 184.00 2021     5. Method of Feeding: plans to breastfeed  6. Method of Contraception: condoms  7. Vaccinations:   Immunization History   Administered Date(s) Administered   • Influenza Vaccine Quad Inj (Pf) 2019   • Pneumococcal polysaccharide vaccine (PPSV-23) 2016   • Tdap Vaccine 2018, 2019, 06/15/2021   • Tuberculin Skin Test 2015     8. Disposition: likely home postpartum day 2    No new Assessment & Plan notes have been filed under this hospital service since the last note was  generated.  Service: Obstetrics & Gynecology           Garrick Cabrera M.D.

## 2021-08-21 NOTE — DISCHARGE PLANNING
Discharge Planning Assessment Post Partum     Reason for Referral: Maternal Hx of THC  Address: [address] [facility] [address]  Type of Living Situation: Apartment with 2 year old girl (same FOB)  Mom Diagnosis: Pregnancy   Baby Diagnosis:   Primary Language: English      Name of Baby: Heir Wells   Mother of the Baby: Gwendolyn Masters ([phone])  Father of the Baby: Joy Wells  Involved in baby’s care? Yes  Contact Information: [phone]     Prenatal Care: Yes, with P[facility] Center   Mom's PCP: None  PCP for new baby: Dr. Zhou     Support System: Yes  Coping/Bonding between mother & baby: Yes  Source of Feeding: Breast   Supplies for Infant: Prepared with bassinet, clothing, diapers, blankets, car seat       Mom's Insurance: [place] Medicaid   Baby Covered on Insurance: MOB's insurance   Mother Employed/School: Yes, both MOB and FOB are employed       Other children in the home/names & ages: Yes, 2 year old girl (same FOB)     Financial Hardship/Income: Denies  Mom's Mental status: Alert and Oriented x 4  Services used prior to admit: Medicaid, WIC and Foodstamps     CPS History: Yes, MOB reported there was a call made to CPS a year ago due to MOB and FOB arguing while their daughter was in the house.  MOB reported a CPS worker came to her home two times and then closed the case.    Psychiatric History: Denies. NIRAV declined Postpartum and Behavioral Health resources and reported she has resources that were given to her by the [facility]. LSW explained the difference between PPD and baby blues and encouraged MOB to reach out if she is experiencing any heightened anxiety or depression.    Domestic Violence History: Denies any abuse   Drug/ETOH History: Denies any current abuse but did report she did smoke marijuana prior to finding out she was pregnant but stopped when she decided she wanted to keep the baby. She was approximately 4 months pregnant. MOB tested positive for THC on  5/4/21.  Baby tested negative on 8/21/21.      ---LSW placed a call to [facility] and spoke with Milana.  LSW reported the above information. Milana reported it was an information report only, [facility] will not be opening a case.       Resources Provided: Children and Family Resources.  MOB declined Postpartum and Behavioral Health resources and reported she has resources that were given to her by the [facility].   Referrals Made: Diaper Referral       Clearance for Discharge: Baby is cleared to discharge home with MOB/FOB upon medical clearance.      Ongoing Plan: Continue to provide support and resources to family until discharge

## 2021-08-22 VITALS
TEMPERATURE: 97.3 F | HEIGHT: 70 IN | SYSTOLIC BLOOD PRESSURE: 100 MMHG | HEART RATE: 64 BPM | WEIGHT: 198 LBS | BODY MASS INDEX: 28.35 KG/M2 | DIASTOLIC BLOOD PRESSURE: 69 MMHG | RESPIRATION RATE: 16 BRPM | OXYGEN SATURATION: 97 %

## 2021-08-22 PROCEDURE — 700102 HCHG RX REV CODE 250 W/ 637 OVERRIDE(OP): Performed by: OBSTETRICS & GYNECOLOGY

## 2021-08-22 PROCEDURE — A9270 NON-COVERED ITEM OR SERVICE: HCPCS | Performed by: OBSTETRICS & GYNECOLOGY

## 2021-08-22 RX ADMIN — PRENATAL WITH FERROUS FUM AND FOLIC ACID 1 TABLET: 3080; 920; 120; 400; 22; 1.84; 3; 20; 10; 1; 12; 200; 27; 25; 2 TABLET ORAL at 11:00

## 2021-08-22 RX ADMIN — IBUPROFEN 600 MG: 600 TABLET, FILM COATED ORAL at 11:00

## 2021-08-22 RX ADMIN — IBUPROFEN 600 MG: 600 TABLET, FILM COATED ORAL at 03:53

## 2021-08-22 ASSESSMENT — EDINBURGH POSTNATAL DEPRESSION SCALE (EPDS)
I HAVE BEEN ANXIOUS OR WORRIED FOR NO GOOD REASON: HARDLY EVER
I HAVE BEEN SO UNHAPPY THAT I HAVE HAD DIFFICULTY SLEEPING: NOT AT ALL
I HAVE BEEN ABLE TO LAUGH AND SEE THE FUNNY SIDE OF THINGS: AS MUCH AS I ALWAYS COULD
I HAVE BEEN SO UNHAPPY THAT I HAVE BEEN CRYING: NO, NEVER
I HAVE LOOKED FORWARD WITH ENJOYMENT TO THINGS: AS MUCH AS I EVER DID
THE THOUGHT OF HARMING MYSELF HAS OCCURRED TO ME: NEVER
THINGS HAVE BEEN GETTING ON TOP OF ME: NO, MOST OF THE TIME I HAVE COPED QUITE WELL
I HAVE FELT SAD OR MISERABLE: NO, NOT AT ALL
I HAVE BLAMED MYSELF UNNECESSARILY WHEN THINGS WENT WRONG: NOT VERY OFTEN
I HAVE FELT SCARED OR PANICKY FOR NO GOOD REASON: NO, NOT AT ALL

## 2021-08-22 ASSESSMENT — PAIN DESCRIPTION - PAIN TYPE
TYPE: ACUTE PAIN
TYPE: ACUTE PAIN

## 2021-08-22 ASSESSMENT — PATIENT HEALTH QUESTIONNAIRE - PHQ9
SUM OF ALL RESPONSES TO PHQ9 QUESTIONS 1 AND 2: 0
1. LITTLE INTEREST OR PLEASURE IN DOING THINGS: NOT AT ALL
2. FEELING DOWN, DEPRESSED, IRRITABLE, OR HOPELESS: NOT AT ALL

## 2021-08-22 NOTE — DISCHARGE SUMMARY
Discharge Summary      Date of Admission: 2021  Date of Discharge: 2021    Admission Dx: Indication for care in labor or delivery [O75.9],   Patient Active Problem List    Diagnosis Date Noted   • Group beta Strep positive 2021   • Breech presentation 2021   • Abnormal pregnancy US 2021   • Depression during pregnancy 2021   • Supervision of other normal pregnancy 2021        Discharge Dx: S/P spontaneous vaginal delivery    Delivery Date: 2021    Past Medical History:   Diagnosis Date   • Depression during pregnancy 2021     Past Surgical History:   Procedure Laterality Date   • LAPAROSCOPIC LYSIS OF ADHESIONS N/A 2016    Procedure: LAPAROSCOPIC LYSIS OF ADHESIONS;  Surgeon: Venecia Hawk M.D.;  Location: SURGERY [facility];  Service:    • APPENDECTOMY LAPAROSCOPIC  2016    Procedure: APPENDECTOMY LAPAROSCOPIC;  Surgeon: Joao Dorantes M.D.;  Location: SURGERY [facility];  Service:    • APPENDECTOMY     • OTHER ORTHOPEDIC SURGERY      meniscus repair - RIGHT     OB History    Para Term  AB Living   3 2 2   1 2   SAB TAB Ectopic Molar Multiple Live Births     1     0 2      # Outcome Date GA Lbr Rainer/2nd Weight Sex Delivery Anes PTL Lv   3 Term 21 41w0d  3.865 kg (8 lb 8.3 oz) M Vag-Spont EPI N CINDI   2 Term 06/15/19 40w1d / 00:25 3.91 kg (8 lb 9.9 oz) F Vag-Spont EPI N CINDI   1 TAB 2019             Allergies: Erythromycin and Morphine    Hospital Course:   25 y.o. now , who presented for induction of labor.  Pt was admitted and underwent vaginal, spontaneous. Patient postpartum course was unremarkable, with progressive advancement in diet, ambulation and toleration of oral analgesia. Patient without complaints today and desires discharge.  Patient unsure what type contraception she would like.    Vitals:    21 1000 21 1400 21 1800 21 0600   BP: 104/70 139/79 120/76 100/69   Pulse: 75 74 75 64    Resp: 16 16 16 16   Temp: 36.4 °C (97.6 °F) 36.4 °C (97.6 °F) 37.1 °C (98.7 °F) 36.3 °C (97.3 °F)   TempSrc: Temporal Temporal Temporal Temporal   SpO2: 99% 98% 97% 97%   Weight:       Height:         Exam:  Gen: AAO, NAD  Abdomen soft, non-tender. BS normal. No masses,  No organomegaly  Incisionnone  Fundus Tenderness: no, Below umbilicus: Yes,   ExtremitiesNormal    Meds:   No current facility-administered medications on file prior to encounter.     Current Outpatient Medications on File Prior to Encounter   Medication Sig Dispense Refill   • Prenatal Vit-Fe Fumarate-FA (PRENATAL VITAMIN) 27-0.8 MG Tab Take 1 tablet by mouth every day. 30 tablet 5       Labs:   Recent Results (from the past 168 hour(s))   SARS-CoV-2 PCR (24 hour In-House): Collect NP swab in VTM    Collection Time: 08/17/21  6:03 PM    Specimen: Nasopharyngeal; Respirate   Result Value Ref Range    SARS-CoV-2 Source NP Swab     SARS-CoV-2 by PCR NotDetected    Hold Blood Bank Specimen (Not Tested)    Collection Time: 08/19/21 10:40 PM   Result Value Ref Range    Holding Tube - Bb DONE    CBC WITH DIFFERENTIAL    Collection Time: 08/19/21 10:40 PM   Result Value Ref Range    WBC 10.9 (H) 4.8 - 10.8 K/uL    RBC 3.83 (L) 4.20 - 5.40 M/uL    Hemoglobin 12.2 12.0 - 16.0 g/dL    Hematocrit 36.6 (L) 37.0 - 47.0 %    MCV 95.6 81.4 - 97.8 fL    MCH 31.9 27.0 - 33.0 pg    MCHC 33.3 (L) 33.6 - 35.0 g/dL    RDW 47.8 35.9 - 50.0 fL    Platelet Count 198 164 - 446 K/uL    MPV 12.1 9.0 - 12.9 fL    Neutrophils-Polys 75.40 (H) 44.00 - 72.00 %    Lymphocytes 17.30 (L) 22.00 - 41.00 %    Monocytes 5.90 0.00 - 13.40 %    Eosinophils 0.50 0.00 - 6.90 %    Basophils 0.40 0.00 - 1.80 %    Immature Granulocytes 0.50 0.00 - 0.90 %    Nucleated RBC 0.00 /100 WBC    Neutrophils (Absolute) 8.26 (H) 2.00 - 7.15 K/uL    Lymphs (Absolute) 1.89 1.00 - 4.80 K/uL    Monos (Absolute) 0.64 0.00 - 0.85 K/uL    Eos (Absolute) 0.05 0.00 - 0.51 K/uL    Baso (Absolute) 0.04 0.00 -  0.12 K/uL    Immature Granulocytes (abs) 0.06 0.00 - 0.11 K/uL    NRBC (Absolute) 0.00 K/uL   HEMOGLOBIN A1C    Collection Time: 08/19/21 10:40 PM   Result Value Ref Range    Glycohemoglobin 5.1 4.0 - 5.6 %    Est Avg Glucose 100 mg/dL   CBC without differential    Collection Time: 08/21/21  7:18 AM   Result Value Ref Range    WBC 11.1 (H) 4.8 - 10.8 K/uL    RBC 3.62 (L) 4.20 - 5.40 M/uL    Hemoglobin 11.6 (L) 12.0 - 16.0 g/dL    Hematocrit 36.8 (L) 37.0 - 47.0 %    .7 (H) 81.4 - 97.8 fL    MCH 32.0 27.0 - 33.0 pg    MCHC 31.5 (L) 33.6 - 35.0 g/dL    RDW 51.7 (H) 35.9 - 50.0 fL    Platelet Count 176 164 - 446 K/uL    MPV 11.9 9.0 - 12.9 fL         normal postpartum course        Discharge Instructions:  Discharge to home  Pelvic rest x6 weeks  Call or come to ED for: heavy vaginal bleeding, fever >100.4, severe abdominal pain, severe headache, chest pain, shortness of breath,  N/V, incisional drainage, foul smelling vaginal discharge, or other concerns.  Follow up: .[facility] in 5 weeks for vaginal delivery;          Medication List      CONTINUE taking these medications      Instructions   Prenatal Vitamin 27-0.8 MG Tabs   Take 1 tablet by mouth every day.  Dose: 1 Tablet              Young Way M.D.

## 2021-08-22 NOTE — CARE PLAN
Problem: Knowledge Deficit - Postpartum  Goal: Patient will verbalize and demonstrate understanding of self and infant care  Outcome: Progressing  Note: Patient able to care for self and infant appropriately.      Problem: Altered Physiologic Condition  Goal: Patient physiologically stable as evidenced by normal lochia, palpable uterine involution and vitals within normal limits  Outcome: Progressing  Note: Fundus firm, lochia light. Vitals WDL.    The patient is Stable - Low risk of patient condition declining or worsening    Shift Goals  Clinical Goals: Maintain tolerable pain level  Patient Goals: Feed infant q3h  Family Goals: Bond

## 2021-08-22 NOTE — DISCHARGE PLANNING
Discharge Planning Assessment Post Partum    Reason for Referral: referral for MOB THC hx, MOB positive and baby negative for THC/UDS    Address: [address] [place] [address]  Type of Living Situation: lives w/ baby alone, as  A 1yo dtr at home too, Cedric Wells  Mom Diagnosis: scheduled induction  Baby Diagnosis: normal   Primary Language: English    Name of Baby: Heir Wells  Father of the Baby: Joy Wells  Involved in baby’s care? Yes, at bedside, lives in [address] [place] w/ his mom  Contact Information: phone [phone], employed, [employer]    Prenatal Care: [facility]  Mom's PCP: [facility]  PCP for new baby:Poppy Zhou    Support System: MOBs sisters in [place], FOB's mother in [place]  Coping/Bonding between mother & baby: MOB holding baby during interview  Source of Feeding: formula probably as breast feeding has not been successful  Supplies for Infant: has everything needed including car seat    Mom's Insurance: [place] Medicaid  Baby Covered on Insurance:pending [place] Medicaid  Mother Employed/School: employed, [employer]  Other children in the home/names & ages: dtr, Cedric Wells 1yo    Financial Hardship/Income: none   Mom's Mental status: A/O x4  Services used prior to admit: [facility]    CPS History: there was a call for noise once during an argument between MOB and FOB they visited then closed case as baby was in home but in another room  Psychiatric History: none  Domestic Violence History: none  Drug/ETOH History: THC    Resources Provided: declined counseling, intp/outpt alcohol/drug rehab, indicated had no need  Referrals Made: Called CPS who indicated the report was made and in system from 21-information only, no case will be opened     Clearance for Discharge: pt may d/c w/ baby     Ongoing Plan:Pt will f/u w/ pediatrician post d/c

## 2021-08-22 NOTE — LACTATION NOTE
This note was copied from a baby's chart.  @2546 LC met with POB for follow-up visit, they state baby has been fussy and not breastfeeding well since circumcision yesterday, MOB is also reporting discomfort when baby does latch, she states they have been supplementing with formula, education provided on supplementation when breastfeeding, baby was 40 weeks gestation at delivery, baby's birth weight was 8# 8.3 oz,his weight loss is 7# 14.6 oz, POB state baby has not been voiding and stoolng very frequently, educated on expected feeding frequency and duration, educated on expected urine and stool output, education provided on burping/infant gas pain, educated on signs that indicate need for supplementation    MOB has WIC, instructed to call her WIC counselor for breastfeeding assistance as needed after discharge, MOB states she has a hand pump for home use, instructed to ask WIC for electric pump if continuing to have latch difficulty/breast pain when breastfeeding    Plan:  Ad lan breastfeeding attempts at least Q 4 hours, more often if feeding cues noted  For no/suboptima feeding or if unable to tolerate breastfeeding/for no void or stool:  Q 3 hours pump for 15 minutes and supplement per hospital guidelines    POB deny having any additional questions or concerns for LC at this time

## 2021-08-22 NOTE — CARE PLAN
The patient is Stable - Low risk of patient condition declining or worsening    Shift Goals  Clinical Goals: Maintain tolerable pain level  Patient Goals: Feed infant q3h  Family Goals: Bond    Progress made toward(s) clinical / shift goals:    Problem: Knowledge Deficit - Postpartum  Goal: Patient will verbalize and demonstrate understanding of self and infant care  Outcome: Progressing  Note: Pt ambulating to bathroom and performing salome care and ADLs.  Pt engaged in care of infant and is breastfeeding.     Problem: Psychosocial - Postpartum  Goal: Patient will verbalize and demonstrate effective bonding and parenting behavior  Outcome: Progressing  Note: Pt and family bonding with baby.     Problem: Altered Physiologic Condition  Goal: Patient physiologically stable as evidenced by normal lochia, palpable uterine involution and vitals within normal limits  Outcome: Progressing     Problem: Infection - Postpartum  Goal: Postpartum patient will be free of signs and symptoms of infection  Outcome: Progressing  Note: VSS. Pt afebrile.     Problem: Respiratory/Oxygenation Function Post-Surgical  Goal: Patient will achieve/maintain normal respiratory rate/effort  Outcome: Progressing     Problem: Bowel Elimination - Post Surgical  Goal: Patient will resume regular bowel sounds and function with no discomfort or distention  Outcome: Progressing     Problem: Early Mobilization - Post Surgery  Goal: Early mobilization post surgery  Outcome: Progressing       Patient is not progressing towards the following goals:

## 2021-08-22 NOTE — DISCHARGE INSTRUCTIONS
PATIENT DISCHARGE EDUCATION INSTRUCTION SHEET  REASONS TO CALL YOUR OBSTETRICIAN  · Persistent fever, shaking, chills (Temperature higher than 100.4) may indicate you have an infection  · Heavy bleeding: soaking more than 1 pad per hour; Passing clots an egg-sized clot or bigger may mean you have an postpartum hemorrhage  · Foul odor from vagina or bad smelling or discolored discharge or blood  · Breast infection (Mastitis symptoms); breast pain, chills, fever, redness or red streaks, may feel flu like symptoms  · Urinary pain, burning or frequency  · Incision that is not healing, increased redness, swelling, tenderness or pain, or any pus from episiotomy or  site may mean you have an infection  · Redness, swelling, warmth, or painful to touch in the calf area of your leg may mean you have a blood clot  · Severe or intensified depression, thoughts or feelings of wanting to hurt yourself or someone else   · Pain in chest, obstructed breathing or shortness of breath (trouble catching your breath) may mean you are having a postpartum complication. Call your provider immediately   · Headache that does not get better, even after taking medicine, a bad headache with vision changes or pain in the upper right area of your belly may mean you have high blood pressure or post birth preeclampsia. Call your provider immediately    HAND WASHING  All family and friends should wash their hands:  · Before and after holding the baby  · Before feeding the baby  · After using the restroom or changing the baby's diaper    WOUND CARE  Ask your physician for additional care instructions. In general:  ·  Incision:  · May shower and pat incision dry   · Keep the incision clean and dry  · There should not be any opening or pus from the incision  · Continue to walk at home 3 times a day   · Do NOT lift anything heavier than your baby (over 10 pounds)  · Encourage family to help participate in care of the  to allow  rest and mom time to heal  · Episiotomy/Laceration  · May use salome-spray bottle, witch hazel pads and dermaplast spray for comfort  · Use salome-spray bottle after urinating to cleanse perineal area  · To prevent burning during urination spray salome-water bottle on labial area   · Pat perineal area dry until episiotomy/laceration is healed  · Continue to use salome-bottle until bleeding stops as needed  · If have a 2nd degree laceration or greater, a Sitz bath can offer relief from soreness, burning, and inflammation   · Sitz Bath   · Sit in 6 inches of warm water and soak laceration as needed until the laceration heals    VAGINAL CARE AND BLEEDING  · Nothing inside vagina for 6 weeks:   · No sexual intercourse, tampons or douching  · Bleeding may continue for 2-4 weeks. Amount and color may vary  · Soaking 1 pad or more in an hour for several hours is considered heavy bleeding  · Passing large egg sized blood clots can be concerning  · If you feel like you have heavy bleeding or are having increasing amount of blood clots call your Obstetrician immediately  · If you begin feeling faint upon standing, feeling sick to your stomach, have clammy skin, a really fast heartbeat, have chills, start feeling confused, dizzy, sleepy or weak, or feeling like you're going to faint call your Obstetrician immediately    HYPERTENSION   Preeclampsia or gestational hypertension are types of high blood pressure that only pregnant women can get. It is important for you to be aware of symptoms to seek early intervention and treatment. If you have any of these symptoms immediately call your Obstetrician    · Vision changes or blurred vision   · Severe headache or pain that is unrelieved with medication and will not go away  · Persistent pain in upper abdomen or shoulder   · Increased swelling of face, feet, or hands  · Difficulty breathing or shortness of breath at rest  · Urinating less than usual    URINATION AND BOWEL MOVEMENTS  · Eating  "more fiber (bran cereal, fruits, and vegetables) and drinking plenty of fluids will help to avoid constipation  · Urinary frequency and urgency after childbirth is normal  · If you experience any urinary pain, burning or frequency call your provider    BIRTH CONTROL  · It is possible to become pregnant at any time after delivery and while breastfeeding  · Plan to discuss a method of birth control with your physician at your post delivery follow up visit    POSTPARTUM BLUES  During the first few days after birth, you may experience a sense of the \"blues\" which may include impatience, irritability or even crying. These feelings come and go quickly. However, as many as 1 in 10 women experience emotional symptoms known as postpartum depression.     POSTPARTUM DEPRESSION    May start as early as the second or third day after delivery or take several weeks or months to develop. Symptoms of \"blues\" are present, but are more intense: Crying spells; loss of appetite; feelings of hopelessness or loss of control; fear of touching the baby; over concern or no concern at all about the baby; little or no concern about your own appearance/caring for yourself; and/or inability to sleep or excessive sleeping. Contact your Obstetrician if you are experiencing any of these symptoms     PREVENTING SHAKEN BABY  If you are angry or stressed, PUT THE BABY IN THE CRIB, step away, take some deep breaths, and wait until you are calm to care for the baby. DO NOT SHAKE THE BABY. You are not alone, call a supporter for help.  · Crisis [facility] Center 24/7 crisis call line (354-323-5285) or (1-606.675.5500)  · You can also text them, text \"ANSWER\" (974417)    Additional info:  Call or come to ED for: heavy vaginal bleeding, fever >100.4, severe abdominal pain, severe headache, chest pain, shortness of breath,  N/V, incisional drainage, foul smelling vaginal discharge, or other concerns.  Follow up: .[facility] in 5 weeks for vaginal " delivery;

## 2021-08-23 NOTE — PROGRESS NOTES
I gave pt her discharge sleep sack . Patient education and discharge instructions reviewed with patient and FOB by Elke.   NBS, bilizap and pre/post completed, removed cord clamp and cuddles, bands verified  Patient verbalized understanding of instructions with me.  Patient states all questions have been answered and denies any problems. Patient discharged home in stable condition with all belongings. Carseat checked by myself and CNA escorted out for discharge.

## 2021-08-23 NOTE — PROGRESS NOTES
Nursing assessment done.  Plan of care discussed and pt progressing according to caremap.  FOB at bedside and supportive.  Reviewed infant security measures with pt per hospital protocol.  Advised of emergency cords in pt's room.  Pt encouraged to wear supportive bra.  Using tucks and spray to slightly edematous perineum and 1st degree from L&D.  Pt ambulating well.  See MAR for pain med administration. Pt encouraged to call for any needs.

## 2021-09-17 ENCOUNTER — TELEPHONE (OUTPATIENT)
Dept: OBGYN | Facility: CLINIC | Age: 26
End: 2021-09-17

## 2021-09-17 NOTE — TELEPHONE ENCOUNTER
Patient called stating needs a letter that she delivered 8/20/21 and started her maternity leave before delivery date and is ready to return to work.     I did informed patient that to be cleared to return to work she needs to have her post partum appointment. Patient stated she does have PP appt 9/24/2021 and will ask for note at her appointment.     Patient understood No further questions asked.

## 2021-10-22 ENCOUNTER — GYNECOLOGY VISIT (OUTPATIENT)
Dept: OBGYN | Facility: CLINIC | Age: 26
End: 2021-10-22
Payer: MEDICAID

## 2021-10-22 VITALS
HEIGHT: 70 IN | DIASTOLIC BLOOD PRESSURE: 68 MMHG | WEIGHT: 188 LBS | SYSTOLIC BLOOD PRESSURE: 102 MMHG | BODY MASS INDEX: 26.92 KG/M2

## 2021-10-22 PROCEDURE — 0503F POSTPARTUM CARE VISIT: CPT | Mod: 25 | Performed by: NURSE PRACTITIONER

## 2021-10-22 PROCEDURE — 96372 THER/PROPH/DIAG INJ SC/IM: CPT | Performed by: NURSE PRACTITIONER

## 2021-10-22 RX ORDER — MEDROXYPROGESTERONE ACETATE 150 MG/ML
150 INJECTION, SUSPENSION INTRAMUSCULAR ONCE
Status: COMPLETED | OUTPATIENT
Start: 2021-10-22 | End: 2021-10-22

## 2021-10-22 RX ADMIN — MEDROXYPROGESTERONE ACETATE 150 MG: 150 INJECTION, SUSPENSION INTRAMUSCULAR at 11:03

## 2021-10-22 ASSESSMENT — ENCOUNTER SYMPTOMS
MUSCULOSKELETAL NEGATIVE: 1
PSYCHIATRIC NEGATIVE: 1
GASTROINTESTINAL NEGATIVE: 1
EYES NEGATIVE: 1
CONSTITUTIONAL NEGATIVE: 1
CARDIOVASCULAR NEGATIVE: 1
NEUROLOGICAL NEGATIVE: 1
RESPIRATORY NEGATIVE: 1

## 2021-10-22 ASSESSMENT — FIBROSIS 4 INDEX: FIB4 SCORE: 0.98

## 2021-10-22 NOTE — LETTER
October 22, 2021      Gwendolyn Masters is currently postpartum and being cared for by [facility]. She is cleared to return to work after her delivery. She left on maternity leave 4/6/21. She was cleared to return to work on 10/2/21.        Thank you,          SALVATORE Banuelos.    Electronically Signed

## 2021-10-22 NOTE — PROGRESS NOTES
"Subjective     Gwendolyn Masters is a 26 y.o. female who presents with Gynecologic Exam (Missed PP)            S   27 y/o now  s/p  on 21 of baby without complications. No laceration. Now 8 weeks postpartum. Prenatal course significant for no issues. Postpartum course without any complications. Feeling well and happy with baby, denies any severe mood swings or s/sx of postpartum depression and anxiety.    Baby is doing well, formula exclusively.   Has not resumed sexual activity.  Mother reports no issues with bowel or bladder routine, continued regular diet. Bleeding since birth has subsided at this time with no ?return to menses about five weeks after delivery that was lighter for her. No vaginal pain/odor/itching, fever, headaches, dizziness/SOB or dysuria. Desires depo for contraception.     O  See PE: Physical exam today with no abnormal findings  Vital signs WNL: BP and weight     PAP: NILM 2019    A  Reassuring exam of postpartum woman s/p  on 21    P  - Rx depo for contraception//follow up with us if she likes this method   - Reviewed back up method, late shot etc  - Resumption of sexual activity: safe sex precautions given  - Counseling on nutrition, adequate hydration, and exercise   - Kegel Exercises for pelvic floor/prevention of urinary incontinence   - Continue PNV for breastfeeding mother  - Counseling on PAP guidelines re: next PAP due   - Warning s/sx of postpartum infection, depression, preeclampsia   - RTC PRN             Review of Systems   Constitutional: Negative.    HENT: Negative.    Eyes: Negative.    Respiratory: Negative.    Cardiovascular: Negative.    Gastrointestinal: Negative.    Genitourinary: Negative.    Musculoskeletal: Negative.    Skin: Negative.    Neurological: Negative.    Endo/Heme/Allergies: Negative.    Psychiatric/Behavioral: Negative.               Objective     /68   Ht 1.778 m (5' 10\")   Wt 85.3 kg (188 lb)   BMI 26.98 kg/m²  "     Physical Exam  Constitutional:       Appearance: Normal appearance.   HENT:      Head: Normocephalic and atraumatic.      Nose: Nose normal.      Mouth/Throat:      Mouth: Mucous membranes are dry.   Eyes:      Pupils: Pupils are equal, round, and reactive to light.   Cardiovascular:      Rate and Rhythm: Normal rate and regular rhythm.      Pulses: Normal pulses.      Heart sounds: Normal heart sounds.   Pulmonary:      Effort: Pulmonary effort is normal.      Breath sounds: Normal breath sounds.   Abdominal:      General: Abdomen is flat.      Palpations: Abdomen is soft.   Musculoskeletal:      Cervical back: Normal range of motion and neck supple.   Neurological:      Mental Status: She is alert and oriented to person, place, and time.   Psychiatric:         Mood and Affect: Mood normal.         Behavior: Behavior normal.         Thought Content: Thought content normal.         Judgment: Judgment normal.                             Assessment & Plan        There are no diagnoses linked to this encounter.

## 2021-10-22 NOTE — PROGRESS NOTES
CALLING WITH LOWER RIGHT ABDOMEN PAIN GOING ON FOR 2-3 DAYS  NO DIRRHEA OR VOMTING     Alicia Plaza LPN on 12/22/2020 at 10:06 AM     Chris  NDC: 7996-3876-81  LOT#: [id number]  Expiration Date: 2023  Dose: 150mg  Site: Left Deltoid  Patient educated on use and side effects of medication. Name and  verified prior to injection. Pt tolerated? Well   Verified by RHEA  Administered by Bill Castillo Ass't at 10:58 AM.  Patient Provided Medication: no  Next Dose due 2021-2021

## 2021-10-22 NOTE — NON-PROVIDER
Pt here today for postpartum exam.  Delivery type: 8/20/21 Vaginal  Currently : Bottle feeding   Self pay: [facility] for [facility] given to patient.  Desired BCM: Pt would like to discuss options   LMP: 10/15/21  Last pap: 1/22/19 Negative  [phone] (home)

## 2021-12-20 NOTE — TELEPHONE ENCOUNTER
Pt called and LVM stating that she wants to know her next steps, as she was due yesterday and nothing is going on. Called back and LVMTCB.  
70

## 2023-07-26 ENCOUNTER — HOSPITAL ENCOUNTER (EMERGENCY)
Facility: MEDICAL CENTER | Age: 28
End: 2023-07-27
Attending: STUDENT IN AN ORGANIZED HEALTH CARE EDUCATION/TRAINING PROGRAM
Payer: MEDICAID

## 2023-07-26 DIAGNOSIS — N30.00 ACUTE CYSTITIS WITHOUT HEMATURIA: ICD-10-CM

## 2023-07-26 DIAGNOSIS — E87.6 HYPOKALEMIA: ICD-10-CM

## 2023-07-26 DIAGNOSIS — R10.11 RIGHT UPPER QUADRANT ABDOMINAL PAIN: ICD-10-CM

## 2023-07-26 PROCEDURE — 84703 CHORIONIC GONADOTROPIN ASSAY: CPT

## 2023-07-26 PROCEDURE — 85025 COMPLETE CBC W/AUTO DIFF WBC: CPT

## 2023-07-26 PROCEDURE — 83690 ASSAY OF LIPASE: CPT

## 2023-07-26 PROCEDURE — 80053 COMPREHEN METABOLIC PANEL: CPT

## 2023-07-26 PROCEDURE — 99284 EMERGENCY DEPT VISIT MOD MDM: CPT

## 2023-07-26 PROCEDURE — 36415 COLL VENOUS BLD VENIPUNCTURE: CPT

## 2023-07-26 ASSESSMENT — PAIN DESCRIPTION - DESCRIPTORS: DESCRIPTORS: SHARP

## 2023-07-26 NOTE — Clinical Note
Gwendolyn Masters was seen and treated in our emergency department on 7/26/2023.  She may return to work on 07/29/2023.       If you have any questions or concerns, please don't hesitate to call.      Tracie Abrams M.D.

## 2023-07-27 ENCOUNTER — APPOINTMENT (OUTPATIENT)
Dept: RADIOLOGY | Facility: MEDICAL CENTER | Age: 28
End: 2023-07-27
Attending: STUDENT IN AN ORGANIZED HEALTH CARE EDUCATION/TRAINING PROGRAM
Payer: MEDICAID

## 2023-07-27 VITALS
WEIGHT: 177.69 LBS | RESPIRATION RATE: 18 BRPM | HEART RATE: 90 BPM | BODY MASS INDEX: 25.44 KG/M2 | HEIGHT: 70 IN | TEMPERATURE: 97.2 F | OXYGEN SATURATION: 99 % | SYSTOLIC BLOOD PRESSURE: 120 MMHG | DIASTOLIC BLOOD PRESSURE: 85 MMHG

## 2023-07-27 LAB
ALBUMIN SERPL BCP-MCNC: 4.6 G/DL (ref 3.2–4.9)
ALBUMIN/GLOB SERPL: 1.7 G/DL
ALP SERPL-CCNC: 53 U/L (ref 30–99)
ALT SERPL-CCNC: 17 U/L (ref 2–50)
ANION GAP SERPL CALC-SCNC: 15 MMOL/L (ref 7–16)
APPEARANCE UR: CLEAR
AST SERPL-CCNC: 17 U/L (ref 12–45)
BACTERIA #/AREA URNS HPF: ABNORMAL /HPF
BASOPHILS # BLD AUTO: 1 % (ref 0–1.8)
BASOPHILS # BLD: 0.07 K/UL (ref 0–0.12)
BILIRUB SERPL-MCNC: 0.5 MG/DL (ref 0.1–1.5)
BILIRUB UR QL STRIP.AUTO: NEGATIVE
BUN SERPL-MCNC: 15 MG/DL (ref 8–22)
CALCIUM ALBUM COR SERPL-MCNC: 9.1 MG/DL (ref 8.5–10.5)
CALCIUM SERPL-MCNC: 9.6 MG/DL (ref 8.4–10.2)
CHLORIDE SERPL-SCNC: 103 MMOL/L (ref 96–112)
CO2 SERPL-SCNC: 20 MMOL/L (ref 20–33)
COLOR UR: YELLOW
CREAT SERPL-MCNC: 0.77 MG/DL (ref 0.5–1.4)
EOSINOPHIL # BLD AUTO: 0.14 K/UL (ref 0–0.51)
EOSINOPHIL NFR BLD: 1.9 % (ref 0–6.9)
EPI CELLS #/AREA URNS HPF: ABNORMAL /HPF
ERYTHROCYTE [DISTWIDTH] IN BLOOD BY AUTOMATED COUNT: 45.3 FL (ref 35.9–50)
GFR SERPLBLD CREATININE-BSD FMLA CKD-EPI: 108 ML/MIN/1.73 M 2
GLOBULIN SER CALC-MCNC: 2.7 G/DL (ref 1.9–3.5)
GLUCOSE SERPL-MCNC: 82 MG/DL (ref 65–99)
GLUCOSE UR STRIP.AUTO-MCNC: NEGATIVE MG/DL
HCG SERPL QL: NEGATIVE
HCT VFR BLD AUTO: 41.6 % (ref 37–47)
HGB BLD-MCNC: 13.8 G/DL (ref 12–16)
IMM GRANULOCYTES # BLD AUTO: 0.02 K/UL (ref 0–0.11)
IMM GRANULOCYTES NFR BLD AUTO: 0.3 % (ref 0–0.9)
KETONES UR STRIP.AUTO-MCNC: NEGATIVE MG/DL
LEUKOCYTE ESTERASE UR QL STRIP.AUTO: NEGATIVE
LIPASE SERPL-CCNC: 37 U/L (ref 11–82)
LYMPHOCYTES # BLD AUTO: 2.96 K/UL (ref 1–4.8)
LYMPHOCYTES NFR BLD: 40.9 % (ref 22–41)
MCH RBC QN AUTO: 31 PG (ref 27–33)
MCHC RBC AUTO-ENTMCNC: 33.2 G/DL (ref 32.2–35.5)
MCV RBC AUTO: 93.5 FL (ref 81.4–97.8)
MICRO URNS: ABNORMAL
MONOCYTES # BLD AUTO: 0.63 K/UL (ref 0–0.85)
MONOCYTES NFR BLD AUTO: 8.7 % (ref 0–13.4)
NEUTROPHILS # BLD AUTO: 3.41 K/UL (ref 1.82–7.42)
NEUTROPHILS NFR BLD: 47.2 % (ref 44–72)
NITRITE UR QL STRIP.AUTO: POSITIVE
NRBC # BLD AUTO: 0 K/UL
NRBC BLD-RTO: 0 /100 WBC (ref 0–0.2)
PH UR STRIP.AUTO: 5.5 [PH] (ref 5–8)
PLATELET # BLD AUTO: 213 K/UL (ref 164–446)
PMV BLD AUTO: 11.3 FL (ref 9–12.9)
POTASSIUM SERPL-SCNC: 3.4 MMOL/L (ref 3.6–5.5)
PROT SERPL-MCNC: 7.3 G/DL (ref 6–8.2)
PROT UR QL STRIP: NEGATIVE MG/DL
RBC # BLD AUTO: 4.45 M/UL (ref 4.2–5.4)
RBC # URNS HPF: ABNORMAL /HPF
RBC UR QL AUTO: NEGATIVE
SODIUM SERPL-SCNC: 138 MMOL/L (ref 135–145)
SP GR UR STRIP.AUTO: >=1.03
WBC # BLD AUTO: 7.2 K/UL (ref 4.8–10.8)
WBC #/AREA URNS HPF: ABNORMAL /HPF

## 2023-07-27 PROCEDURE — 74177 CT ABD & PELVIS W/CONTRAST: CPT

## 2023-07-27 PROCEDURE — 81001 URINALYSIS AUTO W/SCOPE: CPT

## 2023-07-27 PROCEDURE — 700102 HCHG RX REV CODE 250 W/ 637 OVERRIDE(OP): Performed by: STUDENT IN AN ORGANIZED HEALTH CARE EDUCATION/TRAINING PROGRAM

## 2023-07-27 PROCEDURE — 76705 ECHO EXAM OF ABDOMEN: CPT

## 2023-07-27 PROCEDURE — A9270 NON-COVERED ITEM OR SERVICE: HCPCS | Performed by: STUDENT IN AN ORGANIZED HEALTH CARE EDUCATION/TRAINING PROGRAM

## 2023-07-27 PROCEDURE — 700117 HCHG RX CONTRAST REV CODE 255: Performed by: STUDENT IN AN ORGANIZED HEALTH CARE EDUCATION/TRAINING PROGRAM

## 2023-07-27 RX ORDER — POTASSIUM CHLORIDE 20 MEQ/1
20 TABLET, EXTENDED RELEASE ORAL ONCE
Status: COMPLETED | OUTPATIENT
Start: 2023-07-27 | End: 2023-07-27

## 2023-07-27 RX ORDER — NITROFURANTOIN 25; 75 MG/1; MG/1
100 CAPSULE ORAL ONCE
Status: COMPLETED | OUTPATIENT
Start: 2023-07-27 | End: 2023-07-27

## 2023-07-27 RX ORDER — NITROFURANTOIN 25; 75 MG/1; MG/1
100 CAPSULE ORAL 2 TIMES DAILY
Qty: 9 CAPSULE | Refills: 0 | Status: ACTIVE | OUTPATIENT
Start: 2023-07-27 | End: 2023-08-01

## 2023-07-27 RX ADMIN — IOHEXOL 100 ML: 350 INJECTION, SOLUTION INTRAVENOUS at 02:05

## 2023-07-27 RX ADMIN — NITROFURANTOIN MONOHYDRATE/MACROCRYSTALLINE 100 MG: 25; 75 CAPSULE ORAL at 02:30

## 2023-07-27 RX ADMIN — POTASSIUM CHLORIDE 20 MEQ: 20 TABLET, EXTENDED RELEASE ORAL at 02:38

## 2023-07-27 NOTE — ED TRIAGE NOTES
"Chief Complaint   Patient presents with    Abdominal Pain     PT presents d/t right middle abdominal pain. PT states that the pain is directly below her appendectomy scar. PT notes the pain is worse whenever she uses her abdominal muscles.      BP (!) 139/105   Pulse 93   Temp 36.2 °C (97.2 °F) (Temporal)   Resp 18   Ht 1.778 m (5' 10\")   Wt 80.6 kg (177 lb 11.1 oz)   SpO2 99%   BMI 25.50 kg/m²     "

## 2023-07-27 NOTE — ED PROVIDER NOTES
ED Provider Note    CHIEF COMPLAINT  Chief Complaint   Patient presents with    Abdominal Pain     PT presents d/t right middle abdominal pain. PT states that the pain is directly below her appendectomy scar. PT notes the pain is worse whenever she uses her abdominal muscles.        EXTERNAL RECORDS REVIEWED  Other Patient seen for postpartum care following vaginal delivery 10/22/2021    HPI/ROS  LIMITATION TO HISTORY   Select: : None  OUTSIDE HISTORIAN(S):  Friend      Gwendolyn Masters is a 27 y.o. female with history of appendectomy who presents right mid abdominal pain that started a couple weeks ago.  She notes that pain started increasing over the past 3 days it is worse if she picks up her son or if her toddler son accidentally kicks her in the abdomen. The pain is right below the right upper laparoscopic appendectomy scar. She has some nausea but denies vomiting.  Pain is not changed with eating.  She denies dysuria, vaginal discharge, vaginal bleeding.  She denies any chance of being pregnant as she is not sexually active.  She has normal bowel movements with last one today.  She has not taken anything for pain.  She does note that she has had appendectomy in 2016 that was complicated by what she thinks was an ileus and required a bowel resection.  She notes that she takes prenatal vitamins not because she is pregnant but just to stay healthy.     PAST MEDICAL HISTORY   has a past medical history of Depression during pregnancy (5/20/2021).    SURGICAL HISTORY   has a past surgical history that includes appendectomy laparoscopic (7/26/2016); laparoscopic lysis of adhesions (N/A, 9/4/2016); other orthopedic surgery; and appendectomy.    FAMILY HISTORY  Family History   Problem Relation Age of Onset    Diabetes Paternal Grandmother     Cancer Paternal Grandmother         breast    Cancer Paternal Aunt         ovarian       SOCIAL HISTORY  Social History     Tobacco Use    Smoking status: Former     Packs/day:  "0.15     Years: 1.00     Pack years: 0.15     Types: Cigarettes     Quit date: 2017     Years since quittin.2    Smokeless tobacco: Never   Vaping Use    Vaping Use: Former    Substances: Flavoring    Devices: Disposable   Substance and Sexual Activity    Alcohol use: No    Drug use: No     Types: Marijuana    Sexual activity: Not Currently     Partners: Male       CURRENT MEDICATIONS  Home Medications       Reviewed by Nikos Oates R.N. (Registered Nurse) on 23 at 2342  Med List Status: Not Addressed     Medication Last Dose Status   Prenatal Vit-Fe Fumarate-FA (PRENATAL VITAMIN) 27-0.8 MG Tab  Active                    ALLERGIES  Allergies   Allergen Reactions    Erythromycin Unspecified     \"Childhood allergie\".      Morphine Hives       PHYSICAL EXAM  VITAL SIGNS: BP (!) 139/105   Pulse 93   Temp 36.2 °C (97.2 °F) (Temporal)   Resp 18   Ht 1.778 m (5' 10\")   Wt 80.6 kg (177 lb 11.1 oz)   SpO2 99%   BMI 25.50 kg/m²      Constitutional: Well developed, Well nourished, No acute distress, Non-toxic appearance.   HEENT: Normocephalic, Atraumatic,  external ears normal, pharynx pink,  Mucous  Membranes moist, No rhinorrhea or mucosal edema  Eyes: PERRL, EOMI, Conjunctiva normal, No discharge.   Neck: Normal range of motion, No tenderness, Supple, No stridor.   Lymphatic: No lymphadenopathy    Cardiovascular: Regular Rate and Rhythm, No murmurs,  rubs, or gallops.   Thorax & Lungs: Lungs clear to auscultation bilaterally, No respiratory distress, No wheezes, rhales or rhonchi, No chest wall tenderness.   Abdomen: Bowel sounds normal, Soft, tenderness to right upper quadrant, negative Philippe sign, non distended,  No pulsatile masses., no rebound guarding or peritoneal signs.   Skin: Warm, Dry, No erythema, No rash,   Back:  No CVA tenderness,  No spinal tenderness, bony crepitance step offs or instability.   Extremities: Equal, intact distal pulses, No cyanosis, clubbing or edema,  No " tenderness.   Musculoskeletal: Good range of motion in all major joints. No tenderness to palpation or major deformities noted.   Neurologic: Alert & oriented x 3, Cranial nerves II-XII intact, Equal strength and sensation upper and lower extremities bilaterally,  No focal deficits noted.   Psychiatric: Affect normal, Judgment normal, Mood normal. No suicidal or homicidal ideation      DIAGNOSTIC STUDIES / PROCEDURES    LABS  Results for orders placed or performed during the hospital encounter of 07/26/23   CBC WITH DIFFERENTIAL   Result Value Ref Range    WBC 7.2 4.8 - 10.8 K/uL    RBC 4.45 4.20 - 5.40 M/uL    Hemoglobin 13.8 12.0 - 16.0 g/dL    Hematocrit 41.6 37.0 - 47.0 %    MCV 93.5 81.4 - 97.8 fL    MCH 31.0 27.0 - 33.0 pg    MCHC 33.2 32.2 - 35.5 g/dL    RDW 45.3 35.9 - 50.0 fL    Platelet Count 213 164 - 446 K/uL    MPV 11.3 9.0 - 12.9 fL    Neutrophils-Polys 47.20 44.00 - 72.00 %    Lymphocytes 40.90 22.00 - 41.00 %    Monocytes 8.70 0.00 - 13.40 %    Eosinophils 1.90 0.00 - 6.90 %    Basophils 1.00 0.00 - 1.80 %    Immature Granulocytes 0.30 0.00 - 0.90 %    Nucleated RBC 0.00 0.00 - 0.20 /100 WBC    Neutrophils (Absolute) 3.41 1.82 - 7.42 K/uL    Lymphs (Absolute) 2.96 1.00 - 4.80 K/uL    Monos (Absolute) 0.63 0.00 - 0.85 K/uL    Eos (Absolute) 0.14 0.00 - 0.51 K/uL    Baso (Absolute) 0.07 0.00 - 0.12 K/uL    Immature Granulocytes (abs) 0.02 0.00 - 0.11 K/uL    NRBC (Absolute) 0.00 K/uL   COMP METABOLIC PANEL   Result Value Ref Range    Sodium 138 135 - 145 mmol/L    Potassium 3.4 (L) 3.6 - 5.5 mmol/L    Chloride 103 96 - 112 mmol/L    Co2 20 20 - 33 mmol/L    Anion Gap 15.0 7.0 - 16.0    Glucose 82 65 - 99 mg/dL    Bun 15 8 - 22 mg/dL    Creatinine 0.77 0.50 - 1.40 mg/dL    Calcium 9.6 8.4 - 10.2 mg/dL    Correct Calcium 9.1 8.5 - 10.5 mg/dL    AST(SGOT) 17 12 - 45 U/L    ALT(SGPT) 17 2 - 50 U/L    Alkaline Phosphatase 53 30 - 99 U/L    Total Bilirubin 0.5 0.1 - 1.5 mg/dL    Albumin 4.6 3.2 - 4.9 g/dL     Total Protein 7.3 6.0 - 8.2 g/dL    Globulin 2.7 1.9 - 3.5 g/dL    A-G Ratio 1.7 g/dL   LIPASE   Result Value Ref Range    Lipase 37 11 - 82 U/L   URINALYSIS CULTURE, IF INDICATED    Specimen: Urine, Clean Catch   Result Value Ref Range    Color Yellow     Character Clear     Specific Gravity >=1.030 <1.035    Ph 5.5 5.0 - 8.0    Glucose Negative Negative mg/dL    Ketones Negative Negative mg/dL    Protein Negative Negative mg/dL    Bilirubin Negative Negative    Nitrite Positive (A) Negative    Leukocyte Esterase Negative Negative    Occult Blood Negative Negative    Micro Urine Req Microscopic    HCG QUAL SERUM   Result Value Ref Range    Beta-Hcg Qualitative Serum Negative Negative   ESTIMATED GFR   Result Value Ref Range    GFR (CKD-EPI) 108 >60 mL/min/1.73 m 2   URINE MICROSCOPIC (W/UA)   Result Value Ref Range    WBC 5-10 (A) /hpf    RBC Rare /hpf    Bacteria Moderate (A) None /hpf    Epithelial Cells Few Few /hpf         RADIOLOGY  I have independently interpreted the diagnostic imaging associated with this visit and am waiting the final reading from the radiologist.   My preliminary interpretation is as follows: no gallstones  Radiologist interpretation:   CT-ABDOMEN-PELVIS WITH   Final Result         1.  No acute intra-abdominal abnormality identified   2.  Hepatomegaly   3.  3.5 cm supraumbilical fat-containing hernia      US-RUQ   Final Result         1.  Hepatomegaly          COURSE & MEDICAL DECISION MAKING    ED Observation Status? Yes; I am placing the patient in to an observation status due to a diagnostic uncertainty as well as therapeutic intensity. Patient placed in observation status at 11:41 PM, 7/26/2023.     Observation plan is as follows: labs, imaging, symptom control    Upon Reevaluation, the patient's condition has: Improved; and will be discharged.    Patient discharged from ED Observation status at 228 (Time) 07/27/2023 (Date).     INITIAL ASSESSMENT, COURSE AND PLAN  Care Narrative:  This is a 27-year-old female who presents with right upper quadrant abdominal pain that started a couple weeks ago but has been increasing in intensity.  She has associated nausea without vomiting.  Normal bowel movements.  No association with food.  On arrival, her vital signs are stable.  She does have right upper quadrant tenderness to palpation with negative Philippe sign.  She has no lower quadrant abdominal tenderness to palpation.  She is not sexually active and has no concern for STIs.  Her abdominal pain is upper and not lower Therefore I doubt ovarian pathology.    Labs without leukocytosis.  She is not anemic.  Electrolytes show slight hypokalemia to 3.4 but otherwise electrolytes are within normal limits.  Her renal function is normal.  Lipase is negative, therefore less likely pancreatitis.  LFTs are all within normal limits.  There is no elevation in bilirubin.  UA is nitrate positive with 5-10 white blood cells.  Patient has no symptoms of urinary tract infection but since urine is nitrite positive will treat with 5-day course of Macrobid.  Patient is not pregnant. Potassium noted to be 3.4 therefore this was repleted.     Right upper quadrant ultrasound shows no evidence of gallbladder pathology but does demonstrate hepatomegaly.  Patient was reevaluated and she states her pain is still about the same.  She declined any pain medications.  I reviewed ultrasound results with the patient which show hepatomegaly.  She has no history of enlarged liver.  She states that she previously used to drink heavily but now only drinks once a month.  Her lab test are not consistent with hepatitis.  She has no history of hepatitis.  Shared decision making done with patient as I discussed that CT abdomen does have risk of radiation but we can do this to further evaluate her pain.  Patient feels more comfortable with doing CT abdomen given she does have bouts of severe pain.    CT abdomen obtained and shows no acute  intra-abdominal abnormality.  It also demonstrates hepatomegaly with supraumbilical fat-containing hernia.  Patient was reevaluated and she remained stable.  She states her pain is minimal.  Discussed CT findings with patient.    Patient and friend are agreeable patient will discuss this with her primary care doctor at appointment that is upcoming this month.  She was given strict ER return precautions.  Prescription for Macrobid was sent to her pharmacy. Patient and friend are agreeable to discharge plan with no further questions.           DISPOSITION AND DISCUSSIONS  Patient discharged home in stable condition with prescription for Macrobid.  She was given lab and imaging results to follow-up with her primary care doctor in a few weeks.  Strict ER return precautions were discussed.    FINAL DIAGNOSIS  1. Right upper quadrant abdominal pain    2. Acute cystitis without hematuria    3. Hypokalemia      Electronically signed by: Tracie Abrams M.D., 7/26/2023 11:41 PM

## 2023-07-27 NOTE — ED NOTES
Pt stated that she is having URQ abdominal pain for about a few days.     Pt stated that right now it feels like a sore muscle but usually has trouble picking up her son, with sharp stabbing pain.     2/10 pain lvl right now    9/10 when picking something heavy.   Pt also stated that she has also felt N/V but not at the moment.

## 2023-07-27 NOTE — DISCHARGE INSTRUCTIONS
You were seen in the emergency room for abdominal pain.  Please see imaging results below.  Your urine does show signs of infection therefore we will treat with a course of antibiotics.  Please follow-up with your doctor.  Return to the ER for any fever, worsening pain or any other concerns.    Results for orders placed or performed during the hospital encounter of 07/26/23   CBC WITH DIFFERENTIAL   Result Value Ref Range    WBC 7.2 4.8 - 10.8 K/uL    RBC 4.45 4.20 - 5.40 M/uL    Hemoglobin 13.8 12.0 - 16.0 g/dL    Hematocrit 41.6 37.0 - 47.0 %    MCV 93.5 81.4 - 97.8 fL    MCH 31.0 27.0 - 33.0 pg    MCHC 33.2 32.2 - 35.5 g/dL    RDW 45.3 35.9 - 50.0 fL    Platelet Count 213 164 - 446 K/uL    MPV 11.3 9.0 - 12.9 fL    Neutrophils-Polys 47.20 44.00 - 72.00 %    Lymphocytes 40.90 22.00 - 41.00 %    Monocytes 8.70 0.00 - 13.40 %    Eosinophils 1.90 0.00 - 6.90 %    Basophils 1.00 0.00 - 1.80 %    Immature Granulocytes 0.30 0.00 - 0.90 %    Nucleated RBC 0.00 0.00 - 0.20 /100 WBC    Neutrophils (Absolute) 3.41 1.82 - 7.42 K/uL    Lymphs (Absolute) 2.96 1.00 - 4.80 K/uL    Monos (Absolute) 0.63 0.00 - 0.85 K/uL    Eos (Absolute) 0.14 0.00 - 0.51 K/uL    Baso (Absolute) 0.07 0.00 - 0.12 K/uL    Immature Granulocytes (abs) 0.02 0.00 - 0.11 K/uL    NRBC (Absolute) 0.00 K/uL   COMP METABOLIC PANEL   Result Value Ref Range    Sodium 138 135 - 145 mmol/L    Potassium 3.4 (L) 3.6 - 5.5 mmol/L    Chloride 103 96 - 112 mmol/L    Co2 20 20 - 33 mmol/L    Anion Gap 15.0 7.0 - 16.0    Glucose 82 65 - 99 mg/dL    Bun 15 8 - 22 mg/dL    Creatinine 0.77 0.50 - 1.40 mg/dL    Calcium 9.6 8.4 - 10.2 mg/dL    Correct Calcium 9.1 8.5 - 10.5 mg/dL    AST(SGOT) 17 12 - 45 U/L    ALT(SGPT) 17 2 - 50 U/L    Alkaline Phosphatase 53 30 - 99 U/L    Total Bilirubin 0.5 0.1 - 1.5 mg/dL    Albumin 4.6 3.2 - 4.9 g/dL    Total Protein 7.3 6.0 - 8.2 g/dL    Globulin 2.7 1.9 - 3.5 g/dL    A-G Ratio 1.7 g/dL   LIPASE   Result Value Ref Range    Lipase 37  11 - 82 U/L   URINALYSIS CULTURE, IF INDICATED    Specimen: Urine, Clean Catch   Result Value Ref Range    Color Yellow     Character Clear     Specific Gravity >=1.030 <1.035    Ph 5.5 5.0 - 8.0    Glucose Negative Negative mg/dL    Ketones Negative Negative mg/dL    Protein Negative Negative mg/dL    Bilirubin Negative Negative    Nitrite Positive (A) Negative    Leukocyte Esterase Negative Negative    Occult Blood Negative Negative    Micro Urine Req Microscopic    HCG QUAL SERUM   Result Value Ref Range    Beta-Hcg Qualitative Serum Negative Negative   ESTIMATED GFR   Result Value Ref Range    GFR (CKD-EPI) 108 >60 mL/min/1.73 m 2   URINE MICROSCOPIC (W/UA)   Result Value Ref Range    WBC 5-10 (A) /hpf    RBC Rare /hpf    Bacteria Moderate (A) None /hpf    Epithelial Cells Few Few /hpf       CT-ABDOMEN-PELVIS WITH   Final Result         1.  No acute intra-abdominal abnormality identified   2.  Hepatomegaly   3.  3.5 cm supraumbilical fat-containing hernia      US-RUQ   Final Result         1.  Hepatomegaly